# Patient Record
Sex: FEMALE | Race: WHITE | Employment: OTHER | URBAN - METROPOLITAN AREA
[De-identification: names, ages, dates, MRNs, and addresses within clinical notes are randomized per-mention and may not be internally consistent; named-entity substitution may affect disease eponyms.]

---

## 2017-01-16 RX ORDER — LISINOPRIL 5 MG/1
TABLET ORAL
Qty: 60 TABLET | Refills: 6 | Status: SHIPPED | OUTPATIENT
Start: 2017-01-16 | End: 2017-03-14 | Stop reason: SDUPTHER

## 2017-01-30 ENCOUNTER — OFFICE VISIT (OUTPATIENT)
Dept: INTERNAL MEDICINE CLINIC | Age: 67
End: 2017-01-30

## 2017-01-30 VITALS
RESPIRATION RATE: 16 BRPM | SYSTOLIC BLOOD PRESSURE: 110 MMHG | WEIGHT: 146 LBS | HEART RATE: 82 BPM | OXYGEN SATURATION: 97 % | HEIGHT: 62 IN | BODY MASS INDEX: 26.87 KG/M2 | DIASTOLIC BLOOD PRESSURE: 64 MMHG

## 2017-01-30 DIAGNOSIS — I10 ESSENTIAL HYPERTENSION: Chronic | ICD-10-CM

## 2017-01-30 DIAGNOSIS — G89.29 CHRONIC RIGHT SHOULDER PAIN: ICD-10-CM

## 2017-01-30 DIAGNOSIS — G25.81 RESTLESS LEGS SYNDROME (RLS): Chronic | ICD-10-CM

## 2017-01-30 DIAGNOSIS — M25.511 CHRONIC RIGHT SHOULDER PAIN: ICD-10-CM

## 2017-01-30 DIAGNOSIS — E78.2 MIXED HYPERLIPIDEMIA: Chronic | ICD-10-CM

## 2017-01-30 DIAGNOSIS — G89.4 CHRONIC PAIN SYNDROME: Primary | Chronic | ICD-10-CM

## 2017-01-30 DIAGNOSIS — M15.9 PRIMARY OSTEOARTHRITIS INVOLVING MULTIPLE JOINTS: Chronic | ICD-10-CM

## 2017-01-30 DIAGNOSIS — F41.9 ANXIETY: ICD-10-CM

## 2017-01-30 DIAGNOSIS — N39.46 MIXED INCONTINENCE: Chronic | ICD-10-CM

## 2017-01-30 PROCEDURE — 99214 OFFICE O/P EST MOD 30 MIN: CPT | Performed by: INTERNAL MEDICINE

## 2017-01-30 RX ORDER — HYDROCODONE BITARTRATE AND ACETAMINOPHEN 7.5; 325 MG/1; MG/1
TABLET ORAL
Qty: 90 TABLET | Refills: 0 | Status: SHIPPED | OUTPATIENT
Start: 2017-01-30 | End: 2017-03-30 | Stop reason: SDUPTHER

## 2017-02-13 RX ORDER — ATORVASTATIN CALCIUM 20 MG/1
TABLET, FILM COATED ORAL
Qty: 30 TABLET | Refills: 4 | Status: SHIPPED | OUTPATIENT
Start: 2017-02-13 | End: 2017-03-14 | Stop reason: SDUPTHER

## 2017-03-01 RX ORDER — HYDROCODONE BITARTRATE AND ACETAMINOPHEN 7.5; 325 MG/1; MG/1
TABLET ORAL
Qty: 90 TABLET | Refills: 0 | Status: SHIPPED | OUTPATIENT
Start: 2017-03-01 | End: 2017-03-08 | Stop reason: SDUPTHER

## 2017-03-08 ENCOUNTER — OFFICE VISIT (OUTPATIENT)
Dept: CARDIOLOGY CLINIC | Age: 67
End: 2017-03-08

## 2017-03-08 VITALS
DIASTOLIC BLOOD PRESSURE: 64 MMHG | SYSTOLIC BLOOD PRESSURE: 122 MMHG | HEIGHT: 62 IN | WEIGHT: 151 LBS | OXYGEN SATURATION: 98 % | BODY MASS INDEX: 27.79 KG/M2 | HEART RATE: 76 BPM

## 2017-03-08 DIAGNOSIS — I10 ESSENTIAL HYPERTENSION: Chronic | ICD-10-CM

## 2017-03-08 DIAGNOSIS — I25.10 CORONARY ARTERY DISEASE INVOLVING NATIVE CORONARY ARTERY OF NATIVE HEART WITHOUT ANGINA PECTORIS: ICD-10-CM

## 2017-03-08 DIAGNOSIS — E78.2 MIXED HYPERLIPIDEMIA: Chronic | ICD-10-CM

## 2017-03-08 DIAGNOSIS — I42.8 NONISCHEMIC CARDIOMYOPATHY (HCC): ICD-10-CM

## 2017-03-08 DIAGNOSIS — R06.02 SHORTNESS OF BREATH: Primary | ICD-10-CM

## 2017-03-08 PROCEDURE — 99214 OFFICE O/P EST MOD 30 MIN: CPT | Performed by: INTERNAL MEDICINE

## 2017-03-13 ENCOUNTER — OFFICE VISIT (OUTPATIENT)
Dept: ORTHOPEDIC SURGERY | Age: 67
End: 2017-03-13

## 2017-03-13 VITALS
BODY MASS INDEX: 27.79 KG/M2 | DIASTOLIC BLOOD PRESSURE: 66 MMHG | HEIGHT: 62 IN | RESPIRATION RATE: 16 BRPM | WEIGHT: 151 LBS | TEMPERATURE: 98.6 F | HEART RATE: 70 BPM | SYSTOLIC BLOOD PRESSURE: 127 MMHG

## 2017-03-13 DIAGNOSIS — G89.29 CHRONIC RIGHT SHOULDER PAIN: Primary | ICD-10-CM

## 2017-03-13 DIAGNOSIS — M77.8 TENDONITIS OF SHOULDER, LEFT: ICD-10-CM

## 2017-03-13 DIAGNOSIS — M25.511 CHRONIC RIGHT SHOULDER PAIN: Primary | ICD-10-CM

## 2017-03-13 DIAGNOSIS — M77.8 TENDONITIS OF SHOULDER, RIGHT: ICD-10-CM

## 2017-03-13 PROCEDURE — 99213 OFFICE O/P EST LOW 20 MIN: CPT | Performed by: PHYSICIAN ASSISTANT

## 2017-03-13 PROCEDURE — 20610 DRAIN/INJ JOINT/BURSA W/O US: CPT | Performed by: PHYSICIAN ASSISTANT

## 2017-03-13 PROCEDURE — 73030 X-RAY EXAM OF SHOULDER: CPT | Performed by: PHYSICIAN ASSISTANT

## 2017-03-13 RX ORDER — CARVEDILOL 6.25 MG/1
TABLET ORAL
Qty: 90 TABLET | Refills: 2 | Status: SHIPPED | OUTPATIENT
Start: 2017-03-13 | End: 2017-03-14 | Stop reason: SDUPTHER

## 2017-03-15 RX ORDER — CARVEDILOL 6.25 MG/1
TABLET ORAL
Qty: 270 TABLET | Refills: 3 | Status: SHIPPED | OUTPATIENT
Start: 2017-03-15 | End: 2018-03-08 | Stop reason: SDUPTHER

## 2017-03-15 RX ORDER — ATORVASTATIN CALCIUM 20 MG/1
TABLET, FILM COATED ORAL
Qty: 90 TABLET | Refills: 3 | Status: SHIPPED | OUTPATIENT
Start: 2017-03-15 | End: 2018-03-04 | Stop reason: SDUPTHER

## 2017-03-15 RX ORDER — LISINOPRIL 5 MG/1
TABLET ORAL
Qty: 180 TABLET | Refills: 3 | Status: SHIPPED | OUTPATIENT
Start: 2017-03-15 | End: 2018-03-04 | Stop reason: SDUPTHER

## 2017-03-15 RX ORDER — OXYBUTYNIN CHLORIDE 10 MG/1
TABLET, EXTENDED RELEASE ORAL
Qty: 90 TABLET | Refills: 3 | Status: SHIPPED | OUTPATIENT
Start: 2017-03-15 | End: 2018-03-04 | Stop reason: SDUPTHER

## 2017-03-31 RX ORDER — HYDROCODONE BITARTRATE AND ACETAMINOPHEN 7.5; 325 MG/1; MG/1
TABLET ORAL
Qty: 90 TABLET | Refills: 0 | Status: SHIPPED | OUTPATIENT
Start: 2017-03-31 | End: 2017-05-01 | Stop reason: SDUPTHER

## 2017-04-20 ENCOUNTER — TELEPHONE (OUTPATIENT)
Dept: ORTHOPEDIC SURGERY | Age: 67
End: 2017-04-20

## 2017-05-01 ENCOUNTER — OFFICE VISIT (OUTPATIENT)
Dept: INTERNAL MEDICINE CLINIC | Age: 67
End: 2017-05-01

## 2017-05-01 VITALS
DIASTOLIC BLOOD PRESSURE: 66 MMHG | HEIGHT: 62 IN | SYSTOLIC BLOOD PRESSURE: 110 MMHG | WEIGHT: 153 LBS | BODY MASS INDEX: 28.16 KG/M2 | RESPIRATION RATE: 16 BRPM

## 2017-05-01 DIAGNOSIS — N39.46 MIXED INCONTINENCE: Chronic | ICD-10-CM

## 2017-05-01 DIAGNOSIS — I10 ESSENTIAL HYPERTENSION: Chronic | ICD-10-CM

## 2017-05-01 DIAGNOSIS — I25.10 CORONARY ARTERY DISEASE INVOLVING NATIVE CORONARY ARTERY OF NATIVE HEART WITHOUT ANGINA PECTORIS: ICD-10-CM

## 2017-05-01 DIAGNOSIS — E78.2 MIXED HYPERLIPIDEMIA: Chronic | ICD-10-CM

## 2017-05-01 DIAGNOSIS — M15.9 PRIMARY OSTEOARTHRITIS INVOLVING MULTIPLE JOINTS: Chronic | ICD-10-CM

## 2017-05-01 DIAGNOSIS — G89.4 CHRONIC PAIN SYNDROME: Chronic | ICD-10-CM

## 2017-05-01 PROCEDURE — 99214 OFFICE O/P EST MOD 30 MIN: CPT | Performed by: INTERNAL MEDICINE

## 2017-05-01 RX ORDER — HYDROCODONE BITARTRATE AND ACETAMINOPHEN 7.5; 325 MG/1; MG/1
TABLET ORAL
Qty: 90 TABLET | Refills: 0 | Status: SHIPPED | OUTPATIENT
Start: 2017-05-01 | End: 2017-05-31 | Stop reason: SDUPTHER

## 2017-05-01 ASSESSMENT — PATIENT HEALTH QUESTIONNAIRE - PHQ9
SUM OF ALL RESPONSES TO PHQ QUESTIONS 1-9: 0
2. FEELING DOWN, DEPRESSED OR HOPELESS: 0
1. LITTLE INTEREST OR PLEASURE IN DOING THINGS: 0
SUM OF ALL RESPONSES TO PHQ9 QUESTIONS 1 & 2: 0

## 2017-05-31 RX ORDER — HYDROCODONE BITARTRATE AND ACETAMINOPHEN 7.5; 325 MG/1; MG/1
TABLET ORAL
Qty: 90 TABLET | Status: CANCELLED | OUTPATIENT
Start: 2017-05-31

## 2017-05-31 RX ORDER — HYDROCODONE BITARTRATE AND ACETAMINOPHEN 7.5; 325 MG/1; MG/1
TABLET ORAL
Qty: 90 TABLET | Refills: 0 | Status: SHIPPED | OUTPATIENT
Start: 2017-05-31 | End: 2017-06-29 | Stop reason: SDUPTHER

## 2017-06-09 ENCOUNTER — OFFICE VISIT (OUTPATIENT)
Dept: INTERNAL MEDICINE CLINIC | Age: 67
End: 2017-06-09

## 2017-06-09 ENCOUNTER — HOSPITAL ENCOUNTER (OUTPATIENT)
Dept: OTHER | Age: 67
Discharge: HOME | End: 2017-06-09
Attending: INTERNAL MEDICINE | Admitting: INTERNAL MEDICINE

## 2017-06-09 VITALS
HEIGHT: 62 IN | RESPIRATION RATE: 16 BRPM | BODY MASS INDEX: 28.16 KG/M2 | SYSTOLIC BLOOD PRESSURE: 116 MMHG | WEIGHT: 153 LBS | DIASTOLIC BLOOD PRESSURE: 72 MMHG

## 2017-06-09 DIAGNOSIS — R22.1 LUMP IN NECK: ICD-10-CM

## 2017-06-09 DIAGNOSIS — R22.1 LUMP IN NECK: Primary | ICD-10-CM

## 2017-06-09 DIAGNOSIS — R22.2 LUMP IN CHEST: ICD-10-CM

## 2017-06-09 PROCEDURE — 99212 OFFICE O/P EST SF 10 MIN: CPT | Performed by: INTERNAL MEDICINE

## 2017-06-29 ENCOUNTER — TELEPHONE (OUTPATIENT)
Dept: INTERNAL MEDICINE CLINIC | Age: 67
End: 2017-06-29

## 2017-06-30 RX ORDER — HYDROCODONE BITARTRATE AND ACETAMINOPHEN 7.5; 325 MG/1; MG/1
TABLET ORAL
Qty: 90 TABLET | Refills: 0 | Status: SHIPPED | OUTPATIENT
Start: 2017-06-30 | End: 2017-07-28 | Stop reason: SDUPTHER

## 2017-07-28 ENCOUNTER — OFFICE VISIT (OUTPATIENT)
Dept: INTERNAL MEDICINE CLINIC | Age: 67
End: 2017-07-28

## 2017-07-28 VITALS
BODY MASS INDEX: 29.08 KG/M2 | RESPIRATION RATE: 16 BRPM | DIASTOLIC BLOOD PRESSURE: 74 MMHG | HEIGHT: 62 IN | SYSTOLIC BLOOD PRESSURE: 110 MMHG | WEIGHT: 158 LBS

## 2017-07-28 DIAGNOSIS — G89.4 CHRONIC PAIN SYNDROME: Chronic | ICD-10-CM

## 2017-07-28 DIAGNOSIS — E78.2 MIXED HYPERLIPIDEMIA: Chronic | ICD-10-CM

## 2017-07-28 DIAGNOSIS — I10 ESSENTIAL HYPERTENSION: Chronic | ICD-10-CM

## 2017-07-28 DIAGNOSIS — I25.10 CORONARY ARTERY DISEASE INVOLVING NATIVE CORONARY ARTERY OF NATIVE HEART WITHOUT ANGINA PECTORIS: ICD-10-CM

## 2017-07-28 DIAGNOSIS — F41.9 ANXIETY: ICD-10-CM

## 2017-07-28 PROCEDURE — 99214 OFFICE O/P EST MOD 30 MIN: CPT | Performed by: INTERNAL MEDICINE

## 2017-07-28 RX ORDER — HYDROCODONE BITARTRATE AND ACETAMINOPHEN 7.5; 325 MG/1; MG/1
TABLET ORAL
Qty: 90 TABLET | Refills: 0 | Status: SHIPPED | OUTPATIENT
Start: 2017-07-28 | End: 2017-08-28 | Stop reason: SDUPTHER

## 2017-08-28 ENCOUNTER — TELEPHONE (OUTPATIENT)
Dept: INTERNAL MEDICINE CLINIC | Age: 67
End: 2017-08-28

## 2017-08-28 RX ORDER — HYDROCODONE BITARTRATE AND ACETAMINOPHEN 7.5; 325 MG/1; MG/1
TABLET ORAL
Qty: 90 TABLET | Refills: 0 | Status: SHIPPED | OUTPATIENT
Start: 2017-08-28 | End: 2017-09-27 | Stop reason: SDUPTHER

## 2017-09-11 ENCOUNTER — TELEPHONE (OUTPATIENT)
Dept: INTERNAL MEDICINE CLINIC | Age: 67
End: 2017-09-11

## 2017-09-11 DIAGNOSIS — I25.10 CORONARY ARTERY DISEASE INVOLVING NATIVE CORONARY ARTERY OF NATIVE HEART WITHOUT ANGINA PECTORIS: ICD-10-CM

## 2017-09-11 DIAGNOSIS — E78.2 MIXED HYPERLIPIDEMIA: Primary | Chronic | ICD-10-CM

## 2017-09-11 DIAGNOSIS — I10 ESSENTIAL HYPERTENSION: Chronic | ICD-10-CM

## 2017-09-21 DIAGNOSIS — R06.02 SHORTNESS OF BREATH: ICD-10-CM

## 2017-09-21 DIAGNOSIS — I25.10 CORONARY ARTERY DISEASE INVOLVING NATIVE CORONARY ARTERY OF NATIVE HEART WITHOUT ANGINA PECTORIS: ICD-10-CM

## 2017-09-21 DIAGNOSIS — I10 ESSENTIAL HYPERTENSION: Chronic | ICD-10-CM

## 2017-09-21 DIAGNOSIS — E78.2 MIXED HYPERLIPIDEMIA: Chronic | ICD-10-CM

## 2017-09-21 LAB
BASOPHILS ABSOLUTE: 0.1 K/UL (ref 0–0.2)
BASOPHILS RELATIVE PERCENT: 0.8 %
EOSINOPHILS ABSOLUTE: 0.2 K/UL (ref 0–0.6)
EOSINOPHILS RELATIVE PERCENT: 2.1 %
HCT VFR BLD CALC: 42.4 % (ref 36–48)
HEMOGLOBIN: 14.6 G/DL (ref 12–16)
LYMPHOCYTES ABSOLUTE: 2.8 K/UL (ref 1–5.1)
LYMPHOCYTES RELATIVE PERCENT: 35 %
MCH RBC QN AUTO: 30.9 PG (ref 26–34)
MCHC RBC AUTO-ENTMCNC: 34.4 G/DL (ref 31–36)
MCV RBC AUTO: 89.9 FL (ref 80–100)
MONOCYTES ABSOLUTE: 0.5 K/UL (ref 0–1.3)
MONOCYTES RELATIVE PERCENT: 6.2 %
NEUTROPHILS ABSOLUTE: 4.5 K/UL (ref 1.7–7.7)
NEUTROPHILS RELATIVE PERCENT: 55.9 %
PDW BLD-RTO: 12.6 % (ref 12.4–15.4)
PLATELET # BLD: 262 K/UL (ref 135–450)
PMV BLD AUTO: 7.4 FL (ref 5–10.5)
RBC # BLD: 4.72 M/UL (ref 4–5.2)
WBC # BLD: 8 K/UL (ref 4–11)

## 2017-09-22 ENCOUNTER — OFFICE VISIT (OUTPATIENT)
Dept: CARDIOLOGY CLINIC | Age: 67
End: 2017-09-22

## 2017-09-22 VITALS
OXYGEN SATURATION: 97 % | DIASTOLIC BLOOD PRESSURE: 60 MMHG | HEART RATE: 74 BPM | WEIGHT: 151 LBS | SYSTOLIC BLOOD PRESSURE: 110 MMHG | HEIGHT: 62 IN | BODY MASS INDEX: 27.79 KG/M2

## 2017-09-22 DIAGNOSIS — I42.8 NICM (NONISCHEMIC CARDIOMYOPATHY) (HCC): Primary | ICD-10-CM

## 2017-09-22 DIAGNOSIS — E78.2 MIXED HYPERLIPIDEMIA: Chronic | ICD-10-CM

## 2017-09-22 DIAGNOSIS — I10 ESSENTIAL HYPERTENSION: Chronic | ICD-10-CM

## 2017-09-22 DIAGNOSIS — I25.10 CORONARY ARTERY DISEASE INVOLVING NATIVE CORONARY ARTERY OF NATIVE HEART WITHOUT ANGINA PECTORIS: ICD-10-CM

## 2017-09-22 DIAGNOSIS — R06.02 SOB (SHORTNESS OF BREATH): ICD-10-CM

## 2017-09-22 LAB
A/G RATIO: 1.6 (ref 1.1–2.2)
ALBUMIN SERPL-MCNC: 4.2 G/DL (ref 3.4–5)
ALP BLD-CCNC: 126 U/L (ref 40–129)
ALT SERPL-CCNC: 12 U/L (ref 10–40)
ANION GAP SERPL CALCULATED.3IONS-SCNC: 12 MMOL/L (ref 3–16)
ANION GAP SERPL CALCULATED.3IONS-SCNC: 13 MMOL/L (ref 3–16)
AST SERPL-CCNC: 18 U/L (ref 15–37)
BILIRUB SERPL-MCNC: 0.5 MG/DL (ref 0–1)
BUN BLDV-MCNC: 11 MG/DL (ref 7–20)
BUN BLDV-MCNC: 12 MG/DL (ref 7–20)
CALCIUM SERPL-MCNC: 9.3 MG/DL (ref 8.3–10.6)
CALCIUM SERPL-MCNC: 9.4 MG/DL (ref 8.3–10.6)
CHLORIDE BLD-SCNC: 100 MMOL/L (ref 99–110)
CHLORIDE BLD-SCNC: 99 MMOL/L (ref 99–110)
CHOLESTEROL, TOTAL: 178 MG/DL (ref 0–199)
CO2: 28 MMOL/L (ref 21–32)
CO2: 28 MMOL/L (ref 21–32)
CREAT SERPL-MCNC: 0.7 MG/DL (ref 0.6–1.2)
CREAT SERPL-MCNC: 0.7 MG/DL (ref 0.6–1.2)
GFR AFRICAN AMERICAN: >60
GFR AFRICAN AMERICAN: >60
GFR NON-AFRICAN AMERICAN: >60
GFR NON-AFRICAN AMERICAN: >60
GLOBULIN: 2.7 G/DL
GLUCOSE BLD-MCNC: 105 MG/DL (ref 70–99)
GLUCOSE BLD-MCNC: 106 MG/DL (ref 70–99)
HDLC SERPL-MCNC: 58 MG/DL (ref 40–60)
LDL CHOLESTEROL CALCULATED: 97 MG/DL
POTASSIUM SERPL-SCNC: 4 MMOL/L (ref 3.5–5.1)
POTASSIUM SERPL-SCNC: 4.1 MMOL/L (ref 3.5–5.1)
PRO-BNP: 105 PG/ML (ref 0–124)
SODIUM BLD-SCNC: 140 MMOL/L (ref 136–145)
SODIUM BLD-SCNC: 140 MMOL/L (ref 136–145)
TOTAL PROTEIN: 6.9 G/DL (ref 6.4–8.2)
TRIGL SERPL-MCNC: 115 MG/DL (ref 0–150)
TSH SERPL DL<=0.05 MIU/L-ACNC: 1.52 UIU/ML (ref 0.27–4.2)
VLDLC SERPL CALC-MCNC: 23 MG/DL

## 2017-09-22 PROCEDURE — 99215 OFFICE O/P EST HI 40 MIN: CPT | Performed by: INTERNAL MEDICINE

## 2017-09-27 ENCOUNTER — TELEPHONE (OUTPATIENT)
Dept: INTERNAL MEDICINE CLINIC | Age: 67
End: 2017-09-27

## 2017-09-28 RX ORDER — HYDROCODONE BITARTRATE AND ACETAMINOPHEN 7.5; 325 MG/1; MG/1
TABLET ORAL
Qty: 90 TABLET | Refills: 0 | Status: SHIPPED | OUTPATIENT
Start: 2017-09-28 | End: 2017-11-01 | Stop reason: SDUPTHER

## 2017-10-30 ENCOUNTER — TELEPHONE (OUTPATIENT)
Dept: INTERNAL MEDICINE CLINIC | Age: 67
End: 2017-10-30

## 2017-10-30 NOTE — TELEPHONE ENCOUNTER
Patient was called regarding missing her appointments/cancelling after time of appointments. Patient states she wants a recommendation for a PCP closer to where she lives in [place].  Please reach patient on number listed

## 2017-11-01 ENCOUNTER — OFFICE VISIT (OUTPATIENT)
Dept: INTERNAL MEDICINE CLINIC | Age: 67
End: 2017-11-01

## 2017-11-01 VITALS
BODY MASS INDEX: 27.6 KG/M2 | RESPIRATION RATE: 16 BRPM | WEIGHT: 150 LBS | DIASTOLIC BLOOD PRESSURE: 74 MMHG | SYSTOLIC BLOOD PRESSURE: 108 MMHG | HEIGHT: 62 IN

## 2017-11-01 DIAGNOSIS — G25.81 RESTLESS LEGS SYNDROME (RLS): Chronic | ICD-10-CM

## 2017-11-01 DIAGNOSIS — G89.4 CHRONIC PAIN SYNDROME: Chronic | ICD-10-CM

## 2017-11-01 DIAGNOSIS — I10 ESSENTIAL HYPERTENSION: Chronic | ICD-10-CM

## 2017-11-01 DIAGNOSIS — E78.2 MIXED HYPERLIPIDEMIA: Chronic | ICD-10-CM

## 2017-11-01 PROCEDURE — 90670 PCV13 VACCINE IM: CPT | Performed by: INTERNAL MEDICINE

## 2017-11-01 PROCEDURE — G0009 ADMIN PNEUMOCOCCAL VACCINE: HCPCS | Performed by: INTERNAL MEDICINE

## 2017-11-01 PROCEDURE — 99214 OFFICE O/P EST MOD 30 MIN: CPT | Performed by: INTERNAL MEDICINE

## 2017-11-01 RX ORDER — AZITHROMYCIN 250 MG/1
TABLET, FILM COATED ORAL
Qty: 1 PACKET | Refills: 0 | Status: SHIPPED | OUTPATIENT
Start: 2017-11-01 | End: 2017-11-11

## 2017-11-01 RX ORDER — HYDROCODONE BITARTRATE AND ACETAMINOPHEN 7.5; 325 MG/1; MG/1
TABLET ORAL
Qty: 90 TABLET | Refills: 0 | Status: SHIPPED | OUTPATIENT
Start: 2017-11-01 | End: 2017-11-30 | Stop reason: SDUPTHER

## 2017-11-01 ASSESSMENT — PATIENT HEALTH QUESTIONNAIRE - PHQ9
1. LITTLE INTEREST OR PLEASURE IN DOING THINGS: 0
SUM OF ALL RESPONSES TO PHQ QUESTIONS 1-9: 0
SUM OF ALL RESPONSES TO PHQ9 QUESTIONS 1 & 2: 0
2. FEELING DOWN, DEPRESSED OR HOPELESS: 0

## 2017-11-01 NOTE — ASSESSMENT & PLAN NOTE
This problem is stable, reviewed in detail, and advised patient to continue the current instructions or medications. Will continue to closely monitor the situation.

## 2017-11-01 NOTE — PROGRESS NOTES
Subjective:      Patient ID: Macy Bull is a 74 y.o. female. HPI  Established patient here for a visit to manage chronic medical conditions as detailed below. Chronic pain syndrome  I counseled the patient for 10 minutes, on the importance of avoiding and trying to wean slowly off the pain medications. I explained to the patient the downside of continuing on the pain medications/benzodiazepines and their addictive and physical dependance properties. Patient expresses understanding. Controlled Substances Monitoring:     Attestation: The Prescription Monitoring Report for this patient was reviewed today. Randall Marion MD)  Documentation: Possible medication side effects, risk of tolerance and/or dependence, and alternative treatments discussed., No signs of potential drug abuse or diversion identified., Existing medication contract. Randall Marion MD)  Chronic Pain: Treatment objectives documented - patient is progressing appropriately. , Dose reduction has been attempted., Reviewed the patient's functional status and documentation, including the 4A's of chronic pain treatment. Randall Marion MD)     Essential hypertension  This is a chronic problem. The problem is well controlled. Patient monitors readings regularly. Pertinent negatives include no chest pain, focal sensory loss, focal weakness, leg pain, myalgias or shortness of breath. No headaches or chest pain. Takes medications regularly. Blood pressure has been stable, blood work was reviewed, and advised patient to continue the current instructions or medications. Mixed hyperlipidemia  This has been a chronic problem, takes meds regularly. Monitors diet and tries to follow a low fat diet. Not been very compliant w exercise. Lipids have been stable, The problem is controlled.  Recent lipid tests were reviewed and are normal. Pertinent negatives include no chest pain, focal sensory loss, focal weakness, leg pain, myalgias or Extremities are normal. Peripheral pulses are normal. Screening neurological exam is normal without focal findings. Cranial nerves are intact, reflexes are symmetrical and muscle strength eaqual. Skin is normal without suspicious lesions noted. Assessment:      Chronic pain syndrome  I counseled the patient for 10 minutes, on the importance of avoiding and trying to wean slowly off the pain medications. I explained to the patient the downside of continuing on the pain medications/benzodiazepines and their addictive and physical dependance properties. Patient expresses understanding. Controlled Substances Monitoring:     Attestation: The Prescription Monitoring Report for this patient was reviewed today. Nadeen Zhao MD)  Documentation: Possible medication side effects, risk of tolerance and/or dependence, and alternative treatments discussed., No signs of potential drug abuse or diversion identified., Existing medication contract. Nadeen Zhao MD)  Chronic Pain: Treatment objectives documented - patient is progressing appropriately. , Dose reduction has been attempted., Reviewed the patient's functional status and documentation, including the 4A's of chronic pain treatment. Nadeen Zhao MD)     Essential hypertension  This is a chronic problem. The problem is well controlled. Patient monitors readings regularly. Pertinent negatives include no chest pain, focal sensory loss, focal weakness, leg pain, myalgias or shortness of breath. No headaches or chest pain. Takes medications regularly. Blood pressure has been stable, blood work was reviewed, and advised patient to continue the current instructions or medications. Mixed hyperlipidemia  This has been a chronic problem, takes meds regularly. Monitors diet and tries to follow a low fat diet. Not been very compliant w exercise. Lipids have been stable, The problem is controlled.  Recent lipid tests were reviewed and are normal. Pertinent

## 2017-11-21 ENCOUNTER — TELEPHONE (OUTPATIENT)
Dept: INTERNAL MEDICINE CLINIC | Age: 67
End: 2017-11-21

## 2017-11-21 NOTE — TELEPHONE ENCOUNTER
Pt wants to know if Dr. Isaiah Durán would give her a script for Diclofenac Sodium EC 50 mg tabs for arthritis pain. She took it a couple of yrs ago but Dr. Simeon Rey took her off of it because of heart failure. Pt said Dr. Simeon Rey said she can take it every once  In a while. Call pt and let her know.      [facility]

## 2017-11-22 RX ORDER — DICLOFENAC SODIUM 75 MG/1
75 TABLET, DELAYED RELEASE ORAL 2 TIMES DAILY
Qty: 30 TABLET | Refills: 0 | Status: SHIPPED | OUTPATIENT
Start: 2017-11-22 | End: 2018-03-04 | Stop reason: SDUPTHER

## 2017-11-30 ENCOUNTER — TELEPHONE (OUTPATIENT)
Dept: INTERNAL MEDICINE CLINIC | Age: 67
End: 2017-11-30

## 2017-11-30 RX ORDER — HYDROCODONE BITARTRATE AND ACETAMINOPHEN 7.5; 325 MG/1; MG/1
TABLET ORAL
Qty: 90 TABLET | Refills: 0 | Status: SHIPPED | OUTPATIENT
Start: 2017-11-30 | End: 2017-12-29 | Stop reason: SDUPTHER

## 2017-12-28 ENCOUNTER — TELEPHONE (OUTPATIENT)
Dept: INTERNAL MEDICINE CLINIC | Age: 67
End: 2017-12-28

## 2017-12-28 RX ORDER — HYDROCODONE BITARTRATE AND ACETAMINOPHEN 7.5; 325 MG/1; MG/1
TABLET ORAL
Qty: 90 TABLET | Status: CANCELLED | OUTPATIENT
Start: 2017-12-28

## 2017-12-29 DIAGNOSIS — M15.9 PRIMARY OSTEOARTHRITIS INVOLVING MULTIPLE JOINTS: Primary | ICD-10-CM

## 2017-12-29 RX ORDER — HYDROCODONE BITARTRATE AND ACETAMINOPHEN 7.5; 325 MG/1; MG/1
TABLET ORAL
Qty: 90 TABLET | Refills: 0 | Status: SHIPPED | OUTPATIENT
Start: 2017-12-29 | End: 2018-01-30 | Stop reason: SDUPTHER

## 2018-01-26 ENCOUNTER — TELEPHONE (OUTPATIENT)
Dept: INTERNAL MEDICINE CLINIC | Age: 68
End: 2018-01-26

## 2018-01-26 DIAGNOSIS — M15.9 PRIMARY OSTEOARTHRITIS INVOLVING MULTIPLE JOINTS: ICD-10-CM

## 2018-01-29 RX ORDER — HYDROCODONE BITARTRATE AND ACETAMINOPHEN 7.5; 325 MG/1; MG/1
TABLET ORAL
Qty: 90 TABLET | OUTPATIENT
Start: 2018-01-29 | End: 2018-02-26

## 2018-01-30 ENCOUNTER — OFFICE VISIT (OUTPATIENT)
Dept: INTERNAL MEDICINE CLINIC | Age: 68
End: 2018-01-30

## 2018-01-30 VITALS
HEIGHT: 62 IN | RESPIRATION RATE: 16 BRPM | BODY MASS INDEX: 27.6 KG/M2 | DIASTOLIC BLOOD PRESSURE: 76 MMHG | SYSTOLIC BLOOD PRESSURE: 120 MMHG | WEIGHT: 150 LBS

## 2018-01-30 DIAGNOSIS — I25.10 CORONARY ARTERY DISEASE INVOLVING NATIVE CORONARY ARTERY OF NATIVE HEART WITHOUT ANGINA PECTORIS: ICD-10-CM

## 2018-01-30 DIAGNOSIS — E78.2 MIXED HYPERLIPIDEMIA: Chronic | ICD-10-CM

## 2018-01-30 DIAGNOSIS — Z12.39 SCREENING FOR BREAST CANCER: ICD-10-CM

## 2018-01-30 DIAGNOSIS — G89.4 CHRONIC PAIN SYNDROME: Primary | Chronic | ICD-10-CM

## 2018-01-30 DIAGNOSIS — M15.9 PRIMARY OSTEOARTHRITIS INVOLVING MULTIPLE JOINTS: Chronic | ICD-10-CM

## 2018-01-30 DIAGNOSIS — I10 ESSENTIAL HYPERTENSION: Chronic | ICD-10-CM

## 2018-01-30 PROCEDURE — 99214 OFFICE O/P EST MOD 30 MIN: CPT | Performed by: INTERNAL MEDICINE

## 2018-01-30 RX ORDER — HYDROCODONE BITARTRATE AND ACETAMINOPHEN 7.5; 325 MG/1; MG/1
TABLET ORAL
Qty: 90 TABLET | Refills: 0 | Status: SHIPPED | OUTPATIENT
Start: 2018-01-30 | End: 2018-02-28 | Stop reason: SDUPTHER

## 2018-01-30 NOTE — PROGRESS NOTES
Subjective:      Patient ID: Jen Harris is a 74 y.o. female. HPI  Established patient here for a visit to manage chronic medical conditions as detailed below. Chronic pain syndrome  I counseled the patient for 10 minutes, on the importance of avoiding and trying to wean slowly off the pain medications. I explained to the patient the downside of continuing on the pain medications/benzodiazepines and their addictive and physical dependance properties. Patient expresses understanding. Controlled Substances Monitoring:     Attestation: The Prescription Monitoring Report for this patient was reviewed today. Inge Chauhan MD)  Documentation: Possible medication side effects, risk of tolerance and/or dependence, and alternative treatments discussed., No signs of potential drug abuse or diversion identified. Inge Chauhan MD)  Acute Pain Prescriptions: Severe pain not adequately treated with lower dose. Inge Chauhan MD)  Chronic Pain: Dose reduction has been attempted., Reviewed the patient's functional status and documentation, including the 4A's of chronic pain treatment. , Reestablished informed consent, including providing the patient with written information on the potential adverse effects of long-term opioid therapy. , Functional status reviewed - continues with improved or maintaining ADL's., Treatment objectives documented - patient is progressing appropriately. Inge Chauhan MD)  Medication Contracts: Existing medication contract. Inge Chauhan MD)     Essential hypertension  This is a chronic problem. The problem is well controlled. Patient monitors readings regularly. Pertinent negatives include no chest pain, focal sensory loss, focal weakness, leg pain, myalgias or shortness of breath. No headaches or chest pain. Takes medications regularly. Blood pressure has been stable, blood work was reviewed, and advised patient to continue the current instructions or medications. Mixed hyperlipidemia  This has been a chronic problem, takes meds regularly. Monitors diet and tries to follow a low fat diet. Not been very compliant w exercise. Lipids have been stable, The problem is controlled. Recent lipid tests were reviewed and are normal. Pertinent negatives include no chest pain, focal sensory loss, focal weakness, leg pain, myalgias or shortness of breath. Advised patient to continue the current instructions or medications. Coronary artery disease involving native coronary artery of native heart without angina pectoris  Has been stable, taking medications regularly, no new side effects. Under good control. Primary osteoarthritis involving multiple joints  This problem is stable, reviewed in detail, and advised patient to continue the current instructions or medications. Will continue to closely monitor the situation. Review of Systems  ROS: No unusual headaches or allergy symptoms or blurred vision. No prolonged cough. No flushing or facial pain or chest pain,dizziness, dyspnea, palpitations, or chest pain on exertion. No syncope. No nausea or vommitting or diarrhea. No jaundice or abdominal pain, change in bowel habits, black or bloody stools. No dysuria or hematuria or frequency of urination. No myalgias or muscle pain. No numbness, weakness, or tingling. No falls, or loss of consciousness. No weight loss or back pain. No falls. No paresthesias. No joint swelling or redness. No joint pain. No recent weight loss. No focal weakness or sensory deficits or paresthesias, No confusion or altered sensorium. No hematemesis. No hearing loss. No siezures. All other systems were reviewed, and review was negative.    Objective:   Physical Exam  /76 (Site: Left Arm, Position: Sitting, Cuff Size: Medium Adult)   Resp 16   Ht 5' 2\" (1.575 m)   Wt 150 lb (68 kg)   BMI 27.44 kg/m²    The physical exam reveals a patient who appears well, alert and oriented x 3, pleasant, cooperative. Vitals are as noted. Head is atraumatic and normocephalic. Eyes reveal normal conjunctiva, cornea normal, pupils are equal and rective to light. Nasal mucosa is normal. Throat is normal without exudates. Ears reveal normal tympanic membranes. Neck is supple and free of adenopathy, or masses. No thyromegaly. No jugular venous distension. Lungs are clear to auscultation, no rales or rhonchi noted. Heart sounds are regular , no murmurs, clicks, gallops or rubs. Abdomen is soft, no tenderness, masses or organomegaly. Bowel sounds are normally heard. Pelvis: normal. Extremities are normal. Peripheral pulses are normal. Screening neurological exam is normal without focal findings. Cranial nerves are intact, reflexes are symmetrical and muscle strength eaqual. Skin is normal without suspicious lesions noted. Assessment:      Chronic pain syndrome  I counseled the patient for 10 minutes, on the importance of avoiding and trying to wean slowly off the pain medications. I explained to the patient the downside of continuing on the pain medications/benzodiazepines and their addictive and physical dependance properties. Patient expresses understanding. Controlled Substances Monitoring:     Attestation: The Prescription Monitoring Report for this patient was reviewed today. Gideon Crowell MD)  Documentation: Possible medication side effects, risk of tolerance and/or dependence, and alternative treatments discussed., No signs of potential drug abuse or diversion identified. Gideon Crowell MD)  Acute Pain Prescriptions: Severe pain not adequately treated with lower dose. Gideon Crowell MD)  Chronic Pain: Dose reduction has been attempted., Reviewed the patient's functional status and documentation, including the 4A's of chronic pain treatment. , Reestablished informed consent, including providing the patient with written information on the potential adverse effects of long-term opioid

## 2018-02-28 ENCOUNTER — TELEPHONE (OUTPATIENT)
Dept: INTERNAL MEDICINE CLINIC | Age: 68
End: 2018-02-28

## 2018-02-28 DIAGNOSIS — G89.4 CHRONIC PAIN SYNDROME: Primary | Chronic | ICD-10-CM

## 2018-02-28 DIAGNOSIS — M15.9 PRIMARY OSTEOARTHRITIS INVOLVING MULTIPLE JOINTS: Chronic | ICD-10-CM

## 2018-03-01 RX ORDER — HYDROCODONE BITARTRATE AND ACETAMINOPHEN 7.5; 325 MG/1; MG/1
TABLET ORAL
Qty: 90 TABLET | Refills: 0 | Status: SHIPPED | OUTPATIENT
Start: 2018-03-01 | End: 2018-04-03 | Stop reason: SDUPTHER

## 2018-03-05 RX ORDER — DICLOFENAC SODIUM 75 MG/1
TABLET, DELAYED RELEASE ORAL
Qty: 30 TABLET | Refills: 3 | Status: SHIPPED | OUTPATIENT
Start: 2018-03-05 | End: 2018-10-10 | Stop reason: SDUPTHER

## 2018-03-05 RX ORDER — ATORVASTATIN CALCIUM 20 MG/1
TABLET, FILM COATED ORAL
Qty: 90 TABLET | Refills: 3 | Status: SHIPPED | OUTPATIENT
Start: 2018-03-05 | End: 2019-03-03 | Stop reason: SDUPTHER

## 2018-03-05 RX ORDER — OXYBUTYNIN CHLORIDE 10 MG/1
TABLET, EXTENDED RELEASE ORAL
Qty: 90 TABLET | Refills: 3 | Status: SHIPPED | OUTPATIENT
Start: 2018-03-05 | End: 2019-03-03 | Stop reason: SDUPTHER

## 2018-03-06 RX ORDER — LISINOPRIL 5 MG/1
TABLET ORAL
Qty: 180 TABLET | Refills: 0 | Status: SHIPPED | OUTPATIENT
Start: 2018-03-06 | End: 2019-03-04 | Stop reason: SDUPTHER

## 2018-03-09 RX ORDER — LISINOPRIL 5 MG/1
TABLET ORAL
Qty: 180 TABLET | Refills: 2 | Status: SHIPPED | OUTPATIENT
Start: 2018-03-09 | End: 2018-09-04 | Stop reason: SDUPTHER

## 2018-03-09 RX ORDER — CARVEDILOL 6.25 MG/1
TABLET ORAL
Qty: 270 TABLET | Refills: 2 | Status: SHIPPED | OUTPATIENT
Start: 2018-03-09 | End: 2018-12-03 | Stop reason: SDUPTHER

## 2018-04-03 ENCOUNTER — TELEPHONE (OUTPATIENT)
Dept: INTERNAL MEDICINE CLINIC | Age: 68
End: 2018-04-03

## 2018-04-03 DIAGNOSIS — G89.4 CHRONIC PAIN SYNDROME: Chronic | ICD-10-CM

## 2018-04-03 DIAGNOSIS — M15.9 PRIMARY OSTEOARTHRITIS INVOLVING MULTIPLE JOINTS: Chronic | ICD-10-CM

## 2018-04-03 RX ORDER — HYDROCODONE BITARTRATE AND ACETAMINOPHEN 7.5; 325 MG/1; MG/1
TABLET ORAL
Qty: 90 TABLET | Refills: 0 | Status: SHIPPED | OUTPATIENT
Start: 2018-04-03 | End: 2018-05-01 | Stop reason: SDUPTHER

## 2018-04-03 RX ORDER — HYDROCODONE BITARTRATE AND ACETAMINOPHEN 5; 325 MG/1; MG/1
1 TABLET ORAL EVERY 6 HOURS PRN
Status: CANCELLED | OUTPATIENT
Start: 2018-04-03 | End: 2018-04-10

## 2018-05-01 ENCOUNTER — OFFICE VISIT (OUTPATIENT)
Dept: INTERNAL MEDICINE CLINIC | Age: 68
End: 2018-05-01

## 2018-05-01 VITALS
WEIGHT: 146 LBS | BODY MASS INDEX: 26.7 KG/M2 | OXYGEN SATURATION: 97 % | DIASTOLIC BLOOD PRESSURE: 66 MMHG | SYSTOLIC BLOOD PRESSURE: 108 MMHG | HEART RATE: 88 BPM

## 2018-05-01 DIAGNOSIS — I42.9 CARDIOMYOPATHY, UNSPECIFIED TYPE (HCC): ICD-10-CM

## 2018-05-01 DIAGNOSIS — I25.10 CORONARY ARTERY DISEASE INVOLVING NATIVE CORONARY ARTERY OF NATIVE HEART WITHOUT ANGINA PECTORIS: ICD-10-CM

## 2018-05-01 DIAGNOSIS — I10 ESSENTIAL HYPERTENSION: Chronic | ICD-10-CM

## 2018-05-01 DIAGNOSIS — E78.2 MIXED HYPERLIPIDEMIA: Chronic | ICD-10-CM

## 2018-05-01 DIAGNOSIS — M15.9 PRIMARY OSTEOARTHRITIS INVOLVING MULTIPLE JOINTS: Chronic | ICD-10-CM

## 2018-05-01 DIAGNOSIS — G89.4 CHRONIC PAIN SYNDROME: Chronic | ICD-10-CM

## 2018-05-01 PROCEDURE — 99214 OFFICE O/P EST MOD 30 MIN: CPT | Performed by: INTERNAL MEDICINE

## 2018-05-01 RX ORDER — HYDROCODONE BITARTRATE AND ACETAMINOPHEN 7.5; 325 MG/1; MG/1
TABLET ORAL
Qty: 90 TABLET | Refills: 0 | Status: SHIPPED | OUTPATIENT
Start: 2018-05-01 | End: 2018-06-01 | Stop reason: SDUPTHER

## 2018-05-31 ENCOUNTER — TELEPHONE (OUTPATIENT)
Dept: INTERNAL MEDICINE CLINIC | Age: 68
End: 2018-05-31

## 2018-05-31 DIAGNOSIS — G89.4 CHRONIC PAIN SYNDROME: Chronic | ICD-10-CM

## 2018-05-31 DIAGNOSIS — M15.9 PRIMARY OSTEOARTHRITIS INVOLVING MULTIPLE JOINTS: Chronic | ICD-10-CM

## 2018-06-01 ENCOUNTER — HOSPITAL ENCOUNTER (OUTPATIENT)
Dept: NON INVASIVE DIAGNOSTICS | Age: 68
Discharge: HOME | End: 2018-06-01
Attending: INTERNAL MEDICINE | Admitting: INTERNAL MEDICINE

## 2018-06-01 DIAGNOSIS — I42.8 OTHER CARDIOMYOPATHIES (CODE): ICD-10-CM

## 2018-06-01 DIAGNOSIS — I42.8 NICM (NONISCHEMIC CARDIOMYOPATHY) (HCC): ICD-10-CM

## 2018-06-01 LAB
LV EF: 53 %
LVEF MODALITY: NORMAL

## 2018-06-01 RX ORDER — HYDROCODONE BITARTRATE AND ACETAMINOPHEN 7.5; 325 MG/1; MG/1
TABLET ORAL
Qty: 90 TABLET | Refills: 0 | Status: SHIPPED | OUTPATIENT
Start: 2018-06-01 | End: 2018-07-02 | Stop reason: SDUPTHER

## 2018-06-08 ENCOUNTER — TELEPHONE (OUTPATIENT)
Dept: INTERNAL MEDICINE CLINIC | Age: 68
End: 2018-06-08

## 2018-06-08 ENCOUNTER — TELEPHONE (OUTPATIENT)
Dept: CARDIOLOGY CLINIC | Age: 68
End: 2018-06-08

## 2018-06-30 DIAGNOSIS — M15.9 PRIMARY OSTEOARTHRITIS INVOLVING MULTIPLE JOINTS: Chronic | ICD-10-CM

## 2018-06-30 DIAGNOSIS — G89.4 CHRONIC PAIN SYNDROME: Chronic | ICD-10-CM

## 2018-06-30 RX ORDER — HYDROCODONE BITARTRATE AND ACETAMINOPHEN 7.5; 325 MG/1; MG/1
TABLET ORAL
Qty: 90 TABLET | Refills: 0 | Status: CANCELLED | OUTPATIENT
Start: 2018-06-30 | End: 2018-07-30

## 2018-07-02 ENCOUNTER — TELEPHONE (OUTPATIENT)
Dept: INTERNAL MEDICINE CLINIC | Age: 68
End: 2018-07-02

## 2018-07-02 DIAGNOSIS — G89.4 CHRONIC PAIN SYNDROME: Chronic | ICD-10-CM

## 2018-07-02 DIAGNOSIS — M15.9 PRIMARY OSTEOARTHRITIS INVOLVING MULTIPLE JOINTS: Chronic | ICD-10-CM

## 2018-07-03 RX ORDER — HYDROCODONE BITARTRATE AND ACETAMINOPHEN 7.5; 325 MG/1; MG/1
TABLET ORAL
Qty: 90 TABLET | Refills: 0 | Status: SHIPPED | OUTPATIENT
Start: 2018-07-03 | End: 2018-08-02 | Stop reason: SDUPTHER

## 2018-08-02 ENCOUNTER — TELEPHONE (OUTPATIENT)
Dept: INTERNAL MEDICINE CLINIC | Age: 68
End: 2018-08-02

## 2018-08-02 DIAGNOSIS — M15.9 PRIMARY OSTEOARTHRITIS INVOLVING MULTIPLE JOINTS: Chronic | ICD-10-CM

## 2018-08-02 DIAGNOSIS — G89.4 CHRONIC PAIN SYNDROME: Chronic | ICD-10-CM

## 2018-08-02 RX ORDER — HYDROCODONE BITARTRATE AND ACETAMINOPHEN 7.5; 325 MG/1; MG/1
TABLET ORAL
Qty: 30 TABLET | Refills: 0 | Status: SHIPPED | OUTPATIENT
Start: 2018-08-02 | End: 2018-08-31

## 2018-08-02 NOTE — TELEPHONE ENCOUNTER
Pt called for an Rx for Hydrocodone-Apap 7.5-325 mg, #90, take 1 tab po q 8 hrs prn pain. [facility]       Call pt when this is done. /

## 2018-08-09 ENCOUNTER — OFFICE VISIT (OUTPATIENT)
Dept: INTERNAL MEDICINE CLINIC | Age: 68
End: 2018-08-09

## 2018-08-09 VITALS
DIASTOLIC BLOOD PRESSURE: 60 MMHG | HEIGHT: 62 IN | WEIGHT: 150 LBS | SYSTOLIC BLOOD PRESSURE: 110 MMHG | RESPIRATION RATE: 16 BRPM | OXYGEN SATURATION: 97 % | BODY MASS INDEX: 27.6 KG/M2 | HEART RATE: 103 BPM

## 2018-08-09 DIAGNOSIS — G25.81 RESTLESS LEGS SYNDROME (RLS): Chronic | ICD-10-CM

## 2018-08-09 DIAGNOSIS — Z12.11 SCREENING FOR COLON CANCER: ICD-10-CM

## 2018-08-09 DIAGNOSIS — F41.9 ANXIETY: ICD-10-CM

## 2018-08-09 DIAGNOSIS — Z12.39 SCREENING FOR BREAST CANCER: ICD-10-CM

## 2018-08-09 DIAGNOSIS — G89.4 CHRONIC PAIN SYNDROME: Primary | Chronic | ICD-10-CM

## 2018-08-09 DIAGNOSIS — I25.10 CORONARY ARTERY DISEASE INVOLVING NATIVE CORONARY ARTERY OF NATIVE HEART WITHOUT ANGINA PECTORIS: ICD-10-CM

## 2018-08-09 DIAGNOSIS — M15.9 PRIMARY OSTEOARTHRITIS INVOLVING MULTIPLE JOINTS: Chronic | ICD-10-CM

## 2018-08-09 DIAGNOSIS — E78.2 MIXED HYPERLIPIDEMIA: Chronic | ICD-10-CM

## 2018-08-09 DIAGNOSIS — I10 ESSENTIAL HYPERTENSION: Chronic | ICD-10-CM

## 2018-08-09 PROCEDURE — 99214 OFFICE O/P EST MOD 30 MIN: CPT | Performed by: INTERNAL MEDICINE

## 2018-08-09 RX ORDER — HYDROCODONE BITARTRATE AND ACETAMINOPHEN 7.5; 325 MG/1; MG/1
TABLET ORAL
Qty: 90 TABLET | Refills: 0 | Status: SHIPPED | OUTPATIENT
Start: 2018-08-09 | End: 2018-09-07

## 2018-08-09 NOTE — LETTER
reduced coughing, which are especially dangerous for patients with lung disease. Overdose or dangerous interactions with alcohol and other medications may occur, leading to death. Hyperalgesia may develop, in which patients receiving opioids for the treatment of pain may actually become more sensitive to certain painful stimuli, and in some cases, experience pain from ordinarily non-painful stimuli. Women between the ages of 14-53 who could become pregnant should carefully weigh the risks and benefits of opioids with their physicians, as these medications increase the risk of pregnancy complications, including miscarriage,  delivery and stillbirth. It is also possible for babies to be born addicted to opioids. Opioid dependence withdrawal symptoms may include; feelings of uneasiness, increased pain, irritability, belly pain, diarrhea, sweats and goose-flesh. Benzodiazepines and non-benzodiazepine sleep medications: These medications can lead to problems such as addiction/dependence, sedation, fatigue, lightheadedness, dizziness, incoordination, falls, depression, hallucinations, and impaired judgment, memory and concentration. The ability to drive and operate machinery may also be affected. Abnormal sleep-related behaviors have been reported, including sleep walking, driving, making telephone calls, eating, or having sex while not fully awake. These medications can suppress breathing and worsen sleep apnea, particularly when combined with alcohol or other sedating medications, potentially leading to death. Dependence withdrawal symptoms may include tremors, anxiety, hallucinations and seizures. Stimulants:  Common adverse effects include addiction/dependence, increased blood pressure and heart rate, decreased appetite, nausea, involuntary weight loss, insomnia, irritability, and headaches.   These risks may increase when these medications are combined with other stimulants, such as caffeine pills or energy drinks, certain weight loss supplements and oral decongestants. Dependence withdrawal symptoms may include depressed mood, loss of interest, suicidal thoughts, anxiety, fatigue, appetite changes and agitation. Testosterone replacement therapy:  Potential side effects include increased risk of stroke and heart attack, blood clots, increased blood pressure, increased cholesterol, enlarged prostate, sleep apnea, irritability/aggression and other mood disorders, and decreased fertility. Other:     1. I understand that I have the following responsibilities:  · I will take medications at the dose and frequency prescribed. · I will not increase or change how I take my medications without the approval of the health care provider who signs this Medication Agreement. · I will arrange for refills at the prescribed interval ONLY during regular office hours. I will not ask for refills earlier than agreed, after-hours, on holidays or on weekends. · I will obtain all refills for these medications at  ·  ____________________________________  pharmacy (phone number  ·  ________________________), with full consent for my provider and pharmacist to exchange information in writing or verbally. · I will not request any pain medications or controlled substances from other providers and will inform this provider of all other medications I am taking. · I will inform my other health care providers that I am taking these medications and of the existence of this [facility]. In the event of an emergency, I will provide the same information to the emergency department providers. · I will protect my prescriptions and medications. I understand that lost or misplaced prescriptions will not be replaced. · I will keep medications only for my own use and will not share them with others. I will keep all medications away from children. · My behavior is inconsistent with the responsibilities outlined above, which may also result in my being prevented from receiving further care from this office. · Other:____________________________________________________________________    AGREEMENT:    I have read the above and have had all of my questions answered. For chronic disease management, I know that my symptoms can be managed with many types of treatments. A chronic medication trial may be part of my treatment, but I must be an active participant in my care. Medication therapy is only one part of my symptom management plan. In some cases, there may be limited scientific evidence to support the chronic use of certain medications to improve symptoms and daily function. Furthermore, in certain circumstances, there may be scientific information that suggests that use of chronic controlled substances may actually worsen my symptoms and increase my risk of unintentional death directly related to this medication therapy. I know that if my provider feels my risk from controlled medications is greater than my benefit, I will have my controlled substance medication(s) compassionately lowered or removed altogether. I agree to a controlled substance medication trial.      I further agree to allow this office to contact family or friends if there are concerns about my safety and use of the controlled medications. I have agreed to use the following medications above as instructed by my physician and as stated in this [facility].      Patient Signature:  ______________________  Date:8/9/2018 or _____________    Provider Signature:______________________  Date:8/9/2018 or _____________

## 2018-09-04 ENCOUNTER — OFFICE VISIT (OUTPATIENT)
Dept: CARDIOLOGY CLINIC | Age: 68
End: 2018-09-04

## 2018-09-04 VITALS
WEIGHT: 148 LBS | HEART RATE: 70 BPM | DIASTOLIC BLOOD PRESSURE: 64 MMHG | SYSTOLIC BLOOD PRESSURE: 114 MMHG | BODY MASS INDEX: 27.23 KG/M2 | HEIGHT: 62 IN | OXYGEN SATURATION: 98 %

## 2018-09-04 DIAGNOSIS — I10 ESSENTIAL HYPERTENSION: ICD-10-CM

## 2018-09-04 DIAGNOSIS — E78.2 MIXED HYPERLIPIDEMIA: ICD-10-CM

## 2018-09-04 DIAGNOSIS — I25.10 CORONARY ARTERY DISEASE INVOLVING NATIVE CORONARY ARTERY OF NATIVE HEART WITHOUT ANGINA PECTORIS: Primary | ICD-10-CM

## 2018-09-04 DIAGNOSIS — I42.8 NICM (NONISCHEMIC CARDIOMYOPATHY) (HCC): ICD-10-CM

## 2018-09-04 PROCEDURE — 99214 OFFICE O/P EST MOD 30 MIN: CPT | Performed by: INTERNAL MEDICINE

## 2018-09-04 PROCEDURE — 93000 ELECTROCARDIOGRAM COMPLETE: CPT | Performed by: INTERNAL MEDICINE

## 2018-09-04 NOTE — PROGRESS NOTES
Pulse 70   Ht 5' 2\" (1.575 m)   Wt 148 lb (67.1 kg) Comment: did not wish to remove shoes  SpO2 98%   BMI 27.07 kg/m²     General:  Awake, alert, oriented in NAD  Skin:  Warm and dry, no unusual bruising or rash  Neck:  Supple. No JVD or carotid bruit appreciated  Chest:  Normal effort. Clear to auscultation, no wheezes/rhonchi/rales. Cardiovascular:  RRR, normal S1/S2, 1-2/6 murmur at the base of the heart  Abdomen:  Soft, nontender, +bowel sounds  Extremities:  No edema. Neurological: Grossly intact  Psychological: Normal mood and affect      Current Outpatient Prescriptions   Medication Sig Dispense Refill    HYDROcodone-acetaminophen (NORCO) 7.5-325 MG per tablet TAKE ONE TABLET BY MOUTH EVERY 8 HOURS AS NEEDED FOR PAIN. 90 tablet 0    carvedilol (COREG) 6.25 MG tablet TAKE ONE AND ONE-HALF TABLET BY MOUTH TWICE A  tablet 2    lisinopril (PRINIVIL;ZESTRIL) 5 MG tablet TAKE ONE TABLET BY MOUTH TWICE A  tablet 0    oxybutynin (DITROPAN-XL) 10 MG extended release tablet TAKE ONE TABLET BY MOUTH DAILY 90 tablet 3    atorvastatin (LIPITOR) 20 MG tablet TAKE ONE TABLET BY MOUTH DAILY 90 tablet 3    diclofenac (VOLTAREN) 75 MG EC tablet TAKE ONE TABLET BY MOUTH TWICE A DAY (Patient taking differently: TAKE ONE TABLET BY MOUTH TWICE A DAY PRN) 30 tablet 3    aspirin 81 MG tablet Take 81 mg by mouth daily      Loratadine 10 MG CAPS Take by mouth daily      ferrous sulfate 325 (65 FE) MG tablet Take 325 mg by mouth daily (with breakfast)      PROVENTIL  (90 BASE) MCG/ACT inhaler INHALE TWO PUFFS BY MOUTH EVERY 6 HOURS AS NEEDED FOR WHEEZING 6.7 g 2     No current facility-administered medications for this visit.       Labs:   Lab Results   Component Value Date    WBC 8.0 09/21/2017    HGB 14.6 09/21/2017    HCT 42.4 09/21/2017    MCV 89.9 09/21/2017     09/21/2017     Lab Results   Component Value Date     09/21/2017     09/21/2017    K 4.1 09/21/2017    K 4.0 fasting lipid panel before the next visit. Follow up in 6 months and I have advised her to have a flu vaccine     Thank you for allowing me to participate in the care of your patient. This note was scribed in the presence of Dr Mari Whitman, by Noemi Hurley RN    Physician Attestation:  The scribes documentation has been prepared under my direction and personally reviewed by me in its entirety. I confirm that the note above accurately reflects all work, treatment, procedures, and medical decision making performed by me.     Ravindra Kasper MD

## 2018-09-11 ENCOUNTER — TELEPHONE (OUTPATIENT)
Dept: INTERNAL MEDICINE CLINIC | Age: 68
End: 2018-09-11

## 2018-09-11 DIAGNOSIS — M15.9 PRIMARY OSTEOARTHRITIS INVOLVING MULTIPLE JOINTS: Chronic | ICD-10-CM

## 2018-09-11 DIAGNOSIS — G89.4 CHRONIC PAIN SYNDROME: Chronic | ICD-10-CM

## 2018-09-11 RX ORDER — HYDROCODONE BITARTRATE AND ACETAMINOPHEN 7.5; 325 MG/1; MG/1
1 TABLET ORAL EVERY 8 HOURS PRN
Qty: 90 TABLET | Refills: 0 | Status: SHIPPED | OUTPATIENT
Start: 2018-09-11 | End: 2018-10-10 | Stop reason: SDUPTHER

## 2018-10-05 ENCOUNTER — TELEPHONE (OUTPATIENT)
Dept: CARDIOLOGY CLINIC | Age: 68
End: 2018-10-05

## 2018-10-05 DIAGNOSIS — I42.9 CARDIOMYOPATHY, UNSPECIFIED TYPE (HCC): Primary | ICD-10-CM

## 2018-10-08 ENCOUNTER — TELEPHONE (OUTPATIENT)
Dept: INTERNAL MEDICINE CLINIC | Age: 68
End: 2018-10-08

## 2018-10-08 DIAGNOSIS — I10 ESSENTIAL HYPERTENSION: Primary | Chronic | ICD-10-CM

## 2018-10-08 NOTE — TELEPHONE ENCOUNTER
Pt is having bloodwork tomorrow @ [facility] outpatient lab. Labs were ordered by Dr. Alisa Jacques at  She said it is time for fasting labs and would like to get it at the same time as other labs. Please put in order today. Please call patient and let her know.

## 2018-10-09 DIAGNOSIS — E78.2 MIXED HYPERLIPIDEMIA: ICD-10-CM

## 2018-10-09 DIAGNOSIS — I10 ESSENTIAL HYPERTENSION: Chronic | ICD-10-CM

## 2018-10-09 DIAGNOSIS — I42.9 CARDIOMYOPATHY, UNSPECIFIED TYPE (HCC): ICD-10-CM

## 2018-10-09 LAB
BASOPHILS ABSOLUTE: 0.1 K/UL (ref 0–0.2)
BASOPHILS RELATIVE PERCENT: 0.9 %
EOSINOPHILS ABSOLUTE: 0.2 K/UL (ref 0–0.6)
EOSINOPHILS RELATIVE PERCENT: 2.6 %
HCT VFR BLD CALC: 38.3 % (ref 36–48)
HEMOGLOBIN: 13.3 G/DL (ref 12–16)
LYMPHOCYTES ABSOLUTE: 2.7 K/UL (ref 1–5.1)
LYMPHOCYTES RELATIVE PERCENT: 30.2 %
MCH RBC QN AUTO: 31.6 PG (ref 26–34)
MCHC RBC AUTO-ENTMCNC: 34.6 G/DL (ref 31–36)
MCV RBC AUTO: 91.5 FL (ref 80–100)
MONOCYTES ABSOLUTE: 0.6 K/UL (ref 0–1.3)
MONOCYTES RELATIVE PERCENT: 6.3 %
NEUTROPHILS ABSOLUTE: 5.3 K/UL (ref 1.7–7.7)
NEUTROPHILS RELATIVE PERCENT: 60 %
PDW BLD-RTO: 13.6 % (ref 12.4–15.4)
PLATELET # BLD: 303 K/UL (ref 135–450)
PMV BLD AUTO: 7.2 FL (ref 5–10.5)
RBC # BLD: 4.19 M/UL (ref 4–5.2)
WBC # BLD: 8.9 K/UL (ref 4–11)

## 2018-10-10 ENCOUNTER — TELEPHONE (OUTPATIENT)
Dept: INTERNAL MEDICINE CLINIC | Age: 68
End: 2018-10-10

## 2018-10-10 ENCOUNTER — TELEPHONE (OUTPATIENT)
Dept: CARDIOLOGY CLINIC | Age: 68
End: 2018-10-10

## 2018-10-10 DIAGNOSIS — M15.9 PRIMARY OSTEOARTHRITIS INVOLVING MULTIPLE JOINTS: Chronic | ICD-10-CM

## 2018-10-10 DIAGNOSIS — G89.4 CHRONIC PAIN SYNDROME: Chronic | ICD-10-CM

## 2018-10-10 LAB
A/G RATIO: 2.3 (ref 1.1–2.2)
ALBUMIN SERPL-MCNC: 4.4 G/DL (ref 3.4–5)
ALP BLD-CCNC: 132 U/L (ref 40–129)
ALT SERPL-CCNC: 17 U/L (ref 10–40)
ANION GAP SERPL CALCULATED.3IONS-SCNC: 13 MMOL/L (ref 3–16)
AST SERPL-CCNC: 21 U/L (ref 15–37)
BILIRUB SERPL-MCNC: 0.4 MG/DL (ref 0–1)
BUN BLDV-MCNC: 11 MG/DL (ref 7–20)
CALCIUM SERPL-MCNC: 9.3 MG/DL (ref 8.3–10.6)
CHLORIDE BLD-SCNC: 104 MMOL/L (ref 99–110)
CHOLESTEROL, FASTING: 173 MG/DL (ref 0–199)
CO2: 24 MMOL/L (ref 21–32)
CREAT SERPL-MCNC: 0.7 MG/DL (ref 0.6–1.2)
GFR AFRICAN AMERICAN: >60
GFR NON-AFRICAN AMERICAN: >60
GLOBULIN: 1.9 G/DL
GLUCOSE BLD-MCNC: 94 MG/DL (ref 70–99)
HDLC SERPL-MCNC: 45 MG/DL (ref 40–60)
LDL CHOLESTEROL CALCULATED: 103 MG/DL
POTASSIUM SERPL-SCNC: 4.4 MMOL/L (ref 3.5–5.1)
PRO-BNP: 39 PG/ML (ref 0–124)
SODIUM BLD-SCNC: 141 MMOL/L (ref 136–145)
TOTAL PROTEIN: 6.3 G/DL (ref 6.4–8.2)
TRIGLYCERIDE, FASTING: 125 MG/DL (ref 0–150)
TSH SERPL DL<=0.05 MIU/L-ACNC: 1.62 UIU/ML (ref 0.27–4.2)
VLDLC SERPL CALC-MCNC: 25 MG/DL

## 2018-10-10 RX ORDER — HYDROCODONE BITARTRATE AND ACETAMINOPHEN 7.5; 325 MG/1; MG/1
1 TABLET ORAL EVERY 8 HOURS PRN
Qty: 90 TABLET | Refills: 0 | Status: SHIPPED | OUTPATIENT
Start: 2018-10-10 | End: 2018-11-13 | Stop reason: SDUPTHER

## 2018-10-11 RX ORDER — DICLOFENAC SODIUM 75 MG/1
TABLET, DELAYED RELEASE ORAL
Qty: 30 TABLET | Refills: 3 | Status: SHIPPED | OUTPATIENT
Start: 2018-10-11 | End: 2019-01-24

## 2018-11-10 DIAGNOSIS — G89.4 CHRONIC PAIN SYNDROME: Chronic | ICD-10-CM

## 2018-11-10 DIAGNOSIS — M15.9 PRIMARY OSTEOARTHRITIS INVOLVING MULTIPLE JOINTS: Chronic | ICD-10-CM

## 2018-11-12 ENCOUNTER — TELEPHONE (OUTPATIENT)
Dept: INTERNAL MEDICINE CLINIC | Age: 68
End: 2018-11-12

## 2018-11-12 RX ORDER — HYDROCODONE BITARTRATE AND ACETAMINOPHEN 7.5; 325 MG/1; MG/1
1 TABLET ORAL EVERY 8 HOURS PRN
Qty: 90 TABLET | OUTPATIENT
Start: 2018-11-12 | End: 2018-12-12

## 2018-11-13 ENCOUNTER — OFFICE VISIT (OUTPATIENT)
Dept: INTERNAL MEDICINE CLINIC | Age: 68
End: 2018-11-13
Payer: MEDICARE

## 2018-11-13 VITALS
HEART RATE: 71 BPM | OXYGEN SATURATION: 98 % | DIASTOLIC BLOOD PRESSURE: 68 MMHG | SYSTOLIC BLOOD PRESSURE: 112 MMHG | WEIGHT: 150 LBS | BODY MASS INDEX: 27.44 KG/M2 | TEMPERATURE: 97.6 F

## 2018-11-13 DIAGNOSIS — E78.2 MIXED HYPERLIPIDEMIA: Chronic | ICD-10-CM

## 2018-11-13 DIAGNOSIS — I10 ESSENTIAL HYPERTENSION: Primary | Chronic | ICD-10-CM

## 2018-11-13 DIAGNOSIS — G89.4 CHRONIC PAIN SYNDROME: Chronic | ICD-10-CM

## 2018-11-13 DIAGNOSIS — M15.9 PRIMARY OSTEOARTHRITIS INVOLVING MULTIPLE JOINTS: Chronic | ICD-10-CM

## 2018-11-13 DIAGNOSIS — G25.81 RESTLESS LEGS SYNDROME (RLS): Chronic | ICD-10-CM

## 2018-11-13 DIAGNOSIS — Z12.11 SCREENING FOR COLON CANCER: ICD-10-CM

## 2018-11-13 DIAGNOSIS — I25.10 CORONARY ARTERY DISEASE INVOLVING NATIVE CORONARY ARTERY OF NATIVE HEART WITHOUT ANGINA PECTORIS: ICD-10-CM

## 2018-11-13 PROCEDURE — 99214 OFFICE O/P EST MOD 30 MIN: CPT | Performed by: INTERNAL MEDICINE

## 2018-11-13 PROCEDURE — 90732 PPSV23 VACC 2 YRS+ SUBQ/IM: CPT | Performed by: INTERNAL MEDICINE

## 2018-11-13 PROCEDURE — G0009 ADMIN PNEUMOCOCCAL VACCINE: HCPCS | Performed by: INTERNAL MEDICINE

## 2018-11-13 RX ORDER — HYDROCODONE BITARTRATE AND ACETAMINOPHEN 7.5; 325 MG/1; MG/1
1 TABLET ORAL EVERY 8 HOURS PRN
Qty: 90 TABLET | Refills: 0 | Status: SHIPPED | OUTPATIENT
Start: 2018-11-13 | End: 2018-12-13 | Stop reason: SDUPTHER

## 2018-11-13 ASSESSMENT — PATIENT HEALTH QUESTIONNAIRE - PHQ9
SUM OF ALL RESPONSES TO PHQ QUESTIONS 1-9: 0
SUM OF ALL RESPONSES TO PHQ9 QUESTIONS 1 & 2: 0
2. FEELING DOWN, DEPRESSED OR HOPELESS: 0
1. LITTLE INTEREST OR PLEASURE IN DOING THINGS: 0
SUM OF ALL RESPONSES TO PHQ QUESTIONS 1-9: 0

## 2018-11-13 NOTE — ASSESSMENT & PLAN NOTE
I counseled the patient for 10 minutes, on the importance of avoiding and trying to wean slowly off the pain medications. I explained to the patient the downside of continuing on the pain medications/benzodiazepines and their addictive and physical dependance properties. Patient expresses understanding. Controlled Substances Monitoring:     RX Monitoring 11/13/2018   Attestation The Prescription Monitoring Report for this patient was reviewed today. Documentation Possible medication side effects, risk of tolerance/dependence & alternative treatments discussed. ;No signs of potential drug abuse or diversion identified. Acute Pain Prescriptions Prescription exceeds daily limit for a specific reason. See comments or note. ;Severe pain not adequately treated with lower dose. Chronic Pain Treatment objectives documented - patient is progressing appropriately. ;Dose reduction has been attempted. ;Reviewed the patient's functional status and documentation. ;Reestablished informed consent. ;Functional status reviewed - continues with improved or maintaining ADL's. Medication Contracts Existing medication contract.

## 2018-11-13 NOTE — PROGRESS NOTES
Subjective:      Patient ID: Hailey Stiles is a 79 y.o. female. HPI  Established patient here for a visit to manage chronic medical conditions as detailed below. Chronic pain syndrome  I counseled the patient for 10 minutes, on the importance of avoiding and trying to wean slowly off the pain medications. I explained to the patient the downside of continuing on the pain medications/benzodiazepines and their addictive and physical dependance properties. Patient expresses understanding. Controlled Substances Monitoring:     RX Monitoring 11/13/2018   Attestation The Prescription Monitoring Report for this patient was reviewed today. Documentation Possible medication side effects, risk of tolerance/dependence & alternative treatments discussed. ;No signs of potential drug abuse or diversion identified. Acute Pain Prescriptions Prescription exceeds daily limit for a specific reason. See comments or note. ;Severe pain not adequately treated with lower dose. Chronic Pain Treatment objectives documented - patient is progressing appropriately. ;Dose reduction has been attempted. ;Reviewed the patient's functional status and documentation. ;Reestablished informed consent. ;Functional status reviewed - continues with improved or maintaining ADL's. Medication Contracts Existing medication contract. Essential hypertension  This is a chronic problem. The problem is well controlled. Patient monitors readings regularly. Pertinent negatives include no chest pain, focal sensory loss, focal weakness, leg pain, myalgias or shortness of breath. No headaches or chest pain. Takes medications regularly. Blood pressure has been stable, blood work was reviewed, and advised patient to continue the current instructions or medications. Mixed hyperlipidemia  This has been a chronic problem, takes meds regularly. Monitors diet and tries to follow a low fat diet. Not been very compliant w exercise.  Lipids have been stable, The problem is controlled. Recent lipid tests were reviewed and are normal. Pertinent negatives include no chest pain, focal sensory loss, focal weakness, leg pain, myalgias or shortness of breath. Advised patient to continue the current instructions or medications. Restless legs syndrome (RLS)  This problem is stable, reviewed in detail, and advised patient to continue the current instructions or medications. Will continue to closely monitor the situation. Coronary artery disease involving native coronary artery of native heart without angina pectoris  Following cardiology. Review of Systems  ROS: No unusual headaches or allergy symptoms or blurred vision. No prolonged cough. No flushing or facial pain or chest pain,dizziness, dyspnea, palpitations, or chest pain on exertion. No syncope. No nausea or vommitting or diarrhea. No jaundice or abdominal pain, change in bowel habits, black or bloody stools. No dysuria or hematuria or frequency of urination. No myalgias or muscle pain. No numbness, weakness, or tingling. No falls, or loss of consciousness. No weight loss or back pain. No falls. No paresthesias. No joint swelling or redness. No joint pain. No recent weight loss. No focal weakness or sensory deficits or paresthesias, No confusion or altered sensorium. No hematemesis. No hearing loss. No siezures. All other systems were reviewed, and review was negative. Objective:   Physical Exam  /68 (Site: Right Upper Arm, Position: Sitting, Cuff Size: Medium Adult)   Pulse 71   Temp 97.6 °F (36.4 °C) (Oral)   Wt 150 lb (68 kg)   SpO2 98%   BMI 27.44 kg/m²    The physical exam reveals a patient who appears well, alert and oriented x 3, pleasant, cooperative. Vitals are as noted. Head is atraumatic and normocephalic. Eyes reveal normal conjunctiva, cornea normal, pupils are equal and rective to light. Nasal mucosa is normal. Throat is normal without exudates.  Ears reveal normal loss, focal weakness, leg pain, myalgias or shortness of breath. No headaches or chest pain. Takes medications regularly. Blood pressure has been stable, blood work was reviewed, and advised patient to continue the current instructions or medications. Mixed hyperlipidemia  This has been a chronic problem, takes meds regularly. Monitors diet and tries to follow a low fat diet. Not been very compliant w exercise. Lipids have been stable, The problem is controlled. Recent lipid tests were reviewed and are normal. Pertinent negatives include no chest pain, focal sensory loss, focal weakness, leg pain, myalgias or shortness of breath. Advised patient to continue the current instructions or medications. Restless legs syndrome (RLS)  This problem is stable, reviewed in detail, and advised patient to continue the current instructions or medications. Will continue to closely monitor the situation. Coronary artery disease involving native coronary artery of native heart without angina pectoris  Following cardiology. Plan: This problem is stable, reviewed in detail, and advised patient to continue the current instructions or medications. Will continue to closely monitor the situation.          Da Dennison MD

## 2018-12-04 RX ORDER — CARVEDILOL 6.25 MG/1
TABLET ORAL
Qty: 270 TABLET | Refills: 1 | Status: SHIPPED | OUTPATIENT
Start: 2018-12-04 | End: 2019-05-31 | Stop reason: SDUPTHER

## 2018-12-13 ENCOUNTER — TELEPHONE (OUTPATIENT)
Dept: INTERNAL MEDICINE CLINIC | Age: 68
End: 2018-12-13

## 2018-12-13 DIAGNOSIS — G89.4 CHRONIC PAIN SYNDROME: Chronic | ICD-10-CM

## 2018-12-13 DIAGNOSIS — M15.9 PRIMARY OSTEOARTHRITIS INVOLVING MULTIPLE JOINTS: Chronic | ICD-10-CM

## 2018-12-13 RX ORDER — HYDROCODONE BITARTRATE AND ACETAMINOPHEN 7.5; 325 MG/1; MG/1
1 TABLET ORAL EVERY 8 HOURS PRN
Qty: 90 TABLET | Refills: 0 | Status: SHIPPED | OUTPATIENT
Start: 2018-12-13 | End: 2019-01-11 | Stop reason: SDUPTHER

## 2019-01-11 ENCOUNTER — TELEPHONE (OUTPATIENT)
Dept: INTERNAL MEDICINE CLINIC | Age: 69
End: 2019-01-11

## 2019-01-11 DIAGNOSIS — G89.4 CHRONIC PAIN SYNDROME: Chronic | ICD-10-CM

## 2019-01-11 DIAGNOSIS — M15.9 PRIMARY OSTEOARTHRITIS INVOLVING MULTIPLE JOINTS: Chronic | ICD-10-CM

## 2019-01-11 RX ORDER — HYDROCODONE BITARTRATE AND ACETAMINOPHEN 7.5; 325 MG/1; MG/1
1 TABLET ORAL EVERY 8 HOURS PRN
Qty: 90 TABLET | Refills: 0 | Status: SHIPPED | OUTPATIENT
Start: 2019-01-11 | End: 2019-02-10

## 2019-01-24 ENCOUNTER — OFFICE VISIT (OUTPATIENT)
Dept: FAMILY MEDICINE CLINIC | Age: 69
End: 2019-01-24
Payer: MEDICARE

## 2019-01-24 VITALS
RESPIRATION RATE: 15 BRPM | HEIGHT: 62 IN | SYSTOLIC BLOOD PRESSURE: 112 MMHG | TEMPERATURE: 98.5 F | WEIGHT: 152.6 LBS | HEART RATE: 62 BPM | BODY MASS INDEX: 28.08 KG/M2 | OXYGEN SATURATION: 99 % | DIASTOLIC BLOOD PRESSURE: 72 MMHG

## 2019-01-24 DIAGNOSIS — I25.10 CORONARY ARTERY DISEASE INVOLVING NATIVE CORONARY ARTERY OF NATIVE HEART WITHOUT ANGINA PECTORIS: ICD-10-CM

## 2019-01-24 DIAGNOSIS — G89.4 CHRONIC PAIN SYNDROME: Chronic | ICD-10-CM

## 2019-01-24 DIAGNOSIS — I10 ESSENTIAL HYPERTENSION: Primary | Chronic | ICD-10-CM

## 2019-01-24 DIAGNOSIS — Z78.0 POST-MENOPAUSAL: ICD-10-CM

## 2019-01-24 PROCEDURE — 99204 OFFICE O/P NEW MOD 45 MIN: CPT | Performed by: FAMILY MEDICINE

## 2019-01-24 ASSESSMENT — ENCOUNTER SYMPTOMS
BACK PAIN: 1
SORE THROAT: 0
COUGH: 0
EYE REDNESS: 0
SHORTNESS OF BREATH: 0
NAUSEA: 0
DIARRHEA: 0
SINUS PRESSURE: 0
COLOR CHANGE: 0
VOMITING: 0

## 2019-01-24 ASSESSMENT — PATIENT HEALTH QUESTIONNAIRE - PHQ9
2. FEELING DOWN, DEPRESSED OR HOPELESS: 0
SUM OF ALL RESPONSES TO PHQ QUESTIONS 1-9: 0
1. LITTLE INTEREST OR PLEASURE IN DOING THINGS: 0
SUM OF ALL RESPONSES TO PHQ9 QUESTIONS 1 & 2: 0
SUM OF ALL RESPONSES TO PHQ QUESTIONS 1-9: 0

## 2019-02-18 DIAGNOSIS — G89.4 CHRONIC PAIN SYNDROME: Chronic | ICD-10-CM

## 2019-02-18 DIAGNOSIS — M15.9 PRIMARY OSTEOARTHRITIS INVOLVING MULTIPLE JOINTS: Chronic | ICD-10-CM

## 2019-02-18 RX ORDER — HYDROCODONE BITARTRATE AND ACETAMINOPHEN 7.5; 325 MG/1; MG/1
1 TABLET ORAL EVERY 8 HOURS PRN
Qty: 90 TABLET | Refills: 0 | Status: SHIPPED | OUTPATIENT
Start: 2019-02-18 | End: 2019-03-22 | Stop reason: SDUPTHER

## 2019-03-04 RX ORDER — LISINOPRIL 5 MG/1
TABLET ORAL
Qty: 180 TABLET | Refills: 1 | Status: SHIPPED | OUTPATIENT
Start: 2019-03-04 | End: 2019-08-29 | Stop reason: SDUPTHER

## 2019-03-19 ENCOUNTER — OFFICE VISIT (OUTPATIENT)
Dept: CARDIOLOGY CLINIC | Age: 69
End: 2019-03-19
Payer: MEDICARE

## 2019-03-19 VITALS
DIASTOLIC BLOOD PRESSURE: 66 MMHG | OXYGEN SATURATION: 98 % | SYSTOLIC BLOOD PRESSURE: 114 MMHG | BODY MASS INDEX: 27.23 KG/M2 | WEIGHT: 148 LBS | HEIGHT: 62 IN | HEART RATE: 80 BPM

## 2019-03-19 DIAGNOSIS — I25.10 CORONARY ARTERY DISEASE INVOLVING NATIVE CORONARY ARTERY OF NATIVE HEART WITHOUT ANGINA PECTORIS: Primary | ICD-10-CM

## 2019-03-19 DIAGNOSIS — E78.2 MIXED HYPERLIPIDEMIA: ICD-10-CM

## 2019-03-19 DIAGNOSIS — R55 SYNCOPE, UNSPECIFIED SYNCOPE TYPE: ICD-10-CM

## 2019-03-19 DIAGNOSIS — R53.83 FATIGUE, UNSPECIFIED TYPE: ICD-10-CM

## 2019-03-19 DIAGNOSIS — I42.8 NICM (NONISCHEMIC CARDIOMYOPATHY) (HCC): ICD-10-CM

## 2019-03-19 DIAGNOSIS — R06.02 SOB (SHORTNESS OF BREATH): ICD-10-CM

## 2019-03-19 DIAGNOSIS — I10 ESSENTIAL HYPERTENSION: ICD-10-CM

## 2019-03-19 PROCEDURE — 93000 ELECTROCARDIOGRAM COMPLETE: CPT | Performed by: INTERNAL MEDICINE

## 2019-03-19 PROCEDURE — 99214 OFFICE O/P EST MOD 30 MIN: CPT | Performed by: INTERNAL MEDICINE

## 2019-03-20 ENCOUNTER — TELEPHONE (OUTPATIENT)
Dept: CARDIOLOGY CLINIC | Age: 69
End: 2019-03-20

## 2019-03-21 DIAGNOSIS — M15.9 PRIMARY OSTEOARTHRITIS INVOLVING MULTIPLE JOINTS: Chronic | ICD-10-CM

## 2019-03-21 DIAGNOSIS — G89.4 CHRONIC PAIN SYNDROME: Chronic | ICD-10-CM

## 2019-03-22 RX ORDER — HYDROCODONE BITARTRATE AND ACETAMINOPHEN 7.5; 325 MG/1; MG/1
1 TABLET ORAL EVERY 8 HOURS PRN
Qty: 90 TABLET | Refills: 0 | Status: SHIPPED | OUTPATIENT
Start: 2019-03-22 | End: 2019-04-21

## 2019-04-04 ENCOUNTER — HOSPITAL ENCOUNTER (OUTPATIENT)
Dept: NON INVASIVE DIAGNOSTICS | Age: 69
Discharge: HOME | End: 2019-04-04
Payer: MEDICARE

## 2019-04-04 DIAGNOSIS — R55 SYNCOPE, UNSPECIFIED SYNCOPE TYPE: ICD-10-CM

## 2019-04-04 DIAGNOSIS — R06.02 SOB (SHORTNESS OF BREATH): ICD-10-CM

## 2019-04-04 LAB
LV EF: 48 %
LVEF MODALITY: NORMAL

## 2019-04-04 PROCEDURE — 93306 TTE W/DOPPLER COMPLETE: CPT

## 2019-04-23 DIAGNOSIS — G89.4 CHRONIC PAIN SYNDROME: Chronic | ICD-10-CM

## 2019-04-23 DIAGNOSIS — M15.9 PRIMARY OSTEOARTHRITIS INVOLVING MULTIPLE JOINTS: Chronic | ICD-10-CM

## 2019-04-23 RX ORDER — HYDROCODONE BITARTRATE AND ACETAMINOPHEN 7.5; 325 MG/1; MG/1
1 TABLET ORAL EVERY 8 HOURS PRN
Qty: 90 TABLET | Refills: 0 | Status: SHIPPED | OUTPATIENT
Start: 2019-04-23 | End: 2019-05-22 | Stop reason: SDUPTHER

## 2019-04-24 ENCOUNTER — OFFICE VISIT (OUTPATIENT)
Dept: FAMILY MEDICINE CLINIC | Age: 69
End: 2019-04-24
Payer: MEDICARE

## 2019-04-24 VITALS
WEIGHT: 145.6 LBS | RESPIRATION RATE: 14 BRPM | TEMPERATURE: 98.1 F | OXYGEN SATURATION: 97 % | HEART RATE: 60 BPM | BODY MASS INDEX: 26.79 KG/M2 | SYSTOLIC BLOOD PRESSURE: 104 MMHG | DIASTOLIC BLOOD PRESSURE: 58 MMHG | HEIGHT: 62 IN

## 2019-04-24 DIAGNOSIS — I42.9 CARDIOMYOPATHY, UNSPECIFIED TYPE (HCC): ICD-10-CM

## 2019-04-24 DIAGNOSIS — I10 ESSENTIAL HYPERTENSION: ICD-10-CM

## 2019-04-24 DIAGNOSIS — M15.9 PRIMARY OSTEOARTHRITIS INVOLVING MULTIPLE JOINTS: Primary | Chronic | ICD-10-CM

## 2019-04-24 DIAGNOSIS — R53.83 FATIGUE, UNSPECIFIED TYPE: ICD-10-CM

## 2019-04-24 DIAGNOSIS — Z12.11 SCREEN FOR COLON CANCER: ICD-10-CM

## 2019-04-24 DIAGNOSIS — G89.4 CHRONIC PAIN SYNDROME: Chronic | ICD-10-CM

## 2019-04-24 LAB
ALBUMIN SERPL-MCNC: 4.4 G/DL (ref 3.4–5)
ANION GAP SERPL CALCULATED.3IONS-SCNC: 12 MMOL/L (ref 3–16)
BUN BLDV-MCNC: 12 MG/DL (ref 7–20)
CALCIUM SERPL-MCNC: 9.2 MG/DL (ref 8.3–10.6)
CHLORIDE BLD-SCNC: 103 MMOL/L (ref 99–110)
CO2: 26 MMOL/L (ref 21–32)
CREAT SERPL-MCNC: 0.9 MG/DL (ref 0.6–1.2)
GFR AFRICAN AMERICAN: >60
GFR NON-AFRICAN AMERICAN: >60
GLUCOSE BLD-MCNC: 115 MG/DL (ref 70–99)
HCT VFR BLD CALC: 39.3 % (ref 36–48)
HEMOGLOBIN: 13.4 G/DL (ref 12–16)
MCH RBC QN AUTO: 31.4 PG (ref 26–34)
MCHC RBC AUTO-ENTMCNC: 34.2 G/DL (ref 31–36)
MCV RBC AUTO: 91.8 FL (ref 80–100)
PDW BLD-RTO: 13.3 % (ref 12.4–15.4)
PHOSPHORUS: 3.4 MG/DL (ref 2.5–4.9)
PLATELET # BLD: 284 K/UL (ref 135–450)
PMV BLD AUTO: 7.7 FL (ref 5–10.5)
POTASSIUM SERPL-SCNC: 4 MMOL/L (ref 3.5–5.1)
RBC # BLD: 4.28 M/UL (ref 4–5.2)
SODIUM BLD-SCNC: 141 MMOL/L (ref 136–145)
TSH REFLEX: 2.44 UIU/ML (ref 0.27–4.2)
WBC # BLD: 8 K/UL (ref 4–11)

## 2019-04-24 PROCEDURE — 99214 OFFICE O/P EST MOD 30 MIN: CPT | Performed by: FAMILY MEDICINE

## 2019-04-24 RX ORDER — NALOXONE HYDROCHLORIDE 4 MG/.1ML
1 SPRAY NASAL PRN
Qty: 1 EACH | Refills: 2 | Status: SHIPPED | OUTPATIENT
Start: 2019-04-24 | End: 2020-04-27

## 2019-04-24 ASSESSMENT — ENCOUNTER SYMPTOMS
SHORTNESS OF BREATH: 0
COUGH: 0

## 2019-04-24 NOTE — PROGRESS NOTES
4/24/2019    Ag Cassidy is a 79 y.o. female here for follow up    HPI   Here for follow up chronic pain  - she is prescribed 7.5mg TID PRN Norco  - this is for osteoarthritis in multiple joints, including back pain and chronic pain syndrome  - trying to get out and walk more  - looking into [employer]    Feels tired, and has noticed a low BP / passing out episode  - saw Dr. Luis Mendes and has a 30 day monitor in    Weight  - she is trying to lose weight  - sees clinic in [address] [place] [address] [address] and rx'd Adipex  - trying to get out and walk more    Cardiomyopathy / CAD - follows with Dr. Luis Mendes  - 30 day event monitor in place    Review of Systems   Constitutional: Negative for chills and fever. Respiratory: Negative for cough and shortness of breath. Cardiovascular: Negative for chest pain. Neurological: Positive for light-headedness. Patient Active Problem List   Diagnosis    Mixed hyperlipidemia    Anxiety    Primary osteoarthritis involving multiple joints    Essential hypertension    Restless legs syndrome (RLS)    Cardiomyopathy ([facility] [address]    Chronic pain syndrome    Coronary artery disease involving native coronary artery of native heart without angina pectoris    Mixed incontinence     Prior to Visit Medications    Medication Sig Taking? Authorizing Provider   naloxone 4 MG/0.1ML LIQD nasal spray 1 spray by Nasal route as needed for Opioid Reversal Yes Dawna Sandifer, MD   HYDROcodone-acetaminophen (NORCO) 7.5-325 MG per tablet Take 1 tablet by mouth every 8 hours as needed for Pain for up to 30 days.  Yes Carlee Gurrola MD   atorvastatin (LIPITOR) 20 MG tablet TAKE ONE TABLET BY MOUTH DAILY Yes Carlee Gurrola MD   oxybutynin (DITROPAN-XL) 10 MG extended release tablet TAKE ONE TABLET BY MOUTH DAILY Yes Carlee Gurrola MD   lisinopril (PRINIVIL;ZESTRIL) 5 MG tablet TAKE ONE TABLET BY MOUTH TWICE A DAY Yes Bruno Valerio MD   carvedilol (COREG) 6.25 MG tablet TAKE ONE AND ONE-HALF TABLET BY MOUTH TWICE A DAY Yes Judah ECHEVERRIA Days per week: None     Minutes per session: None    Stress: None   Relationships    Social connections:     Talks on phone: None     Gets together: None     Attends Jew service: None     Active member of club or organization: None     Attends meetings of clubs or organizations: None     Relationship status: None    Intimate partner violence:     Fear of current or ex partner: None     Emotionally abused: None     Physically abused: None     Forced sexual activity: None   Other Topics Concern    None   Social History Narrative    None     No Known Allergies    LABS:  Lab Results   Component Value Date    GLUCOSE 115 (H) 04/24/2019     Lab Results   Component Value Date     04/24/2019    K 4.0 04/24/2019    CREATININE 0.9 04/24/2019     Cholesterol, Total   Date Value Ref Range Status   09/21/2017 178 0 - 199 mg/dL Final     LDL Calculated   Date Value Ref Range Status   10/09/2018 103 (H) <100 mg/dL Final     HDL   Date Value Ref Range Status   10/09/2018 45 40 - 60 mg/dL Final   10/05/2011 66 (H) 40 - 60 mg/dl Final     Triglycerides   Date Value Ref Range Status   09/21/2017 115 0 - 150 mg/dL Final     Lab Results   Component Value Date    ALT 17 10/09/2018    AST 21 10/09/2018    ALKPHOS 132 (H) 10/09/2018    BILITOT 0.4 10/09/2018     Lab Results   Component Value Date    WBC 8.0 04/24/2019    HGB 13.4 04/24/2019    HCT 39.3 04/24/2019    MCV 91.8 04/24/2019     04/24/2019     TSH (uIU/mL)   Date Value   10/09/2018 1.62        PHYSICAL EXAM  BP (!) 104/58 (Site: Right Upper Arm, Position: Sitting, Cuff Size: Small Adult)   Pulse 60   Temp 98.1 °F (36.7 °C) (Oral)   Resp 14   Ht 5' 2\" (1.575 m)   Wt 145 lb 9.6 oz (66 kg)   SpO2 97%   BMI 26.63 kg/m²     BP Readings from Last 5 Encounters:   04/24/19 (!) 104/58   03/19/19 114/66   01/24/19 112/72   11/13/18 112/68   09/04/18 114/64       Wt Readings from Last 5 Encounters:   04/24/19 145 lb 9.6 oz (66 kg)   03/19/19 148 lb (67.1 kg) 01/24/19 152 lb 9.6 oz (69.2 kg)   11/13/18 150 lb (68 kg)   09/04/18 148 lb (67.1 kg)         Physical Exam   Constitutional: She is oriented to person, place, and time. She appears well-developed and well-nourished. HENT:   Head: Normocephalic and atraumatic. Pulmonary/Chest: Effort normal.   Neurological: She is alert and oriented to person, place, and time. Skin: No pallor. Psychiatric: She has a normal mood and affect. ASSESSMENT/PLAN:  1. Primary osteoarthritis involving multiple joints  Reviewed controlled substance agreement with pt. Naloxone rx'd  - she is showing progression, walking outside, discussed continued physical activity  - no concerning findings on OARRS  - reviewed potential side effects (including but not limited to sedation, addiction) of medication  - advised to NOT take phentermine since on this medication and also with cardiomyopathy  - naloxone 4 MG/0.1ML LIQD nasal spray; 1 spray by Nasal route as needed for Opioid Reversal  Dispense: 1 each; Refill: 2    2. Chronic pain syndrome  - naloxone 4 MG/0.1ML LIQD nasal spray; 1 spray by Nasal route as needed for Opioid Reversal  Dispense: 1 each; Refill: 2    3. Cardiomyopathy, unspecified type ([address]  Event monitor in place due to syncopal episode    4. Screen for colon cancer  - AFL - Germán Cohen MD, Gastroenterology, [facility]      Return in about 3 months (around 7/24/2019) for pain f/u.

## 2019-04-25 ENCOUNTER — TELEPHONE (OUTPATIENT)
Dept: CARDIOLOGY CLINIC | Age: 69
End: 2019-04-25

## 2019-04-25 NOTE — TELEPHONE ENCOUNTER
Patient states when she saw her PCP yesterday they had discussed how fatigued she had been lately and he PCP recommended she call Dr. Yonas Stephen to discuss changing some cardiac medications. You can reach the pt at [phone].

## 2019-04-26 NOTE — TELEPHONE ENCOUNTER
I would not decrease any medications at this time until the monitor is complete since she is having tachycardia at this time.

## 2019-04-26 NOTE — TELEPHONE ENCOUNTER
Attempted to call patient. Left message on voicemail to return call. Patient wearing MCOT monitor since 4/15/19. Printed out all available reports. Last OV with Dr. Emily Liriano on 3/19/19.      Fatigue  She is complaining of increased fatigue. Will order a CBC and TSH.     Nonischemic cardiomyopathy. She appears euvolemic on exam. She seems to be tolerating her current dose of Coreg and Lisinopril. LVEF is 50-55% on most recent echo from 6/2018. Will repeat echocardiogram due to her syncopal episode she recently had.  Will repeat renal panel as well.    NYHA Class II

## 2019-05-21 PROCEDURE — 93268 ECG RECORD/REVIEW: CPT | Performed by: INTERNAL MEDICINE

## 2019-05-22 ENCOUNTER — TELEPHONE (OUTPATIENT)
Dept: CARDIOLOGY CLINIC | Age: 69
End: 2019-05-22

## 2019-05-22 DIAGNOSIS — M15.9 PRIMARY OSTEOARTHRITIS INVOLVING MULTIPLE JOINTS: Chronic | ICD-10-CM

## 2019-05-22 DIAGNOSIS — G89.4 CHRONIC PAIN SYNDROME: Chronic | ICD-10-CM

## 2019-05-22 RX ORDER — HYDROCODONE BITARTRATE AND ACETAMINOPHEN 7.5; 325 MG/1; MG/1
1 TABLET ORAL EVERY 8 HOURS PRN
Qty: 90 TABLET | Refills: 0 | Status: SHIPPED | OUTPATIENT
Start: 2019-05-22 | End: 2019-06-21 | Stop reason: SDUPTHER

## 2019-05-22 NOTE — TELEPHONE ENCOUNTER
Pt called stating that she would like to speak with 's nurse abut her event monitor results so that she has all her notes together for her next appt.        Pt can be reached at [phone]

## 2019-05-22 NOTE — TELEPHONE ENCOUNTER
Event monitor results  NSR   Few episodes of PAF and PSVT   Needs to see EP for further evaluation    Called and left message for her to return call.

## 2019-05-22 NOTE — TELEPHONE ENCOUNTER
Called patient and she wants a copy of her monitor prior to appointment. She would like to know it we can print it out for her so she can  at our office.

## 2019-05-23 NOTE — TELEPHONE ENCOUNTER
Spoke to Patient. Informed her that once her monitor is scanned into [facility] that I can give her a copy of the report. Patient verbalized and confirmed understanding.

## 2019-05-24 ENCOUNTER — TELEPHONE (OUTPATIENT)
Dept: CARDIOLOGY CLINIC | Age: 69
End: 2019-05-24

## 2019-05-24 NOTE — TELEPHONE ENCOUNTER
Please contact patient to reschedule her 7/3/19 appointment with Dr Gera Clinton  to one of his opening on 7/10/19.

## 2019-05-29 NOTE — TELEPHONE ENCOUNTER
This is being rescheduled because it looks like I made a mistake a double booked him. I can call her to reschedule this if you would like?

## 2019-05-31 RX ORDER — CARVEDILOL 6.25 MG/1
TABLET ORAL
Qty: 270 TABLET | Refills: 0 | Status: SHIPPED | OUTPATIENT
Start: 2019-05-31 | End: 2019-09-10 | Stop reason: SDUPTHER

## 2019-06-03 DIAGNOSIS — R55 SYNCOPE, UNSPECIFIED SYNCOPE TYPE: ICD-10-CM

## 2019-06-21 DIAGNOSIS — M15.9 PRIMARY OSTEOARTHRITIS INVOLVING MULTIPLE JOINTS: Chronic | ICD-10-CM

## 2019-06-21 DIAGNOSIS — G89.4 CHRONIC PAIN SYNDROME: Chronic | ICD-10-CM

## 2019-06-21 RX ORDER — HYDROCODONE BITARTRATE AND ACETAMINOPHEN 7.5; 325 MG/1; MG/1
1 TABLET ORAL EVERY 8 HOURS PRN
Qty: 90 TABLET | Refills: 0 | Status: SHIPPED | OUTPATIENT
Start: 2019-06-21 | End: 2019-07-19 | Stop reason: SDUPTHER

## 2019-07-02 NOTE — PROGRESS NOTES
were addressed to the patient/family. Alternatives to my treatment were discussed. This note was scribed in the presence of Catracho Hawkins MD by Dario Vegas RN. The scribe's documentation has been prepared under my direction and personally reviewed by me in its entirety. I confirm that the note above accurately reflects all work, physical examination, the discussion of treatments and procedures, and medical decision making performed by me.       Catracho Hawkins MD, MS, [facility], [facility]  Cardiac Electrophysiology  [address]  [address] [facility] [place], [address] [place] [address]  Neptali Melvin [facility] [address]  [phone]

## 2019-07-03 ENCOUNTER — OFFICE VISIT (OUTPATIENT)
Dept: CARDIOLOGY CLINIC | Age: 69
End: 2019-07-03
Payer: MEDICARE

## 2019-07-03 VITALS
WEIGHT: 146 LBS | HEART RATE: 74 BPM | SYSTOLIC BLOOD PRESSURE: 106 MMHG | OXYGEN SATURATION: 98 % | HEIGHT: 62 IN | BODY MASS INDEX: 26.87 KG/M2 | DIASTOLIC BLOOD PRESSURE: 64 MMHG

## 2019-07-03 DIAGNOSIS — I48.92 ATRIAL FLUTTER, UNSPECIFIED TYPE (HCC): ICD-10-CM

## 2019-07-03 DIAGNOSIS — R55 VASOVAGAL SYNCOPE: ICD-10-CM

## 2019-07-03 DIAGNOSIS — R00.2 PALPITATIONS: Primary | ICD-10-CM

## 2019-07-03 DIAGNOSIS — I48.91 ATRIAL FIBRILLATION, UNSPECIFIED TYPE (HCC): ICD-10-CM

## 2019-07-03 PROCEDURE — 93000 ELECTROCARDIOGRAM COMPLETE: CPT | Performed by: INTERNAL MEDICINE

## 2019-07-03 PROCEDURE — 93228 REMOTE 30 DAY ECG REV/REPORT: CPT | Performed by: INTERNAL MEDICINE

## 2019-07-03 PROCEDURE — 99215 OFFICE O/P EST HI 40 MIN: CPT | Performed by: INTERNAL MEDICINE

## 2019-07-03 NOTE — PATIENT INSTRUCTIONS
affect apixaban? Sometimes it is not safe to use certain medications at the same time. Some drugs can affect your blood levels of other drugs you take, which may increase side effects or make the medications less effective. Many other drugs (including some over-the-counter medicines) can increase your risk of bleeding or blood clots, or your risk of developing blood clots around the brain or spinal cord during a spinal tap or epidural. It is very important to tell your doctor about all medicines you have recently used, especially:  · any other medicines to treat or prevent blood clots;  · a blood thinner such as heparin or warfarin (Coumadin, Jantoven);  · an antidepressant; or  · an NSAID (nonsteroidal anti-inflammatory drug) used long term. This list is not complete and many other drugs may affect apixaban. This includes prescription and over-the-counter medicines, vitamins, and herbal products. Not all possible drug interactions are listed here. Where can I get more information? Your pharmacist can provide more information about apixaban. Remember, keep this and all other medicines out of the reach of children, never share your medicines with others, and use this medication only for the indication prescribed. Every effort has been made to ensure that the information provided by [address] is accurate, up-to-date, and complete, but no guarantee is made to that effect. Drug information contained herein may be time sensitive. [facility] information has been compiled for use by healthcare practitioners and consumers in the [facility] and therefore [facility] does not warrant that uses outside of the [facility] are appropriate, unless specifically indicated otherwise. [facility]'s drug information does not endorse drugs, diagnose patients or recommend therapy.  [facility]'s drug information is an informational resource designed to assist licensed healthcare practitioners in caring for their patients

## 2019-07-03 NOTE — LETTER
mouth every 8 hours as needed for Pain for up to 30 days. 6/21/19 7/21/19 Yes Sloane Gurrola MD   carvedilol (COREG) 6.25 MG tablet TAKE ONE AND ONE-HALF TABLET BY MOUTH TWICE A DAY 5/31/19  Yes Gita Landa MD   naloxone 4 MG/0.1ML LIQD nasal spray 1 spray by Nasal route as needed for Opioid Reversal 4/24/19  Yes Sloane Gurrola MD   atorvastatin (LIPITOR) 20 MG tablet TAKE ONE TABLET BY MOUTH DAILY 3/4/19  Yes Sloane Gurrola MD   oxybutynin (DITROPAN-XL) 10 MG extended release tablet TAKE ONE TABLET BY MOUTH DAILY 3/4/19  Yes Sloane Gurrola MD   lisinopril (PRINIVIL;ZESTRIL) 5 MG tablet TAKE ONE TABLET BY MOUTH TWICE A DAY 3/4/19  Yes Bette Dooley MD   aspirin 81 MG tablet Take 81 mg by mouth daily   Yes Historical Provider, MD   Loratadine 10 MG CAPS Take by mouth daily   Yes Historical Provider, MD   ferrous sulfate 325 (65 FE) MG tablet Take 325 mg by mouth daily (with breakfast)   Yes Historical Provider, MD   PROVENTIL  (90 BASE) MCG/ACT inhaler INHALE TWO PUFFS BY MOUTH EVERY 6 HOURS AS NEEDED FOR WHEEZING 1/5/13  Yes Raegan Lugo MD       Social History:   reports that she quit smoking about 14 years ago. She has never used smokeless tobacco. She reports that she does not drink alcohol or use drugs. Family History:  family history includes Alcohol Abuse in her brother and son; Arthritis in an other family member; Bleeding Prob in an other family member; Cancer in an other family member; Diabetes in her brother and another family member; Heart Disease in her mother and sister; Hypertension in an other family member; Kidney Disease in an other family member; Prostate Cancer in her father; Seizures in an other family member; Stroke in an other family member; Substance Abuse in her brother and son. Reviewed.  Denies family history of sudden cardiac death, arrhythmia, premature CAD    Review of System:    · General ROS: negative for - chills, fever individuals would then develop persistent atrial fibrillation/atrial flutter. Once persistence is reached, permanent atrial dysrhythmia is inevitable. Treatment options including cardioversion, anti-arrhythmic medication therapy, rate control strategy, oral anticoagulation, and atrial fibrillation ablation were discussed with patient. Risks, benefits and alternative of each treatment options were explained. We discussed different management options for both atrial tachydysrhythmia including their risks and benefits. These options include use of cardioversion (mainly for persisting atrial fibrillation or atrial flutter) which provides an effective immediate therapy with success rates of 75% or higher, but it provides no short nor long term efficacy. Anti-arrhythmic medications provide a very effective short term therapy, but even with our most potent anti-arrhythmic medication there is limited long term efficacy (clinical studies have shown that 40% of patients remain atrial fibrillation-free after 4 years of follow-up after starting one of the more powerful anti-arrhythmic medication (amiodarone), and, if extrapolated, may have further diminishing success as time goes on). Against atrial flutter, any anti-arrhythmic medication would be nearly ineffective. Atrial fibrillation and atrial flutter ablation is a potentially curative therapy with very reasonable success rate after a first time procedure and with improving success rates with subsequent procedures. The patient wishes to speak with her family and come back at a later time to re-discuss the ablation procedure with her son and  present. Vasovagal Syncope  No further episodes reported. She report the episode occurred after straining on the toilet (defecation syncope). She reports she got up felt light headed went to couch and then passed out 5 minutes later.     Follow up in next two months to further discuss atrial fibrillation and

## 2019-07-10 ENCOUNTER — TELEPHONE (OUTPATIENT)
Dept: CARDIOLOGY CLINIC | Age: 69
End: 2019-07-10

## 2019-07-19 DIAGNOSIS — M15.9 PRIMARY OSTEOARTHRITIS INVOLVING MULTIPLE JOINTS: Chronic | ICD-10-CM

## 2019-07-19 DIAGNOSIS — G89.4 CHRONIC PAIN SYNDROME: Chronic | ICD-10-CM

## 2019-07-19 RX ORDER — DICLOFENAC POTASSIUM 50 MG/1
1 TABLET, FILM COATED ORAL
COMMUNITY
End: 2019-12-27 | Stop reason: SDUPTHER

## 2019-07-19 RX ORDER — HYDROCODONE BITARTRATE AND ACETAMINOPHEN 7.5; 325 MG/1; MG/1
1 TABLET ORAL EVERY 8 HOURS PRN
Qty: 90 TABLET | Refills: 0 | Status: SHIPPED | OUTPATIENT
Start: 2019-07-19 | End: 2019-08-19 | Stop reason: SDUPTHER

## 2019-08-18 ENCOUNTER — PATIENT MESSAGE (OUTPATIENT)
Dept: FAMILY MEDICINE CLINIC | Age: 69
End: 2019-08-18

## 2019-08-18 DIAGNOSIS — M15.9 PRIMARY OSTEOARTHRITIS INVOLVING MULTIPLE JOINTS: Chronic | ICD-10-CM

## 2019-08-18 DIAGNOSIS — G89.4 CHRONIC PAIN SYNDROME: Chronic | ICD-10-CM

## 2019-08-19 RX ORDER — HYDROCODONE BITARTRATE AND ACETAMINOPHEN 7.5; 325 MG/1; MG/1
1 TABLET ORAL EVERY 8 HOURS PRN
Qty: 90 TABLET | Refills: 0 | Status: SHIPPED | OUTPATIENT
Start: 2019-08-19 | End: 2019-09-19 | Stop reason: SDUPTHER

## 2019-08-20 NOTE — PROGRESS NOTES
3 mmHg.     3. Stress Test:  9/24/15  IMPRESSION:       Decreased activity within the anteroseptal wall and apex on stress   images suggesting stress-induced ischemia     4. Cath: 9/28/15  SUMMARY:  1. Patent right coronary artery and its branches. 2.  Mild disease of the left main trunk. 3.  Patent left anterior descending artery and its branches. 4.  Patent circumflex artery and its branches. 5.  Left ventricular systolic dysfunction with estimated ejection fraction of 30% to 35%. The MCOT, echocardiogram, stress test, and coronary angiography/PCI were reviewed by myself and used for my plan of care. Procedures:  1. none    Assessment/Plan:     Atrial fibrillation and Atrial Flutter  -symptomatic with fatigue  -LVEF 45 to 50% per ECHO 19, improved from 35-40% from ECHO 2016  -[id number] Score 4 (HTN, AGE, SEX, CHF) and meets a Class I indication for [facility] for thromboembolic prophylaxis. Currently on Eliquis 5 mg BID for stroke risk reduction (Crea 0.9 19)  -first seen during 30 day event monitoring showed NSR with few episodes of atrial fibrillation and atrial aflutter.  -Events seen during monitorin19:  5:00 am afib 149 bpm patient reports she was awake had been up that night playing cards. 19: 8:50 pm atrial flutter 142 bpm Reports she took diet pills on 19 in attempt to loss weight. -EKG today confirms SR    -Again we educated the patient that atrial fibrillation and atrial flutter are both progressive diseases, with more frequent episodes that will ensue. Subsequent episodes usually become more sustained to the extent that many individuals would then develop persistent atrial fibrillation/atrial flutter. Once persistence is reached, permanent atrial dysrhythmia is inevitable. Treatment options including cardioversion, anti-arrhythmic medication therapy, rate control strategy, oral anticoagulation, and atrial fibrillation ablation were discussed with patient.  Risks,

## 2019-08-21 ENCOUNTER — OFFICE VISIT (OUTPATIENT)
Dept: CARDIOLOGY CLINIC | Age: 69
End: 2019-08-21
Payer: MEDICARE

## 2019-08-21 VITALS
WEIGHT: 146 LBS | DIASTOLIC BLOOD PRESSURE: 64 MMHG | BODY MASS INDEX: 26.87 KG/M2 | HEART RATE: 63 BPM | SYSTOLIC BLOOD PRESSURE: 116 MMHG | OXYGEN SATURATION: 98 % | HEIGHT: 62 IN

## 2019-08-21 DIAGNOSIS — I25.10 CORONARY ARTERY DISEASE INVOLVING NATIVE CORONARY ARTERY OF NATIVE HEART WITHOUT ANGINA PECTORIS: Primary | ICD-10-CM

## 2019-08-21 PROCEDURE — 93000 ELECTROCARDIOGRAM COMPLETE: CPT | Performed by: INTERNAL MEDICINE

## 2019-08-21 PROCEDURE — 99215 OFFICE O/P EST HI 40 MIN: CPT | Performed by: INTERNAL MEDICINE

## 2019-08-21 NOTE — PATIENT INSTRUCTIONS
your anesthesia provider. The anesthesia may make you sleep. Or it may just numb the area being worked on.     · EPS by itself may take 1 to 3 hours. But if you also have ablation, the whole procedure may take 2 to 6 hours.     · After the procedure, pressure will be applied to the area where the catheter was put in your blood vessel. Then the area may be covered with a bandage or a compression device. This will prevent bleeding.     · Nurses will check your heart rate and blood pressure. The nurse will also check the catheter site for bleeding.     · If the catheter was put in your groin, you will need to lie still and keep your leg straight for several hours. The nurse may put a weighted bag on your leg to keep it still.     · If the catheter was put in your arm, you may be able to sit up and get out of bed right away. But you will need to keep your arm still for at least 1 hour.     · You may have a bruise or a small lump where the catheter was put in your blood vessel. This is normal and will go away. Going home   · Be sure you have someone to drive you home. Anesthesia and pain medicine make it unsafe for you to drive.     · You will be given more specific instructions about recovering from your procedure. They will cover things like diet, wound care, follow-up care, driving, and getting back to your normal routine. When should you call your doctor? · You have questions or concerns.     · You don't understand how to prepare for your procedure.     · You become ill before the procedure (such as fever, flu, or a cold).     · You need to reschedule or have changed your mind about having the procedure. Where can you learn more? Go to https://[email or web address]. org and sign in to your [employer] account. Enter D501 in the e-channel box to learn more about \"Electrophysiology Study and Catheter Ablation: Before Your Procedure. \"     If you do not have an account, please click on the \"Sign or liability for your use of this information.

## 2019-08-26 ENCOUNTER — TELEPHONE (OUTPATIENT)
Dept: CARDIOLOGY CLINIC | Age: 69
End: 2019-08-26

## 2019-08-26 DIAGNOSIS — I48.92 ATRIAL FLUTTER, UNSPECIFIED TYPE (HCC): Primary | ICD-10-CM

## 2019-08-26 NOTE — TELEPHONE ENCOUNTER
Left message for patient to call to schedule her Right Atrial Flutter Ablation. Right Atrial Flutter Ablation   Procedure Date : 10/15/19  Arrival Time: 11:00 am  Procedure Time: 12:30 pm    The morning of your procedure you will park in the hospital parking lot and report directly to the cath lab to check in.      Pre-Procedure Instructions   1. You will need to fast for at least 8 hours prior to procedure. 2. Do NOT hold your Eliquis prior to the procedure  3. Do not use any lotions, creams or perfume the morning of procedure. 4. Pre-procedure lab work will need to be completed 5-7 days prior to procedure. 5. Please have a responsible adult to drive you home after procedure, you may stay overnight.    6. Cath lab will provide you with all post procedure instructions     1 month follow up will be scheduled with Dr. Maria L Tate post procedure

## 2019-08-29 RX ORDER — LISINOPRIL 5 MG/1
TABLET ORAL
Qty: 180 TABLET | Refills: 3 | Status: SHIPPED | OUTPATIENT
Start: 2019-08-29 | End: 2020-03-03 | Stop reason: SDUPTHER

## 2019-09-10 RX ORDER — CARVEDILOL 6.25 MG/1
6.25 TABLET ORAL 2 TIMES DAILY WITH MEALS
Qty: 270 TABLET | Refills: 0 | Status: SHIPPED | OUTPATIENT
Start: 2019-09-10 | End: 2019-12-04 | Stop reason: SDUPTHER

## 2019-09-19 ENCOUNTER — PATIENT MESSAGE (OUTPATIENT)
Dept: FAMILY MEDICINE CLINIC | Age: 69
End: 2019-09-19

## 2019-09-19 DIAGNOSIS — M15.9 PRIMARY OSTEOARTHRITIS INVOLVING MULTIPLE JOINTS: Chronic | ICD-10-CM

## 2019-09-19 DIAGNOSIS — G89.4 CHRONIC PAIN SYNDROME: Chronic | ICD-10-CM

## 2019-09-19 RX ORDER — HYDROCODONE BITARTRATE AND ACETAMINOPHEN 7.5; 325 MG/1; MG/1
1 TABLET ORAL EVERY 8 HOURS PRN
Qty: 90 TABLET | Refills: 0 | Status: SHIPPED | OUTPATIENT
Start: 2019-09-19 | End: 2019-10-22 | Stop reason: SDUPTHER

## 2019-09-19 NOTE — TELEPHONE ENCOUNTER
From: Samantha Jackson  To: Winter Gurrola MD  Sent: 9/19/2019 12:32 AM EDT  Subject: Prescription Question    Please call my [address] in [place] [phone] to refill my prescription for Hydrocodone-Acetaminophen 7.5-325, qty. 90. Take one tablet by mouth every 8 hours as needed for pain.    Thank you

## 2019-09-20 ENCOUNTER — OFFICE VISIT (OUTPATIENT)
Dept: FAMILY MEDICINE CLINIC | Age: 69
End: 2019-09-20
Payer: MEDICARE

## 2019-09-20 VITALS
HEART RATE: 86 BPM | HEIGHT: 62 IN | BODY MASS INDEX: 24.8 KG/M2 | OXYGEN SATURATION: 98 % | DIASTOLIC BLOOD PRESSURE: 60 MMHG | TEMPERATURE: 99.1 F | WEIGHT: 134.8 LBS | SYSTOLIC BLOOD PRESSURE: 94 MMHG | RESPIRATION RATE: 15 BRPM

## 2019-09-20 DIAGNOSIS — I48.0 PAROXYSMAL ATRIAL FIBRILLATION (HCC): ICD-10-CM

## 2019-09-20 DIAGNOSIS — Z23 NEEDS FLU SHOT: ICD-10-CM

## 2019-09-20 DIAGNOSIS — G89.4 CHRONIC PAIN SYNDROME: Chronic | ICD-10-CM

## 2019-09-20 DIAGNOSIS — G89.29 CHRONIC PAIN OF BOTH KNEES: ICD-10-CM

## 2019-09-20 DIAGNOSIS — M15.9 PRIMARY OSTEOARTHRITIS INVOLVING MULTIPLE JOINTS: Primary | Chronic | ICD-10-CM

## 2019-09-20 DIAGNOSIS — M25.562 CHRONIC PAIN OF BOTH KNEES: ICD-10-CM

## 2019-09-20 DIAGNOSIS — M25.561 CHRONIC PAIN OF BOTH KNEES: ICD-10-CM

## 2019-09-20 PROCEDURE — G0008 ADMIN INFLUENZA VIRUS VAC: HCPCS | Performed by: FAMILY MEDICINE

## 2019-09-20 PROCEDURE — 90653 IIV ADJUVANT VACCINE IM: CPT | Performed by: FAMILY MEDICINE

## 2019-09-20 PROCEDURE — 80305 DRUG TEST PRSMV DIR OPT OBS: CPT | Performed by: FAMILY MEDICINE

## 2019-09-20 PROCEDURE — 99214 OFFICE O/P EST MOD 30 MIN: CPT | Performed by: FAMILY MEDICINE

## 2019-09-20 RX ORDER — GABAPENTIN 100 MG/1
100 CAPSULE ORAL 2 TIMES DAILY PRN
Qty: 60 CAPSULE | Refills: 0 | Status: SHIPPED | OUTPATIENT
Start: 2019-09-20 | End: 2019-10-19 | Stop reason: SDUPTHER

## 2019-09-20 ASSESSMENT — ENCOUNTER SYMPTOMS
COUGH: 0
BACK PAIN: 1
SHORTNESS OF BREATH: 0

## 2019-09-20 NOTE — PROGRESS NOTES
(DITROPAN-XL) 10 MG extended release tablet Take 1 tablet by mouth daily Yes Radha Elizondo MD   lisinopril (PRINIVIL;ZESTRIL) 5 MG tablet TAKE ONE TABLET BY MOUTH TWICE A DAY Yes Laine Stevens MD   diclofenac (CATAFLAM) 50 MG tablet Take 1 tablet by mouth Yes Historical Provider, MD   apixaban (ELIQUIS) 5 MG TABS tablet Take 1 tablet by mouth 2 times daily Yes Kory Sandra MD   naloxone 4 MG/0.1ML LIQD nasal spray 1 spray by Nasal route as needed for Opioid Reversal Yes Radha Elizondo MD   aspirin 81 MG tablet Take 81 mg by mouth daily Yes Historical Provider, MD   Loratadine 10 MG CAPS Take by mouth daily Yes Historical Provider, MD   ferrous sulfate 325 (65 FE) MG tablet Take 325 mg by mouth daily (with breakfast) Yes Historical Provider, MD   PROVENTIL  (80 BASE) MCG/ACT inhaler INHALE TWO PUFFS BY MOUTH EVERY 6 HOURS AS NEEDED FOR WHEEZING Yes Sudhir Angela MD     Health Maintenance   Topic Date Due    Hepatitis C screen  1950    Shingles Vaccine (1 of 2) 10/15/2002    DEXA (modify frequency per FRAX score)  10/15/2017    Annual Wellness Visit (AWV)  05/29/2019    Potassium monitoring  04/24/2020    Creatinine monitoring  04/24/2020    Breast cancer screen  08/09/2020    DTaP/Tdap/Td vaccine (2 - Td) 09/14/2022    Lipid screen  10/09/2023    Colon cancer screen colonoscopy  11/24/2024    Flu vaccine  Completed    Pneumococcal 65+ years Vaccine  Completed     Past Medical History:   Diagnosis Date    Anxiety     Arthritis     DJD (degenerative joint disease)     Essential hypertension 6/8/2012    Hyperlipidemia     Hypertension 6/8/2012    Mixed hyperlipidemia     Primary osteoarthritis involving multiple joints     Restless legs syndrome (RLS) 11/24/2014     Social History     Socioeconomic History    Marital status:      Spouse name: Not on file    Number of children: 2    Years of education: Not on file    Highest education level: Not on file   Occupational History    Not on file   Social Needs    Financial resource strain: Not on file    Food insecurity:     Worry: Not on file     Inability: Not on file    Transportation needs:     Medical: Not on file     Non-medical: Not on file   Tobacco Use    Smoking status: Former Smoker     Last attempt to quit: 10/27/2004     Years since quittin.9    Smokeless tobacco: Never Used   Substance and Sexual Activity    Alcohol use: No    Drug use: No    Sexual activity: Not Currently     Comment:    Lifestyle    Physical activity:     Days per week: Not on file     Minutes per session: Not on file    Stress: Not on file   Relationships    Social connections:     Talks on phone: Not on file     Gets together: Not on file     Attends Christianity service: Not on file     Active member of club or organization: Not on file     Attends meetings of clubs or organizations: Not on file     Relationship status: Not on file    Intimate partner violence:     Fear of current or ex partner: Not on file     Emotionally abused: Not on file     Physically abused: Not on file     Forced sexual activity: Not on file   Other Topics Concern    Not on file   Social History Narrative    Not on file     No Known Allergies    LABS:  Lab Results   Component Value Date    GLUCOSE 115 (H) 2019     Lab Results   Component Value Date     2019    K 4.0 2019    CREATININE 0.9 2019     Cholesterol, Total   Date Value Ref Range Status   2017 178 0 - 199 mg/dL Final     LDL Calculated   Date Value Ref Range Status   10/09/2018 103 (H) <100 mg/dL Final     HDL   Date Value Ref Range Status   10/09/2018 45 40 - 60 mg/dL Final   10/05/2011 66 (H) 40 - 60 mg/dl Final     Triglycerides   Date Value Ref Range Status   2017 115 0 - 150 mg/dL Final     Lab Results   Component Value Date    ALT 17 10/09/2018    AST 21 10/09/2018    ALKPHOS 132 (H) 10/09/2018    BILITOT 0.4 10/09/2018     Lab Results

## 2019-12-04 RX ORDER — CARVEDILOL 6.25 MG/1
6.25 TABLET ORAL 2 TIMES DAILY WITH MEALS
Qty: 270 TABLET | Refills: 0 | Status: SHIPPED | OUTPATIENT
Start: 2019-12-04 | End: 2020-04-15

## 2019-12-09 RX ORDER — CARVEDILOL 6.25 MG/1
TABLET ORAL
Qty: 180 TABLET | Refills: 0 | OUTPATIENT
Start: 2019-12-09

## 2019-12-09 RX ORDER — DICLOFENAC POTASSIUM 50 MG/1
50 TABLET, FILM COATED ORAL DAILY
Qty: 90 TABLET | Refills: 1 | OUTPATIENT
Start: 2019-12-09

## 2019-12-19 DIAGNOSIS — G89.4 CHRONIC PAIN SYNDROME: Chronic | ICD-10-CM

## 2019-12-19 DIAGNOSIS — M15.9 PRIMARY OSTEOARTHRITIS INVOLVING MULTIPLE JOINTS: Chronic | ICD-10-CM

## 2019-12-19 RX ORDER — HYDROCODONE BITARTRATE AND ACETAMINOPHEN 7.5; 325 MG/1; MG/1
1 TABLET ORAL EVERY 8 HOURS PRN
Qty: 90 TABLET | Refills: 0 | Status: SHIPPED | OUTPATIENT
Start: 2019-12-19 | End: 2020-01-18

## 2019-12-26 ENCOUNTER — PATIENT MESSAGE (OUTPATIENT)
Dept: FAMILY MEDICINE CLINIC | Age: 69
End: 2019-12-26

## 2019-12-26 RX ORDER — DICLOFENAC POTASSIUM 50 MG/1
50 TABLET, FILM COATED ORAL 2 TIMES DAILY PRN
Qty: 60 TABLET | Refills: 1 | OUTPATIENT
Start: 2019-12-26

## 2019-12-27 RX ORDER — DICLOFENAC POTASSIUM 50 MG/1
50 TABLET, FILM COATED ORAL 2 TIMES DAILY PRN
Qty: 60 TABLET | Refills: 2 | Status: SHIPPED | OUTPATIENT
Start: 2019-12-27 | End: 2021-06-09

## 2019-12-27 RX ORDER — DICLOFENAC POTASSIUM 50 MG/1
50 TABLET, FILM COATED ORAL DAILY
Qty: 90 TABLET | Refills: 0 | Status: CANCELLED | OUTPATIENT
Start: 2019-12-27

## 2020-01-21 ENCOUNTER — OFFICE VISIT (OUTPATIENT)
Dept: CARDIOLOGY CLINIC | Age: 70
End: 2020-01-21
Payer: MEDICARE

## 2020-01-21 VITALS
HEART RATE: 72 BPM | BODY MASS INDEX: 27.42 KG/M2 | OXYGEN SATURATION: 97 % | SYSTOLIC BLOOD PRESSURE: 114 MMHG | DIASTOLIC BLOOD PRESSURE: 60 MMHG | WEIGHT: 149 LBS | HEIGHT: 62 IN

## 2020-01-21 PROCEDURE — 99214 OFFICE O/P EST MOD 30 MIN: CPT | Performed by: INTERNAL MEDICINE

## 2020-01-21 NOTE — PATIENT INSTRUCTIONS
a pneumococcal vaccine shot. If you have had one before, ask your doctor whether you need another dose. Get a flu shot every year. If you must be around people with colds or flu, wash your hands often. Activity    · If your doctor recommends it, get more exercise. Walking is a good choice. Bit by bit, increase the amount you walk every day. Try for at least 30 minutes on most days of the week. You also may want to swim, bike, or do other activities. Your doctor may suggest that you join a cardiac rehabilitation program so that you can have help increasing your physical activity safely.     · Start light exercise if your doctor says it is okay. Even a small amount will help you get stronger, have more energy, and manage stress. Walking is an easy way to get exercise. Start out by walking a little more than you did in the hospital. Gradually increase the amount you walk.     · When you exercise, watch for signs that your heart is working too hard. You are pushing too hard if you cannot talk while you are exercising. If you become short of breath or dizzy or have chest pain, sit down and rest immediately.     · Check your pulse regularly. Place two fingers on the artery at the palm side of your wrist, in line with your thumb. If your heartbeat seems uneven or fast, talk to your doctor. When should you call for help? Call 911 anytime you think you may need emergency care. For example, call if:    · You have symptoms of a heart attack. These may include:  ? Chest pain or pressure, or a strange feeling in the chest.  ? Sweating. ? Shortness of breath. ? Nausea or vomiting. ? Pain, pressure, or a strange feeling in the back, neck, jaw, or upper belly or in one or both shoulders or arms. ? Lightheadedness or sudden weakness. ? A fast or irregular heartbeat. After you call  911, the  may tell you to chew 1 adult-strength or 2 to 4 low-dose aspirin. Wait for an ambulance.  Do not try to drive yourself.     · You have symptoms of a stroke. These may include:  ? Sudden numbness, tingling, weakness, or loss of movement in your face, arm, or leg, especially on only one side of your body. ? Sudden vision changes. ? Sudden trouble speaking. ? Sudden confusion or trouble understanding simple statements. ? Sudden problems with walking or balance. ? A sudden, severe headache that is different from past headaches.     · You passed out (lost consciousness).    Call your doctor now or seek immediate medical care if:    · You have new or increased shortness of breath.     · You feel dizzy or lightheaded, or you feel like you may faint.     · Your heart rate becomes irregular.     · You can feel your heart flutter in your chest or skip heartbeats. Tell your doctor if these symptoms are new or worse.    Watch closely for changes in your health, and be sure to contact your doctor if you have any problems. Where can you learn more? Go to [email or web address] org and sign in to your [employer] account. Enter U020 in the [employer] box to learn more about \"Atrial Fibrillation: Care Instructions. \"     If you do not have an account, please click on the \"Sign Up Now\" link. Current as of: April 9, 2019  Content Version: 12.3  © 2819-0038 Healthwise, Incorporated. Care instructions adapted under license by [facility] ([facility]). If you have questions about a medical condition or this instruction, always ask your healthcare professional. [place] [address] any warranty or liability for your use of this information.

## 2020-01-21 NOTE — PROGRESS NOTES
[address] - Cardiology    CC: \"I have a sore throat. \"    History of present illness: Con Toscano is a 81 y.o. female with past medical history significant for hypertension, hyperlipidemia and was recently diagnosed with non ischemic cardiomyopathy (9/2015). She is here today for a routine follow up. She says that she has had a sore throat and cough for over a month. She has been dealing with losing her mom. She reports that her legs go numb at night. She does not notice her heart racing since she is not active. She has stopped the Eliquis at this time. Past Medical History:    Past Medical History:   Diagnosis Date    Anxiety     Arthritis     DJD (degenerative joint disease)     Essential hypertension 6/8/2012    Hyperlipidemia     Hypertension 6/8/2012    Mixed hyperlipidemia     Primary osteoarthritis involving multiple joints     Restless legs syndrome (RLS) 11/24/2014     Past Surgical History:     Past Surgical History:   Procedure Laterality Date    BREAST ENHANCEMENT SURGERY      CATARACT REMOVAL WITH IMPLANT Bilateral 2015    WRIST FRACTURE SURGERY  4/10/12    Right     Social History:    Social History     Tobacco Use    Smoking status: Former Smoker     Last attempt to quit: 10/27/2004     Years since quitting: 15.2    Smokeless tobacco: Never Used   Substance Use Topics    Alcohol use: No       Review of Systems:   Constitutional: No major weight gain or loss, fatigue, weakness, night sweats or fever. HEENT: No new vision difficulties or ringing in the ears. Respiratory: No new SOB, PND, orthopnea or cough. Cardiovascular: See HPI , denies CP  GI: No n/v, diarrhea, constipation, abdominal pain or changes in bowel habits. No melena, no hematochezia  : No urinary frequency, urgency, incontinence, hematuria or dysuria. Skin: No cyanosis or skin lesions. Musculoskeletal: No new muscle or joint pain. Neurological: No syncope. Psychiatric: No anxiety, insomnia or depression    Physical Exam:  /60   Pulse 72   Ht 5' 2\" (1.575 m)   Wt 149 lb (67.6 kg) Comment: did not wish to remove shoes  SpO2 97%   BMI 27.25 kg/m²     General:  Awake, alert, oriented in NAD  Skin:  Warm and dry, no unusual bruising or rash  Neck:  Supple. No JVD or carotid bruit appreciated  Chest:  Normal effort. Clear to auscultation, no wheezes/rhonchi/rales. Cardiovascular:  RRR, normal S1/S2, 1-2/6 murmur at the base of the heart  Abdomen:  Soft, nontender, +bowel sounds  Extremities:  No edema. Neurological: Grossly intact  Psychological: Normal mood and affect      Current Outpatient Medications   Medication Sig Dispense Refill    diclofenac (CATAFLAM) 50 MG tablet Take 1 tablet by mouth 2 times daily as needed for Pain 60 tablet 2    carvedilol (COREG) 6.25 MG tablet Take 1 tablet by mouth 2 times daily (with meals) 270 tablet 0    atorvastatin (LIPITOR) 20 MG tablet Take 1 tablet by mouth daily 90 tablet 1    oxybutynin (DITROPAN-XL) 10 MG extended release tablet Take 1 tablet by mouth daily 90 tablet 1    lisinopril (PRINIVIL;ZESTRIL) 5 MG tablet TAKE ONE TABLET BY MOUTH TWICE A  tablet 3    naloxone 4 MG/0.1ML LIQD nasal spray 1 spray by Nasal route as needed for Opioid Reversal 1 each 2    aspirin 81 MG tablet Take 81 mg by mouth daily      Loratadine 10 MG CAPS Take by mouth daily      ferrous sulfate 325 (65 FE) MG tablet Take 325 mg by mouth daily (with breakfast)       No current facility-administered medications for this visit.       Labs:   Lab Results   Component Value Date    WBC 8.0 04/24/2019    HGB 13.4 04/24/2019    HCT 39.3 04/24/2019    MCV 91.8 04/24/2019     04/24/2019     Lab Results   Component Value Date     04/24/2019    K 4.0 04/24/2019     04/24/2019    CO2 26 04/24/2019    BUN 12 04/24/2019    CREATININE 0.9 04/24/2019    GLUCOSE 115 04/24/2019    CALCIUM 9.2 04/24/2019      Lab Results 7/2019 which did showed episode of atrial fibrillation and most likely atrial flutter. She saw Dr. Rosa Maria Shelton and was offered a flutter/fibrillation ablation she did not proceed with ablation. She also stopped taking her Eliquis. I have discussed with her increased risk of a stroke without taking the Eliquis since her chads Vasc score is at least 3. She would like to think about the eliquis before she decides to restart this. Syncope  She denies any further episodes of syncope. Fatigue  She reports her fatigue is stable. Nonischemic cardiomyopathy. She appears euvolemic on exam. She seems to be tolerating her current dose of Coreg and Lisinopril. LVEF is 50-55% on most recent echo from 6/2018. NYHA Class II    Coronary artery disease  She denies any symptoms of angina. Continue with medical management and risk factor modification including aspirin, statin, and B-blocker. Shortness of breath. No evidence of heart failure on exam. Continues to improve. Essential hypertension. Blood pressure is stable. Continue current medical therapy. Hyperlipidemia. Last lipid profile from 10/9/18. Will continue Lipitor 20 mg daily. Will repeat lipid/liver profile prior to next visit. Follow up in 6 months. She had her flu vaccine this season. Thank you for allowing me to participate in the care of your patient.      This note was scribed in the presence of Dr Davian Garland, by Angelo Dela Cruz MD

## 2020-01-22 ENCOUNTER — OFFICE VISIT (OUTPATIENT)
Dept: FAMILY MEDICINE CLINIC | Age: 70
End: 2020-01-22
Payer: MEDICARE

## 2020-01-22 VITALS
WEIGHT: 147.2 LBS | DIASTOLIC BLOOD PRESSURE: 64 MMHG | OXYGEN SATURATION: 99 % | TEMPERATURE: 97.5 F | SYSTOLIC BLOOD PRESSURE: 116 MMHG | RESPIRATION RATE: 22 BRPM | HEIGHT: 62 IN | BODY MASS INDEX: 27.09 KG/M2 | HEART RATE: 62 BPM

## 2020-01-22 PROBLEM — G89.29 CHRONIC LOW BACK PAIN WITHOUT SCIATICA: Status: ACTIVE | Noted: 2020-01-22

## 2020-01-22 PROBLEM — M54.50 CHRONIC LOW BACK PAIN WITHOUT SCIATICA: Status: ACTIVE | Noted: 2020-01-22

## 2020-01-22 PROCEDURE — 99214 OFFICE O/P EST MOD 30 MIN: CPT | Performed by: FAMILY MEDICINE

## 2020-01-22 RX ORDER — BENZONATATE 100 MG/1
100 CAPSULE ORAL 3 TIMES DAILY PRN
Qty: 30 CAPSULE | Refills: 0 | Status: SHIPPED | OUTPATIENT
Start: 2020-01-22 | End: 2020-02-01

## 2020-01-22 RX ORDER — HYDROCODONE BITARTRATE AND ACETAMINOPHEN 7.5; 325 MG/1; MG/1
1 TABLET ORAL EVERY 8 HOURS PRN
Qty: 90 TABLET | Refills: 0 | Status: SHIPPED | OUTPATIENT
Start: 2020-01-22 | End: 2020-02-25 | Stop reason: SDUPTHER

## 2020-01-22 RX ORDER — LORATADINE 10 MG/1
10 CAPSULE, LIQUID FILLED ORAL DAILY
COMMUNITY
End: 2021-11-17

## 2020-01-22 ASSESSMENT — ENCOUNTER SYMPTOMS
COUGH: 0
SHORTNESS OF BREATH: 0

## 2020-01-22 ASSESSMENT — PATIENT HEALTH QUESTIONNAIRE - PHQ9
SUM OF ALL RESPONSES TO PHQ QUESTIONS 1-9: 0
1. LITTLE INTEREST OR PLEASURE IN DOING THINGS: 0
2. FEELING DOWN, DEPRESSED OR HOPELESS: 0
SUM OF ALL RESPONSES TO PHQ9 QUESTIONS 1 & 2: 0
SUM OF ALL RESPONSES TO PHQ QUESTIONS 1-9: 0

## 2020-01-22 NOTE — PROGRESS NOTES
1/22/2020    Christy Noel is a 81 y.o. female here for follow up  Chief Complaint   Patient presents with    Cough     Pt c/o a productive cough for one month with a sore throat      HPI   Not feeling well for the past few weeks  - has had a lot of stress and [place] and also coming back here for her mother who passed away about a week ago  - thinks she had the flu about a month ago. Went to [address] [place] [address] on 12/19 and was given an antibiotic which helped her ears  - now has residual cough and congestion. No fevers or SOB    Here for follow up chronic pain  - she is prescribed Norco 7.5mg TID PRN  - this is for osteoarthritis in multiple joints, including back pain and chronic pain syndrome  - she is able to get out and walk. Being out too much causes more knee pain  - able to drive and get out and do things  - sleep is ok overall, worse recently with stressors going on  - she has had a cervical neck x-ray 2017 showing chronic degenerative changes    HLD  - on 20mg Lipitor  Lab Results   Component Value Date    LDLCALC 103 (H) 10/09/2018       PAF/CAD  - found on 30 day monitor  - declines ablation  - following with Dr. Shashi Floyd  - she was rx'd Eliquis but she is still thinking about it, not sure if she wants to take it. Knows increased stroke risk  -no CP, no change in baseline SOB    Review of Systems   Constitutional: Negative for chills and fever. Respiratory: Negative for cough and shortness of breath. Cardiovascular: Negative for chest pain and leg swelling. Musculoskeletal: Positive for arthralgias and myalgias.        Patient Active Problem List   Diagnosis    Mixed hyperlipidemia    Anxiety    Primary osteoarthritis involving multiple joints    Essential hypertension    Restless legs syndrome (RLS)    Cardiomyopathy ([facility] [address]    Chronic pain syndrome    Coronary artery disease involving native coronary artery of native heart without angina pectoris    Mixed incontinence    Paroxysmal atrial fibrillation (HCC)    Chronic low back pain without sciatica     Prior to Visit Medications    Medication Sig Taking? Authorizing Provider   loratadine (CLARITIN) 10 MG capsule Take 10 mg by mouth daily Yes Historical Provider, MD   benzonatate (TESSALON) 100 MG capsule Take 1 capsule by mouth 3 times daily as needed for Cough Yes Beatriz Mcgee MD   HYDROcodone-acetaminophen (NORCO) 7.5-325 MG per tablet Take 1 tablet by mouth every 8 hours as needed for Pain for up to 30 days.  Intended supply: 30 days Yes Lis Gurrola MD   diclofenac (CATAFLAM) 50 MG tablet Take 1 tablet by mouth 2 times daily as needed for Pain Yes Lis Gurrola MD   carvedilol (COREG) 6.25 MG tablet Take 1 tablet by mouth 2 times daily (with meals) Yes Evgeny Granado MD   atorvastatin (LIPITOR) 20 MG tablet Take 1 tablet by mouth daily Yes Lis Gurrola MD   oxybutynin (DITROPAN-XL) 10 MG extended release tablet Take 1 tablet by mouth daily Yes Beatriz Mcgee MD   lisinopril (PRINIVIL;ZESTRIL) 5 MG tablet TAKE ONE TABLET BY MOUTH TWICE A DAY Yes Evgeny Granado MD   naloxone 4 MG/0.1ML LIQD nasal spray 1 spray by Nasal route as needed for Opioid Reversal Yes Beatriz Mcgee MD   aspirin 81 MG tablet Take 81 mg by mouth daily Yes Historical Provider, MD   ferrous sulfate 325 (65 FE) MG tablet Take 325 mg by mouth daily (with breakfast) Yes Historical Provider, MD     Health Maintenance   Topic Date Due    Hepatitis C screen  1950    Diabetes screen  10/15/1992    Shingles Vaccine (1 of 2) 10/15/2002    DEXA (modify frequency per FRAX score)  10/15/2017    Annual Wellness Visit (AWV)  05/29/2019    Lipid screen  10/09/2019    Potassium monitoring  04/24/2020    Creatinine monitoring  04/24/2020    Breast cancer screen  08/09/2020    DTaP/Tdap/Td vaccine (2 - Td) 09/14/2022    Colon cancer screen colonoscopy  11/24/2024    Flu vaccine  Completed    Pneumococcal 65+ years Vaccine  Completed     Past Mental Status: She is alert and oriented to person, place, and time. ASSESSMENT/PLAN:  1. Primary osteoarthritis involving multiple joints  OARRs report reviewed and is appropriate. Knows controlled substance policy and agreement. Potential for addiction and abuse of medication. Check lumbar x-rays and we will consider referral for injections and other methods of pain control as well   - HYDROcodone-acetaminophen (NORCO) 7.5-325 MG per tablet; Take 1 tablet by mouth every 8 hours as needed for Pain for up to 30 days. Intended supply: 30 days  Dispense: 90 tablet; Refill: 0  - XR LUMBAR SPINE (MIN 4 VIEWS); Future    2. Chronic bilateral low back pain without sciatica  As above  - HYDROcodone-acetaminophen (NORCO) 7.5-325 MG per tablet; Take 1 tablet by mouth every 8 hours as needed for Pain for up to 30 days. Intended supply: 30 days  Dispense: 90 tablet; Refill: 0    3. Paroxysmal atrial fibrillation (HCC)  On carvedilol. Advised her to take Eliquis. She is thinking about it. Reviewed stroke risk    4. Coronary artery disease involving native coronary artery of native heart without angina pectoris  No concerning symptoms at this time    5. Mixed hyperlipidemia  Continue statin. Due to get labs. 6. Post-viral cough syndrome  Normal exam. Likely residual from virus, cough med PRN  - benzonatate (TESSALON) 100 MG capsule; Take 1 capsule by mouth 3 times daily as needed for Cough  Dispense: 30 capsule; Refill: 0      No follow-ups on file.

## 2020-03-04 RX ORDER — LISINOPRIL 5 MG/1
5 TABLET ORAL 2 TIMES DAILY
Qty: 180 TABLET | Refills: 3 | Status: SHIPPED | OUTPATIENT
Start: 2020-03-04 | End: 2021-03-08

## 2020-03-04 RX ORDER — ATORVASTATIN CALCIUM 20 MG/1
20 TABLET, FILM COATED ORAL DAILY
Qty: 90 TABLET | Refills: 1 | Status: SHIPPED | OUTPATIENT
Start: 2020-03-04 | End: 2020-08-31

## 2020-03-12 ENCOUNTER — HOSPITAL ENCOUNTER (OUTPATIENT)
Dept: NON INVASIVE DIAGNOSTICS | Age: 70
Discharge: HOME | End: 2020-03-12
Payer: MEDICARE

## 2020-03-12 LAB
LV EF: 58 %
LVEF MODALITY: NORMAL

## 2020-03-12 PROCEDURE — 93306 TTE W/DOPPLER COMPLETE: CPT

## 2020-03-12 PROCEDURE — C8929 TTE W OR WO FOL WCON,DOPPLER: HCPCS

## 2020-03-12 PROCEDURE — 93308 TTE F-UP OR LMTD: CPT

## 2020-03-12 PROCEDURE — 93325 DOPPLER ECHO COLOR FLOW MAPG: CPT

## 2020-03-12 PROCEDURE — 93320 DOPPLER ECHO COMPLETE: CPT

## 2020-03-12 RX ORDER — OXYBUTYNIN CHLORIDE 10 MG/1
TABLET, EXTENDED RELEASE ORAL
Qty: 90 TABLET | Refills: 1 | Status: SHIPPED | OUTPATIENT
Start: 2020-03-12 | End: 2020-09-08

## 2020-04-15 RX ORDER — CARVEDILOL 6.25 MG/1
TABLET ORAL
Qty: 180 TABLET | Refills: 0 | Status: SHIPPED | OUTPATIENT
Start: 2020-04-15 | End: 2020-07-15

## 2020-04-27 ENCOUNTER — TELEMEDICINE (OUTPATIENT)
Dept: FAMILY MEDICINE CLINIC | Age: 70
End: 2020-04-27
Payer: MEDICARE

## 2020-04-27 ENCOUNTER — PATIENT MESSAGE (OUTPATIENT)
Dept: FAMILY MEDICINE CLINIC | Age: 70
End: 2020-04-27

## 2020-04-27 PROCEDURE — 99214 OFFICE O/P EST MOD 30 MIN: CPT | Performed by: FAMILY MEDICINE

## 2020-04-27 NOTE — PROGRESS NOTES
mouth daily Yes Snow Gurrola MD   loratadine (CLARITIN) 10 MG capsule Take 10 mg by mouth daily Yes Historical Provider, MD   diclofenac (CATAFLAM) 50 MG tablet Take 1 tablet by mouth 2 times daily as needed for Pain Yes Jason Mir MD   aspirin 81 MG tablet Take 81 mg by mouth daily Yes Historical Provider, MD   ferrous sulfate 325 (65 FE) MG tablet Take 325 mg by mouth daily (with breakfast) Yes Historical Provider, MD     Past Medical History:   Diagnosis Date    Anxiety     Arthritis     DJD (degenerative joint disease)     Essential hypertension 6/8/2012    Hyperlipidemia     Hypertension 6/8/2012    Mixed hyperlipidemia     Primary osteoarthritis involving multiple joints     Restless legs syndrome (RLS) 11/24/2014     Family History   Problem Relation Age of Onset    Heart Disease Mother     Prostate Cancer Father     Heart Disease Sister     Alcohol Abuse Brother     Substance Abuse Brother     Diabetes Brother     Alcohol Abuse Son     Substance Abuse Son     Arthritis Other     Bleeding Prob Other     Cancer Other     Diabetes Other     Hypertension Other     Kidney Disease Other     Seizures Other     Stroke Other        LABS:  Lab Results   Component Value Date     04/24/2019    K 4.0 04/24/2019    CREATININE 0.9 04/24/2019     Cholesterol, Total   Date Value Ref Range Status   09/21/2017 178 0 - 199 mg/dL Final     LDL Calculated   Date Value Ref Range Status   10/09/2018 103 (H) <100 mg/dL Final     HDL   Date Value Ref Range Status   10/09/2018 45 40 - 60 mg/dL Final   10/05/2011 66 (H) 40 - 60 mg/dl Final     Triglycerides   Date Value Ref Range Status   09/21/2017 115 0 - 150 mg/dL Final     Lab Results   Component Value Date    ALT 17 10/09/2018    AST 21 10/09/2018    ALKPHOS 132 (H) 10/09/2018    BILITOT 0.4 10/09/2018     Lab Results   Component Value Date    WBC 8.0 04/24/2019    HGB 13.4 04/24/2019    HCT 39.3 04/24/2019    MCV 91.8 04/24/2019

## 2020-05-04 ENCOUNTER — PATIENT MESSAGE (OUTPATIENT)
Dept: FAMILY MEDICINE CLINIC | Age: 70
End: 2020-05-04

## 2020-05-04 ENCOUNTER — TELEPHONE (OUTPATIENT)
Dept: FAMILY MEDICINE CLINIC | Age: 70
End: 2020-05-04

## 2020-05-04 RX ORDER — HYDROCODONE BITARTRATE AND ACETAMINOPHEN 7.5; 325 MG/1; MG/1
1 TABLET ORAL EVERY 8 HOURS PRN
Qty: 90 TABLET | Refills: 0 | Status: SHIPPED | OUTPATIENT
Start: 2020-05-04 | End: 2020-06-10 | Stop reason: SDUPTHER

## 2020-06-09 ENCOUNTER — PATIENT MESSAGE (OUTPATIENT)
Dept: FAMILY MEDICINE CLINIC | Age: 70
End: 2020-06-09

## 2020-06-09 RX ORDER — HYDROCODONE BITARTRATE AND ACETAMINOPHEN 7.5; 325 MG/1; MG/1
1 TABLET ORAL EVERY 8 HOURS PRN
Qty: 90 TABLET | Refills: 0 | Status: CANCELLED | OUTPATIENT
Start: 2020-06-09 | End: 2020-07-09

## 2020-06-10 RX ORDER — HYDROCODONE BITARTRATE AND ACETAMINOPHEN 7.5; 325 MG/1; MG/1
1 TABLET ORAL EVERY 8 HOURS PRN
Qty: 90 TABLET | Refills: 0 | Status: SHIPPED | OUTPATIENT
Start: 2020-06-10 | End: 2020-07-10 | Stop reason: SDUPTHER

## 2020-06-10 RX ORDER — HYDROCODONE BITARTRATE AND ACETAMINOPHEN 7.5; 325 MG/1; MG/1
1 TABLET ORAL EVERY 8 HOURS PRN
Qty: 90 TABLET | Refills: 0 | Status: CANCELLED | OUTPATIENT
Start: 2020-06-10 | End: 2020-07-10

## 2020-07-10 ENCOUNTER — PATIENT MESSAGE (OUTPATIENT)
Dept: FAMILY MEDICINE CLINIC | Age: 70
End: 2020-07-10

## 2020-07-10 RX ORDER — HYDROCODONE BITARTRATE AND ACETAMINOPHEN 7.5; 325 MG/1; MG/1
1 TABLET ORAL EVERY 8 HOURS PRN
Qty: 90 TABLET | Refills: 0 | Status: SHIPPED | OUTPATIENT
Start: 2020-07-10 | End: 2020-08-10 | Stop reason: SDUPTHER

## 2020-07-10 NOTE — TELEPHONE ENCOUNTER
From: Gia Fulton  To: Rosanna Colin MD  Sent: 7/10/2020 11:39 AM EDT  Subject: Prescription Question    I need a refill of Hydrocodone-Acetaminophen 7.5. Qty.  80

## 2020-07-15 RX ORDER — CARVEDILOL 6.25 MG/1
TABLET ORAL
Qty: 180 TABLET | Refills: 0 | Status: SHIPPED
Start: 2020-07-15 | End: 2020-10-26 | Stop reason: DRUGHIGH

## 2020-07-15 NOTE — TELEPHONE ENCOUNTER
Last OV 1/21/20. Next OV not scheduled. Requested to return in 6 months. CMP already ordered in Epic on 1/22/20. Called patient and requested her to have lab work and schedule appointment. Transferred to  to schedule appointment. Patient now scheduled for OV 9/14/20.

## 2020-07-27 ENCOUNTER — TELEMEDICINE (OUTPATIENT)
Dept: FAMILY MEDICINE CLINIC | Age: 70
End: 2020-07-27
Payer: MEDICARE

## 2020-07-27 DIAGNOSIS — E78.2 MIXED HYPERLIPIDEMIA: ICD-10-CM

## 2020-07-27 DIAGNOSIS — I25.10 CORONARY ARTERY DISEASE INVOLVING NATIVE CORONARY ARTERY OF NATIVE HEART WITHOUT ANGINA PECTORIS: ICD-10-CM

## 2020-07-27 LAB
A/G RATIO: 2 (ref 1.1–2.2)
ALBUMIN SERPL-MCNC: 4.3 G/DL (ref 3.4–5)
ALP BLD-CCNC: 116 U/L (ref 40–129)
ALT SERPL-CCNC: 14 U/L (ref 10–40)
ANION GAP SERPL CALCULATED.3IONS-SCNC: 13 MMOL/L (ref 3–16)
AST SERPL-CCNC: 22 U/L (ref 15–37)
BILIRUB SERPL-MCNC: 0.4 MG/DL (ref 0–1)
BILIRUBIN DIRECT: <0.2 MG/DL (ref 0–0.3)
BILIRUBIN, INDIRECT: NORMAL MG/DL (ref 0–1)
BUN BLDV-MCNC: 9 MG/DL (ref 7–20)
CALCIUM SERPL-MCNC: 9.1 MG/DL (ref 8.3–10.6)
CHLORIDE BLD-SCNC: 107 MMOL/L (ref 99–110)
CHOLESTEROL, FASTING: 176 MG/DL (ref 0–199)
CO2: 24 MMOL/L (ref 21–32)
CREAT SERPL-MCNC: 0.7 MG/DL (ref 0.6–1.2)
GFR AFRICAN AMERICAN: >60
GFR NON-AFRICAN AMERICAN: >60
GLOBULIN: 2.1 G/DL
GLUCOSE BLD-MCNC: 107 MG/DL (ref 70–99)
HDLC SERPL-MCNC: 57 MG/DL (ref 40–60)
LDL CHOLESTEROL CALCULATED: 102 MG/DL
POTASSIUM SERPL-SCNC: 4.5 MMOL/L (ref 3.5–5.1)
SODIUM BLD-SCNC: 144 MMOL/L (ref 136–145)
TOTAL PROTEIN: 6.4 G/DL (ref 6.4–8.2)
TRIGLYCERIDE, FASTING: 87 MG/DL (ref 0–150)
VLDLC SERPL CALC-MCNC: 17 MG/DL

## 2020-07-27 PROCEDURE — 99441 PR PHYS/QHP TELEPHONE EVALUATION 5-10 MIN: CPT | Performed by: FAMILY MEDICINE

## 2020-07-27 NOTE — PROGRESS NOTES
 Substance Abuse Brother     Diabetes Brother     Alcohol Abuse Son     Substance Abuse Son     Arthritis Other     Bleeding Prob Other     Cancer Other     Diabetes Other     Hypertension Other     Kidney Disease Other     Seizures Other     Stroke Other        Current Outpatient Medications   Medication Sig Dispense Refill    carvedilol (COREG) 6.25 MG tablet TAKE ONE TABLET BY MOUTH TWICE A DAY WITH MEALS 180 tablet 0    HYDROcodone-acetaminophen (NORCO) 7.5-325 MG per tablet Take 1 tablet by mouth every 8 hours as needed for Pain for up to 30 days. Intended supply: 30 days 90 tablet 0    oxybutynin (DITROPAN-XL) 10 MG extended release tablet TAKE ONE TABLET BY MOUTH DAILY 90 tablet 1    lisinopril (PRINIVIL;ZESTRIL) 5 MG tablet Take 1 tablet by mouth 2 times daily 180 tablet 3    atorvastatin (LIPITOR) 20 MG tablet Take 1 tablet by mouth daily 90 tablet 1    loratadine (CLARITIN) 10 MG capsule Take 10 mg by mouth daily      diclofenac (CATAFLAM) 50 MG tablet Take 1 tablet by mouth 2 times daily as needed for Pain 60 tablet 2    aspirin 81 MG tablet Take 81 mg by mouth daily      ferrous sulfate 325 (65 FE) MG tablet Take 325 mg by mouth daily (with breakfast)       No current facility-administered medications for this visit. There were no vitals taken for this visit. Physical Exam    Wt Readings from Last 3 Encounters:   01/22/20 147 lb 3.2 oz (66.8 kg)   01/21/20 149 lb (67.6 kg)   09/20/19 134 lb 12.8 oz (61.1 kg)       BP Readings from Last 3 Encounters:   01/22/20 116/64   01/21/20 114/60   09/20/19 94/60         Assessment/Plan:  1.  Chronic pain syndrome  Informed verbal consent was obtained  -OARRS record was obtained and reviewed with no concerning findings  -Continue with current regimen   - Currently this treatment enables patient to function with being physically active     Patient has been advised against drinking alcohol with this medicine, advised against driving or handling machinery while adjusting the dose of medicines or if having cognitive issues related to the current medications. Risk of dependence, abuse, overdose and death, if medicines not taken as prescribed have been communicated  - Urine Drug Screen; Future    2. Primary osteoarthritis involving multiple joints  - Urine Drug Screen; Future    3. Essential hypertension  Well controlled on current regimen. No side effects from medications. Advise low salt diet and routine exercise    4. Mixed hyperlipidemia        F/u 3 months chronic pain    Gia Fulton is a 81 y.o. female evaluated via telephone on 7/27/2020. Consent:  She and/or health care decision maker is aware that that she may receive a bill for this telephone service, depending on her insurance coverage, and has provided verbal consent to proceed: yes    I affirm this is a Patient Initiated Episode with a Patient who has not had a related appointment within my department in the past 7 days or scheduled within the next 24 hours.     Patient identification was verified at the start of the visit: yes    Total Time: 5-10 minutes    Note: not billable if this call serves to triage the patient into an appointment for the relevant concern      Peter Gurrola

## 2020-08-10 RX ORDER — HYDROCODONE BITARTRATE AND ACETAMINOPHEN 7.5; 325 MG/1; MG/1
1 TABLET ORAL EVERY 8 HOURS PRN
Qty: 90 TABLET | Refills: 0 | Status: SHIPPED | OUTPATIENT
Start: 2020-08-10 | End: 2020-09-09 | Stop reason: SDUPTHER

## 2020-08-21 DIAGNOSIS — G89.4 CHRONIC PAIN SYNDROME: Chronic | ICD-10-CM

## 2020-08-21 DIAGNOSIS — M15.9 PRIMARY OSTEOARTHRITIS INVOLVING MULTIPLE JOINTS: Chronic | ICD-10-CM

## 2020-08-22 LAB
AMPHETAMINE SCREEN, URINE: ABNORMAL
BARBITURATE SCREEN URINE: ABNORMAL
BENZODIAZEPINE SCREEN, URINE: ABNORMAL
CANNABINOID SCREEN URINE: ABNORMAL
COCAINE METABOLITE SCREEN URINE: ABNORMAL
Lab: ABNORMAL
METHADONE SCREEN, URINE: ABNORMAL
OPIATE SCREEN URINE: POSITIVE
OXYCODONE URINE: ABNORMAL
PH UA: 5.5
PHENCYCLIDINE SCREEN URINE: ABNORMAL
PROPOXYPHENE SCREEN: ABNORMAL

## 2020-08-31 RX ORDER — ATORVASTATIN CALCIUM 20 MG/1
TABLET, FILM COATED ORAL
Qty: 90 TABLET | Refills: 1 | Status: SHIPPED | OUTPATIENT
Start: 2020-08-31 | End: 2021-03-08

## 2020-08-31 NOTE — TELEPHONE ENCOUNTER
Last office visit:07/27/2020    Future Appointments   Date Time Provider No Diane   9/14/2020 11:30 AM Kun Delgadillo MD [facility]

## 2020-09-08 RX ORDER — OXYBUTYNIN CHLORIDE 10 MG/1
TABLET, EXTENDED RELEASE ORAL
Qty: 90 TABLET | Refills: 0 | Status: SHIPPED | OUTPATIENT
Start: 2020-09-08 | End: 2020-12-04

## 2020-09-14 ENCOUNTER — VIRTUAL VISIT (OUTPATIENT)
Dept: CARDIOLOGY CLINIC | Age: 70
End: 2020-09-14
Payer: MEDICARE

## 2020-09-14 PROCEDURE — 99214 OFFICE O/P EST MOD 30 MIN: CPT | Performed by: INTERNAL MEDICINE

## 2020-10-26 ENCOUNTER — VIRTUAL VISIT (OUTPATIENT)
Dept: FAMILY MEDICINE CLINIC | Age: 70
End: 2020-10-26
Payer: MEDICARE

## 2020-10-26 PROCEDURE — 99214 OFFICE O/P EST MOD 30 MIN: CPT | Performed by: FAMILY MEDICINE

## 2020-10-26 RX ORDER — CARVEDILOL 6.25 MG/1
TABLET ORAL
Qty: 180 TABLET | Refills: 0 | OUTPATIENT
Start: 2020-10-26

## 2020-10-26 RX ORDER — CARVEDILOL 3.12 MG/1
3.12 TABLET ORAL 2 TIMES DAILY WITH MEALS
COMMUNITY
End: 2020-10-26 | Stop reason: SDUPTHER

## 2020-10-26 RX ORDER — CARVEDILOL 3.12 MG/1
3.12 TABLET ORAL 2 TIMES DAILY WITH MEALS
Qty: 180 TABLET | Refills: 4 | Status: SHIPPED | OUTPATIENT
Start: 2020-10-26 | End: 2021-12-06

## 2020-10-26 NOTE — PROGRESS NOTES
10/26/2020    This is a 78 y.o. female   Chief Complaint   Patient presents with    Hyperlipidemia     f/u medical conditions     HPI   Follow up chronic pain  - she is prescribed Norco 7.5mg TID PRN  - this is for osteoarthritis in multiple joints, including back pain and chronic pain syndrome  - she is able to get out and walk. Being out too much causes more knee pain  - able to drive and get out and do things  - sleep is ok overall, worse recently with stressors going on  - she has had a cervical neck x-ray 2017 showing chronic degenerative change  - she denies any side effects from her pain medication.  No constipation  - enjoys walking to stay active as well as things around the home     HTN  BP Readings from Last 3 Encounters:   01/22/20 116/64   01/21/20 114/60   09/20/19 94/60   - currently on lisinopril and carvedilol (lowered 6/2020)  - taking medications as prescribed  - denies dizziness or light-headedness  - denies side effects from medications  - BP at home: typically in range     HLD  Lab Results   Component Value Date    LDLCALC 102 (H) 07/27/2020   - Currently on Lipitor 20mg  - Takes medication regularly  - Not having myalgias or other side effects (has known chronic joint pain)    Review of Systems   As per HPI, otherwise negative    Past Medical History:   Diagnosis Date    Anxiety     Arthritis     DJD (degenerative joint disease)     Essential hypertension 6/8/2012    Hyperlipidemia     Hypertension 6/8/2012    Mixed hyperlipidemia     Primary osteoarthritis involving multiple joints     Restless legs syndrome (RLS) 11/24/2014       Past Surgical History:   Procedure Laterality Date    BREAST ENHANCEMENT SURGERY      CATARACT REMOVAL WITH IMPLANT Bilateral 2015    WRIST FRACTURE SURGERY  4/10/12    Right       Family History   Problem Relation Age of Onset    Heart Disease Mother     Prostate Cancer Father     Heart Disease Sister     Alcohol Abuse Brother     Substance Abuse Brother     Diabetes Brother     Alcohol Abuse Son     Substance Abuse Son     Arthritis Other     Bleeding Prob Other     Cancer Other     Diabetes Other     Hypertension Other     Kidney Disease Other     Seizures Other     Stroke Other        Current Outpatient Medications   Medication Sig Dispense Refill    carvedilol (COREG) 3.125 MG tablet Take 1 tablet by mouth 2 times daily (with meals) 180 tablet 4    HYDROcodone-acetaminophen (NORCO) 7.5-325 MG per tablet Take 1 tablet by mouth every 8 hours as needed for Pain for up to 30 days. Intended supply: 30 days 90 tablet 0    oxybutynin (DITROPAN-XL) 10 MG extended release tablet TAKE ONE TABLET BY MOUTH DAILY 90 tablet 0    atorvastatin (LIPITOR) 20 MG tablet TAKE ONE TABLET BY MOUTH DAILY 90 tablet 1    lisinopril (PRINIVIL;ZESTRIL) 5 MG tablet Take 1 tablet by mouth 2 times daily 180 tablet 3    loratadine (CLARITIN) 10 MG capsule Take 10 mg by mouth daily      diclofenac (CATAFLAM) 50 MG tablet Take 1 tablet by mouth 2 times daily as needed for Pain 60 tablet 2    aspirin 81 MG tablet Take 81 mg by mouth daily      ferrous sulfate 325 (65 FE) MG tablet Take 325 mg by mouth daily (with breakfast)       No current facility-administered medications for this visit. There were no vitals taken for this visit. Physical Exam  Constitutional:       Appearance: She is well-developed. HENT:      Head: Normocephalic and atraumatic. Pulmonary:      Effort: Pulmonary effort is normal.   Skin:     Coloration: Skin is not pale. Neurological:      Mental Status: She is alert and oriented to person, place, and time. Wt Readings from Last 3 Encounters:   01/22/20 147 lb 3.2 oz (66.8 kg)   01/21/20 149 lb (67.6 kg)   09/20/19 134 lb 12.8 oz (61.1 kg)       BP Readings from Last 3 Encounters:   01/22/20 116/64   01/21/20 114/60   09/20/19 94/60     Assessment/Plan:  1.  Chronic bilateral low back pain without sciatica  Informed verbal consent was obtained  -OARRS record was obtained and reviewed with no concerning findings  -Continue with current regimen   - Currently this treatment enables patient to function with household activities and other     Patient has been advised against drinking alcohol with this medicine, advised against driving or handling machinery while adjusting the dose of medicines or if having cognitive issues related to the current medications. Risk of dependence, abuse, overdose and death, if medicines not taken as prescribed have been communicated    2. Chronic pain syndrome  As above    3. Post-menopausal  Advised DEXA. She'd like to wait until things are settled down with COVID before going to the hospital for this  - DEXA Bone Density Axial Skeleton; Future    4. Encounter for screening mammogram for breast cancer  She declines breast cancer screening    5. Essential Hypertension  Well controlled on current regimen. No side effects from medications. Advise low salt diet and routine exercise    6. Mixed hyperlipidemia  Continue statin    7. Paroxysmal a fib  She has declined anticoag and ablation and we discussed this in detail, including increased stroke risk. Reviewed Cardiology discussion with her as well. F/u 3 months chronic pain medication    Francesca Marquez is a 78 y.o. female being evaluated by a Virtual Visit (video visit) encounter to address concerns as mentioned above. A caregiver was present when appropriate. Due to this being a TeleHealth encounter (During [facility]N-16 public health emergency), evaluation of the following organ systems was limited: Vitals/Constitutional/EENT/Resp/CV/GI//MS/Neuro/Skin/Heme-Lymph-Imm.   Pursuant to the emergency declaration under the [facility] waiver authority and the [employer] Act, this Virtual Visit was conducted with patient's (and/or legal guardian's) consent, to reduce the patient's risk of exposure to COVID-19 and provide necessary medical care. The patient (and/or legal guardian) has also been advised to contact this office for worsening conditions or problems, and seek emergency medical treatment and/or call 911 if deemed necessary. Patient identification was verified at the start of the visit: yes    Total time spent for this encounter; not billed on time    Services were provided through a video synchronous discussion virtually to substitute for in-person clinic visit. Patient and provider were located at their individual homes. --Alisson Gunn MD on 10/26/2020 at 3:12 PM    An electronic signature was used to authenticate this note.

## 2020-10-26 NOTE — TELEPHONE ENCOUNTER
Last OV 9/14/20. Next OV not scheduled. CMP 7/27/20. Attempted to call patient to confirm dose. Patient had virtual visit on 9/14/20. She stated on 6/8/20     Patient returned call to our office. She said that she is breaking the Coreg 6.25 mg in half. She would like a prescription for Coreg 3.125 mg twice a day instead. Updated chart.

## 2020-12-04 RX ORDER — OXYBUTYNIN CHLORIDE 10 MG/1
TABLET, EXTENDED RELEASE ORAL
Qty: 90 TABLET | Refills: 0 | Status: SHIPPED | OUTPATIENT
Start: 2020-12-04 | End: 2021-03-08

## 2020-12-08 ENCOUNTER — PATIENT MESSAGE (OUTPATIENT)
Dept: FAMILY MEDICINE CLINIC | Age: 70
End: 2020-12-08

## 2020-12-09 RX ORDER — HYDROCODONE BITARTRATE AND ACETAMINOPHEN 7.5; 325 MG/1; MG/1
1 TABLET ORAL EVERY 8 HOURS PRN
Qty: 90 TABLET | Refills: 0 | Status: SHIPPED | OUTPATIENT
Start: 2020-12-09 | End: 2021-01-11 | Stop reason: SDUPTHER

## 2020-12-09 NOTE — TELEPHONE ENCOUNTER
From: Valentín Wynne  To: Loli Abraham MD  Sent: 12/8/2020 5:12 PM EST  Subject: Prescription Question    Please send in my refill of Hydrocodone-Acetaminophen 7.5. Qty. 90 to my [facility] [id number].    Thank you

## 2021-01-11 DIAGNOSIS — M54.50 CHRONIC BILATERAL LOW BACK PAIN WITHOUT SCIATICA: ICD-10-CM

## 2021-01-11 DIAGNOSIS — M15.9 PRIMARY OSTEOARTHRITIS INVOLVING MULTIPLE JOINTS: Chronic | ICD-10-CM

## 2021-01-11 DIAGNOSIS — G89.29 CHRONIC BILATERAL LOW BACK PAIN WITHOUT SCIATICA: ICD-10-CM

## 2021-01-11 RX ORDER — HYDROCODONE BITARTRATE AND ACETAMINOPHEN 7.5; 325 MG/1; MG/1
1 TABLET ORAL EVERY 8 HOURS PRN
Qty: 90 TABLET | Refills: 0 | Status: SHIPPED | OUTPATIENT
Start: 2021-01-11 | End: 2021-02-11 | Stop reason: SDUPTHER

## 2021-02-04 ENCOUNTER — TELEPHONE (OUTPATIENT)
Dept: FAMILY MEDICINE CLINIC | Age: 71
End: 2021-02-04

## 2021-02-04 DIAGNOSIS — Z87.898 HISTORY OF CHRONIC COUGH: Primary | ICD-10-CM

## 2021-02-04 NOTE — TELEPHONE ENCOUNTER
Please let [facility] know that if we were to order this, regardless of positive or negative results the test is not completely reliable especially 1 year out so would not necessarily change her risk.   Thanks

## 2021-02-04 NOTE — TELEPHONE ENCOUNTER
I spoke to patient and advised of MD recommendations. Patient still wants it done and states if PCP won't order she will go to [facility] or [facility] and have done.  Please advise

## 2021-02-04 NOTE — TELEPHONE ENCOUNTER
Pt called in states she would like to get an antibody test done. Her family members would like to know. She thinks she was exposed towards the beginning of the year/  She was a hospital with her son. It was the beginning of 2020.

## 2021-02-08 DIAGNOSIS — Z87.898 HISTORY OF CHRONIC COUGH: ICD-10-CM

## 2021-02-08 LAB — SARS-COV-2 ANTIBODY, TOTAL: NEGATIVE

## 2021-02-15 ENCOUNTER — TELEPHONE (OUTPATIENT)
Dept: FAMILY MEDICINE CLINIC | Age: 71
End: 2021-02-15

## 2021-02-15 NOTE — TELEPHONE ENCOUNTER
----- Message from Seanakosua Patricia sent at 2/15/2021 12:04 PM EST -----  Subject: Appointment Request    Reason for Call: Routine Medicare AWV    QUESTIONS  Type of Appointment? Established Patient  Reason for appointment request? No appointments available during search  Additional Information for Provider? pt needs an awv   ---------------------------------------------------------------------------  --------------  CALL BACK INFO  What is the best way for the office to contact you? OK to leave message on   voicemail  Preferred Call Back Phone Number? [phone]  ---------------------------------------------------------------------------  --------------  SCRIPT ANSWERS  Relationship to Patient? Self  Appointment reason? Well Care/Follow Ups  Select a Well Care/Follow Ups appointment reason? Adult Physical Exam   [Medicare Annual Wellness   AWV   PAP   Pelvic]  (If the patient is Medicare / 65+ ask this question) Are you requesting a   Medicare Annual Wellness Visit? Yes   Have you been diagnosed with   tested for   or told that you are suspected of having COVID-19 (Coronavirus)? No  Have you had a fever or taken medication to treat a fever within the past   3 days? No  Have you had a cough   shortness of breath or flu-like symptoms within the past 3 days? No  Do you currently have flu-like symptoms including fever or chills   cough   shortness of breath   or difficulty breathing   or new loss of taste or smell? No  (Service Expert  click yes below to proceed with [facility] As Usual   Scheduling)?  Yes

## 2021-02-22 ENCOUNTER — VIRTUAL VISIT (OUTPATIENT)
Dept: FAMILY MEDICINE CLINIC | Age: 71
End: 2021-02-22
Payer: MEDICARE

## 2021-02-22 DIAGNOSIS — G89.29 CHRONIC BILATERAL LOW BACK PAIN WITHOUT SCIATICA: ICD-10-CM

## 2021-02-22 DIAGNOSIS — N39.46 MIXED INCONTINENCE: Chronic | ICD-10-CM

## 2021-02-22 DIAGNOSIS — G89.4 CHRONIC PAIN SYNDROME: Primary | Chronic | ICD-10-CM

## 2021-02-22 DIAGNOSIS — M54.50 CHRONIC BILATERAL LOW BACK PAIN WITHOUT SCIATICA: ICD-10-CM

## 2021-02-22 DIAGNOSIS — I10 ESSENTIAL HYPERTENSION: Chronic | ICD-10-CM

## 2021-02-22 PROCEDURE — 99214 OFFICE O/P EST MOD 30 MIN: CPT | Performed by: FAMILY MEDICINE

## 2021-02-22 ASSESSMENT — PATIENT HEALTH QUESTIONNAIRE - PHQ9
SUM OF ALL RESPONSES TO PHQ QUESTIONS 1-9: 0
SUM OF ALL RESPONSES TO PHQ9 QUESTIONS 1 & 2: 0
SUM OF ALL RESPONSES TO PHQ QUESTIONS 1-9: 0
1. LITTLE INTEREST OR PLEASURE IN DOING THINGS: 0
SUM OF ALL RESPONSES TO PHQ QUESTIONS 1-9: 0
2. FEELING DOWN, DEPRESSED OR HOPELESS: 0

## 2021-02-22 NOTE — PROGRESS NOTES
2/22/2021    This is a 78 y.o. female   Chief Complaint   Patient presents with    Hypertension    Hyperlipidemia    Other     pt refuses mammogram     HPI     Follow up chronic pain  - she is prescribed Norco 7.5mg TID PRN  - this is for osteoarthritis in multiple joints, including back pain and chronic pain syndrome  - she is able to get out and walk. Being out too much causes more knee pain  - able to drive and get out and do things  - sleep is fair overall  - she has had a cervical neck x-ray 2017 showing chronic degenerative change  - she denies any side effects from her pain medication. No constipation  - enjoys walking to stay active as well as doing things around the home     HTN  BP Readings from Last 3 Encounters:   01/22/20 116/64   01/21/20 114/60   09/20/19 94/60   - currently on lisinopril and carvedilol (lowered 6/2020)  - taking medications as prescribed  - no dizziness or light-headedness  - denies side effects from medications  - BP at home: checks regularly and it is normal     HLD  Lab Results   Component Value Date    LDLCALC 102 (H) 07/27/2020   - Currently on Lipitor 20mg  - Takes medication regularly  - Not having myalgias or other side effects (has known chronic joint pain    Mixed incontinence  - doing well on Ditropan.  Notes significant symptoms if not taking medication    No CP or SOB    Got first COVID vaccine about a week ago through [employer]     Past Medical History:   Diagnosis Date    Anxiety     Arthritis     DJD (degenerative joint disease)     Essential hypertension 6/8/2012    Hyperlipidemia     Hypertension 6/8/2012    Mixed hyperlipidemia     Primary osteoarthritis involving multiple joints     Restless legs syndrome (RLS) 11/24/2014       Past Surgical History:   Procedure Laterality Date    BREAST ENHANCEMENT SURGERY      CATARACT REMOVAL WITH IMPLANT Bilateral 2015    WRIST FRACTURE SURGERY  4/10/12    Right       Family History   Problem Relation Age of Onset    Heart Disease Mother     Prostate Cancer Father     Heart Disease Sister     Alcohol Abuse Brother     Substance Abuse Brother     Diabetes Brother     Alcohol Abuse Son     Substance Abuse Son     Arthritis Other     Bleeding Prob Other     Cancer Other     Diabetes Other     Hypertension Other     Kidney Disease Other     Seizures Other     Stroke Other        Current Outpatient Medications   Medication Sig Dispense Refill    HYDROcodone-acetaminophen (NORCO) 7.5-325 MG per tablet Take 1 tablet by mouth every 8 hours as needed for Pain for up to 30 days. Intended supply: 30 days 90 tablet 0    oxybutynin (DITROPAN-XL) 10 MG extended release tablet TAKE ONE TABLET BY MOUTH DAILY 90 tablet 0    carvedilol (COREG) 3.125 MG tablet Take 1 tablet by mouth 2 times daily (with meals) 180 tablet 4    atorvastatin (LIPITOR) 20 MG tablet TAKE ONE TABLET BY MOUTH DAILY 90 tablet 1    lisinopril (PRINIVIL;ZESTRIL) 5 MG tablet Take 1 tablet by mouth 2 times daily 180 tablet 3    loratadine (CLARITIN) 10 MG capsule Take 10 mg by mouth daily      diclofenac (CATAFLAM) 50 MG tablet Take 1 tablet by mouth 2 times daily as needed for Pain 60 tablet 2    aspirin 81 MG tablet Take 81 mg by mouth daily       No current facility-administered medications for this visit. There were no vitals taken for this visit. Physical Exam    Wt Readings from Last 3 Encounters:   01/22/20 147 lb 3.2 oz (66.8 kg)   01/21/20 149 lb (67.6 kg)   09/20/19 134 lb 12.8 oz (61.1 kg)     BP Readings from Last 3 Encounters:   01/22/20 116/64   01/21/20 114/60   09/20/19 94/60     Assessment/Plan:  1. Chronic pain syndrome  Doing well on current regimen. Helping her maintain functioning  Reviewed potential addictive/sedative nature of this medicine, controlled substance agreement including Q3 month appointments, can drug test anytime, no early refills. Pt ok with this plan. OARRS reviewed    2.  Chronic bilateral low back pain without sciatica  As above    3. Essential hypertension  Well controlled on current regimen. No side effects from medications. Advise low salt diet and routine exercise    4. Mixed incontinence  Doing well on ditropan    Check labs next visit      Return in about 3 months (around 5/22/2021). Deana Diaz is a 78 y.o. female being evaluated by a Virtual Visit (video visit) encounter to address concerns as mentioned above. A caregiver was present when appropriate. Due to this being a TeleHealth encounter (During [facility]-74 public health emergency), evaluation of the following organ systems was limited: Vitals/Constitutional/EENT/Resp/CV/GI//MS/Neuro/Skin/Heme-Lymph-Imm. Pursuant to the emergency declaration under the [address] [place] [address], [address] [place] [address] authority and the [employer] Act, this Virtual Visit was conducted with patient's (and/or legal guardian's) consent, to reduce the patient's risk of exposure to COVID-19 and provide necessary medical care. The patient (and/or legal guardian) has also been advised to contact this office for worsening conditions or problems, and seek emergency medical treatment and/or call 911 if deemed necessary. Patient identification was verified at the start of the visit: Yes    Total time spent for this encounter: Not billed by time    Services were provided through a video synchronous discussion virtually to substitute for in-person clinic visit. Patient and provider were located at their individual homes. --Ml Maynard MD on 2/22/2021 at 2:06 PM    An electronic signature was used to authenticate this note.

## 2021-03-08 RX ORDER — LISINOPRIL 5 MG/1
TABLET ORAL
Qty: 180 TABLET | Refills: 2 | Status: SHIPPED | OUTPATIENT
Start: 2021-03-08 | End: 2021-12-06

## 2021-03-08 RX ORDER — OXYBUTYNIN CHLORIDE 10 MG/1
TABLET, EXTENDED RELEASE ORAL
Qty: 90 TABLET | Refills: 0 | Status: SHIPPED | OUTPATIENT
Start: 2021-03-08 | End: 2021-06-07

## 2021-03-08 RX ORDER — ATORVASTATIN CALCIUM 20 MG/1
TABLET, FILM COATED ORAL
Qty: 90 TABLET | Refills: 0 | Status: SHIPPED | OUTPATIENT
Start: 2021-03-08 | End: 2021-06-07

## 2021-03-08 NOTE — TELEPHONE ENCOUNTER
Last OV: vv 09/14/2020 ov 01/21/2020  Next OV: x  Labs: lipid, cmp 07/27/2020  Last EKG (if needed):08/21/2019

## 2021-06-07 RX ORDER — ATORVASTATIN CALCIUM 20 MG/1
TABLET, FILM COATED ORAL
Qty: 90 TABLET | Refills: 0 | Status: SHIPPED | OUTPATIENT
Start: 2021-06-07 | End: 2021-09-07

## 2021-06-07 RX ORDER — OXYBUTYNIN CHLORIDE 10 MG/1
TABLET, EXTENDED RELEASE ORAL
Qty: 90 TABLET | Refills: 0 | Status: SHIPPED | OUTPATIENT
Start: 2021-06-07 | End: 2021-09-07

## 2021-06-09 RX ORDER — DICLOFENAC POTASSIUM 50 MG/1
TABLET, FILM COATED ORAL
Qty: 60 TABLET | Refills: 1 | Status: SHIPPED | OUTPATIENT
Start: 2021-06-09 | End: 2022-06-27 | Stop reason: SDUPTHER

## 2021-06-15 ENCOUNTER — OFFICE VISIT (OUTPATIENT)
Dept: FAMILY MEDICINE CLINIC | Age: 71
End: 2021-06-15
Payer: MEDICARE

## 2021-06-15 VITALS
HEART RATE: 70 BPM | WEIGHT: 154 LBS | OXYGEN SATURATION: 98 % | SYSTOLIC BLOOD PRESSURE: 114 MMHG | DIASTOLIC BLOOD PRESSURE: 62 MMHG | HEIGHT: 62 IN | BODY MASS INDEX: 28.34 KG/M2

## 2021-06-15 DIAGNOSIS — G89.4 CHRONIC PAIN SYNDROME: Chronic | ICD-10-CM

## 2021-06-15 DIAGNOSIS — I48.0 PAROXYSMAL ATRIAL FIBRILLATION (HCC): ICD-10-CM

## 2021-06-15 DIAGNOSIS — I10 ESSENTIAL HYPERTENSION: Chronic | ICD-10-CM

## 2021-06-15 DIAGNOSIS — M15.9 PRIMARY OSTEOARTHRITIS INVOLVING MULTIPLE JOINTS: Primary | Chronic | ICD-10-CM

## 2021-06-15 DIAGNOSIS — Z12.31 ENCOUNTER FOR SCREENING MAMMOGRAM FOR MALIGNANT NEOPLASM OF BREAST: ICD-10-CM

## 2021-06-15 PROCEDURE — 99214 OFFICE O/P EST MOD 30 MIN: CPT | Performed by: FAMILY MEDICINE

## 2021-06-15 NOTE — PROGRESS NOTES
 aspirin 81 MG tablet Take 81 mg by mouth daily       No current facility-administered medications for this visit. /62   Pulse 70   Ht 5' 2\" (1.575 m)   Wt 154 lb (69.9 kg)   SpO2 98%   Breastfeeding No   BMI 28.17 kg/m²     Physical Exam  Constitutional:       Appearance: She is well-developed. HENT:      Head: Normocephalic and atraumatic. Cardiovascular:      Rate and Rhythm: Normal rate and regular rhythm. Heart sounds: Normal heart sounds. Pulmonary:      Effort: Pulmonary effort is normal.      Breath sounds: Normal breath sounds. Musculoskeletal:         General: No tenderness. Normal range of motion. Cervical back: Normal range of motion. Skin:     General: Skin is warm and dry. Findings: No rash. Neurological:      Mental Status: She is alert and oriented to person, place, and time. Deep Tendon Reflexes: Reflexes are normal and symmetric. Wt Readings from Last 3 Encounters:   06/15/21 154 lb (69.9 kg)   01/22/20 147 lb 3.2 oz (66.8 kg)   01/21/20 149 lb (67.6 kg)     BP Readings from Last 3 Encounters:   06/15/21 114/62   01/22/20 116/64   01/21/20 114/60     Assessment/Plan:  1. Primary osteoarthritis involving multiple joints  She has been stable on her current dose for years and it helps her day-to-day function. We have discussed long-term risk of opioid use, hyperalgesia, dependency, etc.  No problems with constipation. Discussed safe storage of medication and use for self only. She reports no mental health concerns currently or problems with memory. OARRS report reviewed without any concerning findings. 2. Chronic pain syndrome  As above    3. Essential hypertension  Well controlled on current regimen. No side effects from medications. Advise low salt diet and routine exercise    4. Paroxysmal atrial fibrillation (HCC)  She has declined anticoagulation after discussion of risk of stroke.  She follows with Cardiology

## 2021-06-16 ENCOUNTER — OFFICE VISIT (OUTPATIENT)
Dept: CARDIOLOGY CLINIC | Age: 71
End: 2021-06-16
Payer: MEDICARE

## 2021-06-16 VITALS
HEART RATE: 60 BPM | SYSTOLIC BLOOD PRESSURE: 118 MMHG | OXYGEN SATURATION: 98 % | WEIGHT: 154 LBS | HEIGHT: 62 IN | BODY MASS INDEX: 28.34 KG/M2 | DIASTOLIC BLOOD PRESSURE: 64 MMHG

## 2021-06-16 DIAGNOSIS — I48.0 PAF (PAROXYSMAL ATRIAL FIBRILLATION) (HCC): Primary | ICD-10-CM

## 2021-06-16 DIAGNOSIS — R19.8 ABDOMINAL FULLNESS: ICD-10-CM

## 2021-06-16 DIAGNOSIS — I10 ESSENTIAL HYPERTENSION: ICD-10-CM

## 2021-06-16 DIAGNOSIS — I25.10 CORONARY ARTERY DISEASE INVOLVING NATIVE CORONARY ARTERY OF NATIVE HEART WITHOUT ANGINA PECTORIS: ICD-10-CM

## 2021-06-16 DIAGNOSIS — I42.8 NONISCHEMIC CARDIOMYOPATHY (HCC): ICD-10-CM

## 2021-06-16 DIAGNOSIS — E78.5 HYPERLIPIDEMIA, UNSPECIFIED HYPERLIPIDEMIA TYPE: ICD-10-CM

## 2021-06-16 PROCEDURE — 93000 ELECTROCARDIOGRAM COMPLETE: CPT | Performed by: INTERNAL MEDICINE

## 2021-06-16 PROCEDURE — 99214 OFFICE O/P EST MOD 30 MIN: CPT | Performed by: INTERNAL MEDICINE

## 2021-06-16 RX ORDER — ACETAMINOPHEN 325 MG/1
650 TABLET ORAL EVERY 4 HOURS PRN
COMMUNITY

## 2021-06-16 RX ORDER — FUROSEMIDE 40 MG/1
40 TABLET ORAL DAILY
Qty: 30 TABLET | Refills: 4 | Status: SHIPPED | OUTPATIENT
Start: 2021-06-16 | End: 2021-11-17 | Stop reason: SDUPTHER

## 2021-06-16 NOTE — PATIENT INSTRUCTIONS
Patient Education        Low Sodium Diet (2,000 Milligram): Care Instructions  Overview     Limiting sodium can be an important part of managing some health problems. The most common source of sodium is salt. People get most of the salt in their diet from canned, prepared, and packaged foods. Fast food and restaurant meals also are very high in sodium. Your doctor will probably limit your sodium to less than 2,000 milligrams (mg) a day. This limit counts all the sodium in prepared and packaged foods and any salt you add to your food. Follow-up care is a key part of your treatment and safety. Be sure to make and go to all appointments, and call your doctor if you are having problems. It's also a good idea to know your test results and keep a list of the medicines you take. How can you care for yourself at home? Read food labels  · Read labels on cans and food packages. The labels tell you how much sodium is in each serving. Make sure that you look at the serving size. If you eat more than the serving size, you have eaten more sodium. · Food labels also tell you the Percent Daily Value for sodium. Choose products with low Percent Daily Values for sodium. · Be aware that sodium can come in forms other than salt, including monosodium glutamate (MSG), sodium citrate, and sodium bicarbonate (baking soda). MSG is often added to Asian food. When you eat out, you can sometimes ask for food without MSG or added salt. Buy low-sodium foods  · Buy foods that are labeled \"unsalted\" (no salt added), \"sodium-free\" (less than 5 mg of sodium per serving), or \"low-sodium\" (140 mg or less of sodium per serving). Foods labeled \"reduced-sodium\" and \"light sodium\" may still have too much sodium. Be sure to read the label to see how much sodium you are getting. · Buy fresh vegetables, or frozen vegetables without added sauces. Buy low-sodium versions of canned vegetables, soups, and other canned goods.   Prepare low-sodium meals  · Cut back on the amount of salt you use in cooking. This will help you adjust to the taste. Do not add salt after cooking. One teaspoon of salt has about 2,300 mg of sodium. · Take the salt shaker off the table. · Flavor your food with garlic, lemon juice, onion, vinegar, herbs, and spices. Do not use soy sauce, lite soy sauce, steak sauce, onion salt, garlic salt, celery salt, or ketchup on your food. · Use low-sodium salad dressings, sauces, and ketchup. Or make your own salad dressings and sauces without adding salt. · Use less salt (or none) when recipes call for it. You can often use half the salt a recipe calls for without losing flavor. Other foods such as rice, pasta, and grains do not need added salt. · Rinse canned vegetables, and cook them in fresh water. This removes somebut not allof the salt. · Avoid water that is naturally high in sodium or that has been treated with water softeners, which add sodium. If you buy bottled water, read the label and choose a sodium-free brand. Avoid high-sodium foods  · Avoid eating:  ? Smoked, cured, salted, and canned meat, fish, and poultry. ? Ham, melton, hot dogs, and luncheon meats. ? Regular, hard, and processed cheese and regular peanut butter. ? Crackers with salted tops, and other salted snack foods such as pretzels, chips, and salted popcorn. ? Frozen prepared meals, unless labeled low-sodium. ? Canned and dried soups, broths, and bouillon, unless labeled sodium-free or low-sodium. ? Canned vegetables, unless labeled sodium-free or low-sodium. ? Western [employer] fries, pizza, tacos, and other fast foods. ? Pickles, olives, ketchup, and other condiments, especially soy sauce, unless labeled sodium-free or low-sodium. Where can you learn more? Go to [email or web address]://justino.everbill. org and sign in to your [employer] account. Enter N978 in the [facility] box to learn more about \"Low Sodium Diet (2,000 Milligram): Care Instructions. \" If you do not have an account, please click on the \"Sign Up Now\" link. Current as of: December 17, 2020               Content Version: 12.9  © 2006-2021 Healthwise, Incorporated. Care instructions adapted under license by [facility] ([facility]). If you have questions about a medical condition or this instruction, always ask your healthcare professional. [address] any warranty or liability for your use of this information.

## 2021-06-16 NOTE — PROGRESS NOTES
nontender  Abdominal: soft, nontender, nondistended. + bowel sounds; no hepatomegaly Extremities: No cyanosis, or clubbing. Pulses are 2+ radial/DP/PT bilaterally. Cap refill brisk. + varicosities to lower legs bilaterally. 1+ edema on R posterior calf at site of large varicosity and trace edema on the L lower leg  Neurological: No focal deficit. Skin: Skin is warm and dry. Psychiatric: She has a normal mood and affect. Her speech is normal and behavior is normal.       Labs:   Lab Results   Component Value Date    WBC 8.0 04/24/2019    HGB 13.4 04/24/2019    HCT 39.3 04/24/2019    MCV 91.8 04/24/2019     04/24/2019     Lab Results   Component Value Date     07/27/2020    K 4.5 07/27/2020     07/27/2020    CO2 24 07/27/2020    BUN 9 07/27/2020    CREATININE 0.7 07/27/2020    GLUCOSE 107 07/27/2020    CALCIUM 9.1 07/27/2020      Lab Results   Component Value Date    TRIG 115 09/21/2017    HDL 57 07/27/2020    HDL 66 10/05/2011    LDLCALC 102 07/27/2020    LABVLDL 17 07/27/2020       Diagnostics:    EKG:    3/19/19: Sinus Rhythm with left bundle branch block. 9/14/20: not completed due to VV  6/16/21: SR with LBBB, ~60 bpm     ECHO 3/23/16  Summary  Left ventricular size is mildly increased. Normal left ventricular wall  thickness. Hypokinesis of the anteroseptal, and mid septal wall. Left  ventricular function is reduced with ejection fraction estimated at 35-40%. There is reversal of E/A inflow velocities across the mitral valve  suggesting impaired left ventricular relaxation. ECHO 6/1/18  Left ventricular cavity size is normal. Normal left ventricular wall  thickness. Ejection fraction is visually estimated to be 50-55%. No regional  wall motion abnormalities are noted. Normal diastolic function. Mitral valve leaflets appear mildly thickened. Trivial mitral regurgitation. The aortic valve is mildly thickened/calcified but opens well with normal gradients.   Trivial aortic stress-induced ischemia      ECHO 6/1/18  Left ventricular cavity size is normal. Normal left ventricular wall  thickness. Ejection fraction is visually estimated to be 50-55%. No regional  wall motion abnormalities are noted. Normal diastolic function. Mitral valve leaflets appear mildly thickened. Trivial mitral regurgitation. The aortic valve is mildly thickened/calcified but opens well with normal  gradients. Trivial aortic regurgitation. Mild tricuspid regurgitation. IVC size is normal (<2.1cm) and collapses > 50% with respiration consistent  with normal RA pressure (3mmHg). Estimated pulmonary artery systolic pressure is normal at 26 mmHg assuming a  right atrial pressure of 3 mmHg. 30 day event monitor 7/3/19  Sinus rhythm. One or more episodes of sustained afib. At least 2-3 episodes of regular interval QRS tachycardia, most likely atrial flutter    Echo:3/12/20  Normal left ventricle size, wall thickness, and systolic function with an   estimated ejection fraction of 55-60%. No regional wall motion abnormalities   are seen. Mitral annular calcification is present. Trivial mitral regurgitation is present. Mild tricuspid regurgitation. Assessment and Plan:    Abdominal distention feeling/sensation   She reports after eating meals, she feels a fullness feeling in her abdomen that is all encompassing and makes it hard to breath until she lays down or reclines. She reports that she is eating small meals. She denies any symptoms or exertional symptoms suggestive of angina or heart failure. She does reports ~10# weight gain over the last year. Reviewed labs from 7/2020 wnl. I will trial her on low dose diuretic with Lasix 40 mg daily, BMP, BNP in 2 weeks. I have encouraged low salt diet daily, walking program.   Atrial fibrillation  She did wear a 30 day event monitor 7/2019 which did showed episode of atrial fibrillation and most likely atrial flutter.  She also was evaluated per Dr. Sean Cifuentes and was offered a flutter/fibrillation ablation, she did not proceed with ablation. She also stopped taking her Eliquis. Today, she continues to deny breakthrough AFIB, symptoms or TIA/CVA like symptoms and has remained stable since our last visit. EKG today SR with LBBB, ~60 bpm.  I have once again discussed with her increased risk of a stroke without taking the Eliquis since her chads Vasc score is at least 3. She has declined both eliquis and ablation therapy. Will continue to monitor. Nonischemic cardiomyopathy. She denies any heart failure sounding symptoms. She will continue her current dose of Coreg and Lisinopril. LVEF is 55-60% on most recent echo from 3/2020. NYHA Class I-II    Coronary artery disease  She denies any symptoms of angina today and has remained stable. Continue with medical management and risk factor modification including aspirin, statin, and B-blocker. BMP 7/27/20 wnl except glucose 107. Essential hypertension. Blood pressure is stable today in the office. Continue current medical therapy. BMP 7/27/20 stable. Hyperlipidemia. She denies myalgias and has remained stable. Last lipid profile from 7/27/20 wnl except LDLc 102, LFT wnl. Will continue Lipitor 20 mg daily. Will discuss with the patient about getting a repeat lipid profile before year end. Shortness of breath. No evidence of heart failure or reported symptoms at rest, exertion, PND, orthonpea. Syncope  Reported episodes of lightheadedness and dizziness. We decreased her coreg to 3.125 mg BID with symptoms resolution. She denies any further episodes of near syncope or syncope. Fatigue  She currently denies fatigue is stable and walking for exercise with light weights. Follow up in 3-4 months. Complexity of medical decision making-high    Thank you very much for allowing me to participate in the care of your patient.  Please do not hesitate to contact me if you have any questions.     Sincerely,  Ender Tate MD      [facility], [address], [place] [address]  Ph: [phone]  Fax: [phone]      This note was scribed in the presence of Dr. Angelina Scott MD by William Corley RN.    6/16/2021

## 2021-06-28 ENCOUNTER — PATIENT MESSAGE (OUTPATIENT)
Dept: FAMILY MEDICINE CLINIC | Age: 71
End: 2021-06-28

## 2021-06-28 DIAGNOSIS — R60.0 LEG EDEMA: Primary | ICD-10-CM

## 2021-07-05 ENCOUNTER — PATIENT MESSAGE (OUTPATIENT)
Dept: FAMILY MEDICINE CLINIC | Age: 71
End: 2021-07-05

## 2021-07-05 DIAGNOSIS — R19.8 ABDOMINAL FULLNESS: Primary | ICD-10-CM

## 2021-07-06 NOTE — TELEPHONE ENCOUNTER
From: Jasen Bashir  To: Marisol Landers MD  Sent: 7/5/2021 1:39 PM EDT  Subject: Non-Urgent Medical Question    It does appear that my cardiologist is correct about this edema not being from CHF as the water pills don't seem to be doing anything toward clearing it up at all. He thought it might be intestinal and that I'd need to see a specialist and that I needed to tell you. I don't know if I need a referral or not but could you please refer me so I can hopefully get this taken care of ASAP. Thank you. I want to play with my 6year old very energetic grandson. Always with the running games.

## 2021-07-07 ENCOUNTER — PATIENT MESSAGE (OUTPATIENT)
Dept: FAMILY MEDICINE CLINIC | Age: 71
End: 2021-07-07

## 2021-07-07 ENCOUNTER — HOSPITAL ENCOUNTER (OUTPATIENT)
Dept: GENERAL RADIOLOGY | Age: 71
Discharge: HOME | End: 2021-07-07
Payer: MEDICARE

## 2021-07-07 ENCOUNTER — HOSPITAL ENCOUNTER (OUTPATIENT)
Dept: ULTRASOUND IMAGING | Age: 71
Discharge: HOME | End: 2021-07-07
Payer: MEDICARE

## 2021-07-07 DIAGNOSIS — E78.5 HYPERLIPIDEMIA, UNSPECIFIED HYPERLIPIDEMIA TYPE: ICD-10-CM

## 2021-07-07 DIAGNOSIS — R60.0 LEG EDEMA: ICD-10-CM

## 2021-07-07 DIAGNOSIS — M17.0 BILATERAL PRIMARY OSTEOARTHRITIS OF KNEE: Primary | ICD-10-CM

## 2021-07-07 DIAGNOSIS — I25.10 CORONARY ARTERY DISEASE INVOLVING NATIVE CORONARY ARTERY OF NATIVE HEART WITHOUT ANGINA PECTORIS: ICD-10-CM

## 2021-07-07 DIAGNOSIS — R19.8 ABDOMINAL FULLNESS: ICD-10-CM

## 2021-07-07 DIAGNOSIS — M15.9 PRIMARY OSTEOARTHRITIS INVOLVING MULTIPLE JOINTS: ICD-10-CM

## 2021-07-07 LAB
A/G RATIO: 1.7 (ref 1.1–2.2)
ALBUMIN SERPL-MCNC: 4.3 G/DL (ref 3.4–5)
ALP BLD-CCNC: 222 U/L (ref 40–129)
ALT SERPL-CCNC: 25 U/L (ref 10–40)
ANION GAP SERPL CALCULATED.3IONS-SCNC: 14 MMOL/L (ref 3–16)
AST SERPL-CCNC: 24 U/L (ref 15–37)
BILIRUB SERPL-MCNC: 0.4 MG/DL (ref 0–1)
BUN BLDV-MCNC: 19 MG/DL (ref 7–20)
CALCIUM SERPL-MCNC: 9.3 MG/DL (ref 8.3–10.6)
CHLORIDE BLD-SCNC: 98 MMOL/L (ref 99–110)
CHOLESTEROL, FASTING: 198 MG/DL (ref 0–199)
CO2: 26 MMOL/L (ref 21–32)
CREAT SERPL-MCNC: 1 MG/DL (ref 0.6–1.2)
GFR AFRICAN AMERICAN: >60
GFR NON-AFRICAN AMERICAN: 55
GLOBULIN: 2.5 G/DL
GLUCOSE BLD-MCNC: 100 MG/DL (ref 70–99)
HDLC SERPL-MCNC: 46 MG/DL (ref 40–60)
LDL CHOLESTEROL CALCULATED: 120 MG/DL
POTASSIUM SERPL-SCNC: 4.4 MMOL/L (ref 3.5–5.1)
SODIUM BLD-SCNC: 138 MMOL/L (ref 136–145)
TOTAL PROTEIN: 6.8 G/DL (ref 6.4–8.2)
TRIGLYCERIDE, FASTING: 160 MG/DL (ref 0–150)
TSH REFLEX: 2.59 UIU/ML (ref 0.27–4.2)
VLDLC SERPL CALC-MCNC: 32 MG/DL

## 2021-07-07 PROCEDURE — 76700 US EXAM ABDOM COMPLETE: CPT

## 2021-07-07 PROCEDURE — 73560 X-RAY EXAM OF KNEE 1 OR 2: CPT

## 2021-07-08 ENCOUNTER — PATIENT MESSAGE (OUTPATIENT)
Dept: FAMILY MEDICINE CLINIC | Age: 71
End: 2021-07-08

## 2021-07-08 DIAGNOSIS — M17.0 BILATERAL PRIMARY OSTEOARTHRITIS OF KNEE: Primary | ICD-10-CM

## 2021-07-08 NOTE — TELEPHONE ENCOUNTER
From: Francesca Marquez  To: Mac Palomino MD  Sent: 7/8/2021 10:36 AM EDT  Subject: Non-Urgent Medical Question    Is there an orthopedic surgeon you can refer me to with more experience plus a good reputation? I would feel a lot more comfortable with that. I really liked the ones they had there before. Seems like everything everywhere is changing after this last year.

## 2021-07-09 ENCOUNTER — OFFICE VISIT (OUTPATIENT)
Dept: ORTHOPEDIC SURGERY | Age: 71
End: 2021-07-09
Payer: MEDICARE

## 2021-07-09 ENCOUNTER — OFFICE VISIT (OUTPATIENT)
Dept: FAMILY MEDICINE CLINIC | Age: 71
End: 2021-07-09
Payer: MEDICARE

## 2021-07-09 ENCOUNTER — HOSPITAL ENCOUNTER (OUTPATIENT)
Age: 71
Discharge: HOME | End: 2021-07-09
Payer: MEDICARE

## 2021-07-09 ENCOUNTER — HOSPITAL ENCOUNTER (OUTPATIENT)
Dept: GENERAL RADIOLOGY | Age: 71
Discharge: HOME | End: 2021-07-09
Payer: MEDICARE

## 2021-07-09 ENCOUNTER — PATIENT MESSAGE (OUTPATIENT)
Dept: FAMILY MEDICINE CLINIC | Age: 71
End: 2021-07-09

## 2021-07-09 VITALS — BODY MASS INDEX: 28.34 KG/M2 | HEIGHT: 62 IN | WEIGHT: 154 LBS

## 2021-07-09 VITALS
SYSTOLIC BLOOD PRESSURE: 115 MMHG | DIASTOLIC BLOOD PRESSURE: 80 MMHG | BODY MASS INDEX: 28.45 KG/M2 | WEIGHT: 154.6 LBS | HEART RATE: 87 BPM | RESPIRATION RATE: 14 BRPM | OXYGEN SATURATION: 98 % | HEIGHT: 62 IN

## 2021-07-09 DIAGNOSIS — R19.8 ABDOMINAL FULLNESS: ICD-10-CM

## 2021-07-09 DIAGNOSIS — M25.561 PAIN IN BOTH KNEES, UNSPECIFIED CHRONICITY: ICD-10-CM

## 2021-07-09 DIAGNOSIS — M25.562 PAIN IN BOTH KNEES, UNSPECIFIED CHRONICITY: ICD-10-CM

## 2021-07-09 DIAGNOSIS — G89.29 CHRONIC BILATERAL LOW BACK PAIN WITHOUT SCIATICA: ICD-10-CM

## 2021-07-09 DIAGNOSIS — K59.00 CONSTIPATION, UNSPECIFIED CONSTIPATION TYPE: ICD-10-CM

## 2021-07-09 DIAGNOSIS — M17.12 PRIMARY OSTEOARTHRITIS OF LEFT KNEE: Primary | ICD-10-CM

## 2021-07-09 DIAGNOSIS — R74.8 ELEVATED ALKALINE PHOSPHATASE LEVEL: ICD-10-CM

## 2021-07-09 DIAGNOSIS — M17.11 PRIMARY OSTEOARTHRITIS OF RIGHT KNEE: ICD-10-CM

## 2021-07-09 DIAGNOSIS — M54.50 CHRONIC BILATERAL LOW BACK PAIN WITHOUT SCIATICA: ICD-10-CM

## 2021-07-09 DIAGNOSIS — M15.9 PRIMARY OSTEOARTHRITIS INVOLVING MULTIPLE JOINTS: Chronic | ICD-10-CM

## 2021-07-09 DIAGNOSIS — R14.0 ABDOMINAL BLOATING: Primary | ICD-10-CM

## 2021-07-09 PROCEDURE — 99214 OFFICE O/P EST MOD 30 MIN: CPT | Performed by: FAMILY MEDICINE

## 2021-07-09 PROCEDURE — 74018 RADEX ABDOMEN 1 VIEW: CPT

## 2021-07-09 PROCEDURE — 20610 DRAIN/INJ JOINT/BURSA W/O US: CPT | Performed by: ORTHOPAEDIC SURGERY

## 2021-07-09 RX ORDER — BUPIVACAINE HYDROCHLORIDE 2.5 MG/ML
3 INJECTION, SOLUTION INFILTRATION; PERINEURAL ONCE
Status: COMPLETED | OUTPATIENT
Start: 2021-07-09 | End: 2021-07-09

## 2021-07-09 RX ORDER — METHYLPREDNISOLONE ACETATE 40 MG/ML
80 INJECTION, SUSPENSION INTRA-ARTICULAR; INTRALESIONAL; INTRAMUSCULAR; SOFT TISSUE ONCE
Status: COMPLETED | OUTPATIENT
Start: 2021-07-09 | End: 2021-07-09

## 2021-07-09 RX ORDER — HYDROCODONE BITARTRATE AND ACETAMINOPHEN 7.5; 325 MG/1; MG/1
1 TABLET ORAL EVERY 8 HOURS PRN
Qty: 90 TABLET | Refills: 0 | Status: SHIPPED | OUTPATIENT
Start: 2021-07-09 | End: 2021-08-09 | Stop reason: SDUPTHER

## 2021-07-09 RX ADMIN — BUPIVACAINE HYDROCHLORIDE 7.5 MG: 2.5 INJECTION, SOLUTION INFILTRATION; PERINEURAL at 10:48

## 2021-07-09 RX ADMIN — BUPIVACAINE HYDROCHLORIDE 7.5 MG: 2.5 INJECTION, SOLUTION INFILTRATION; PERINEURAL at 10:49

## 2021-07-09 RX ADMIN — METHYLPREDNISOLONE ACETATE 80 MG: 40 INJECTION, SUSPENSION INTRA-ARTICULAR; INTRALESIONAL; INTRAMUSCULAR; SOFT TISSUE at 10:50

## 2021-07-09 RX ADMIN — METHYLPREDNISOLONE ACETATE 80 MG: 40 INJECTION, SUSPENSION INTRA-ARTICULAR; INTRALESIONAL; INTRAMUSCULAR; SOFT TISSUE at 10:49

## 2021-07-09 NOTE — PROGRESS NOTES
Lui Haywood  <[id number]>  July 9, 2021    Chief Complaint   Patient presents with    Knee Pain     bilateral       History: The patient is a 62-year-old female who is here for evaluation of both knees. The patient has had bilateral knee pain for many years. The pain has been increasing over the past 2 years. She has never really had any formal treatment for the knees. The pain is rather severe with activities. She.  Have not given way episodes recently and fell onto her left knee. She stopped taking regular anti-inflammatories approximately 5 years ago when she was diagnosed with congestive heart failure. She does take Vicodin on a regular basis for other orthopedic ailments. She has difficulty getting up from a seated position. She has difficulty with stairs. She denies any numbness or tingling. This is a consult from Griselda Kat MD  For bilateral knee pain. The patient's  past medical history, medications, allergies,  family history, social history, and have been reviewed, and dated and are recorded in the chart. Pertinent items are noted in HPI. Review of systems reviewed from Pertinent History Form dated on 7/9/2021 and available in the patient's chart under the Media tab. Vitals:  Ht 5' 2\" (1.575 m)   Wt 154 lb (69.9 kg)   BMI 28.17 kg/m²     Physical: Ms. Lui Haywood appears well, she is in no apparent distress, she demonstrates appropriate mood & affect. She is alert and oriented to person, place and time. Examination of the bilateral lower extremity reveals no pain with range of motion of the hip. She has generalized tenderness to palpation about the joint line of the bilateral knee. Range of motion is from 0 degrees to 125 degrees bilaterally. Strength is 4+ to 5/5 for all muscle groups about the bilateral knee. There is patellofemoral crepitus with range of motion of the bilateral knee. Varus and valgus stressing of the knees reveals no evidence of instability.  There is a small

## 2021-07-09 NOTE — TELEPHONE ENCOUNTER
From: Therese Gomez  To: Tito Gurrola MD  Sent: 7/9/2021 1:38 PM EDT  Subject: Test Results Question    Regarding the additional blood tests you ordered for me today:   The lab there in your building at [facility] where I just had blood drawn 2 days ago told me at that time they would have enough blood drawn to do additional testing if you ordered more within the next week as they keep the blood for 7 days before discarding. When I took the order requisition to them today she told me it should have been ordered directly to them through you on the computer and then she also called someone and they told her that I needed to get more blood drawn because the blood they had drawn 2 days ago was requested by a different doctor. Dr. Ginger Collins, my cardiologist ordered several tests. I didn't think about it at the time but there was also one test ordered by your office that was fulfilled with that blood test also. I'm not sure it came from you directly or from someone asking for it on your behalf. Could you please see if you can get this straightened out with them? If not, let me know and I will go have my blood drawn again.     Thank you

## 2021-07-09 NOTE — PROGRESS NOTES
7/9/2021    This is a 78 y.o. female   Chief Complaint   Patient presents with    Other     Stomach swelling, uncomfortable. Nothing new has happened. HPI    Here for concerns for a sensation of stomach bloating and fullness. She feels her her abdomen is swollen or enlarged. This has been present for about 2 weeks. She has been trying Senna-S for a couple of doses and has not had a BM recently. She reports she typically has a BM about every 2 weeks and will go about 3 times in one day and has a large volume of stool at that time. She denies any significant pain. She reports getting uncomfortable after eating due to bloating and fullness, and goes down within an our or two of eating. Last BM was about 15 days ago per report.   - she had a normal abdominal ultrasound 2 days ago  - tried lasix and hasn't noticed a difference  - she reports she is still passing gas    Elevated alk phos   - noted incidentally in blood work from her Cardiologist.    Review of Systems     Current Outpatient Medications   Medication Sig Dispense Refill    acetaminophen (TYLENOL) 325 MG tablet Take 650 mg by mouth every 4 hours as needed      furosemide (LASIX) 40 MG tablet Take 1 tablet by mouth daily 30 tablet 4    diclofenac (CATAFLAM) 50 MG tablet TAKE ONE TABLET BY MOUTH TWICE A DAY AS NEEDED FOR PAIN 60 tablet 1    atorvastatin (LIPITOR) 20 MG tablet TAKE ONE TABLET BY MOUTH DAILY 90 tablet 0    oxybutynin (DITROPAN-XL) 10 MG extended release tablet TAKE ONE TABLET BY MOUTH DAILY 90 tablet 0    lisinopril (PRINIVIL;ZESTRIL) 5 MG tablet TAKE ONE TABLET BY MOUTH TWICE A  tablet 2    carvedilol (COREG) 3.125 MG tablet Take 1 tablet by mouth 2 times daily (with meals) 180 tablet 4    loratadine (CLARITIN) 10 MG capsule Take 10 mg by mouth daily      aspirin 81 MG tablet Take 81 mg by mouth daily      HYDROcodone-acetaminophen (NORCO) 7.5-325 MG per tablet Take 1 tablet by mouth every 8 hours as needed for Pain for up to 30 days. Intended supply: 30 days 90 tablet 0     No current facility-administered medications for this visit. /80 (Site: Right Upper Arm, Position: Sitting, Cuff Size: Medium Adult)   Pulse 87   Resp 14   Ht 5' 2\" (1.575 m)   Wt 154 lb 9.6 oz (70.1 kg)   SpO2 98%   BMI 28.28 kg/m²     Physical Exam  Constitutional:       Appearance: She is well-developed. HENT:      Head: Normocephalic and atraumatic. Pulmonary:      Effort: Pulmonary effort is normal.   Abdominal:      Tenderness: There is no guarding or rebound. Comments: Fullness with no palpable mass or focal tenderness to palpation   Skin:     Coloration: Skin is not pale. Neurological:      Mental Status: She is alert and oriented to person, place, and time. Wt Readings from Last 3 Encounters:   07/09/21 154 lb (69.9 kg)   07/09/21 154 lb 9.6 oz (70.1 kg)   06/16/21 154 lb (69.9 kg)     BP Readings from Last 3 Encounters:   07/09/21 115/80   06/16/21 118/64   06/15/21 114/62     Assessment/Plan:  1. Abdominal bloating    2. Abdominal fullness    3. Constipation, unspecified constipation type  - XR ABDOMEN (KUB) (SINGLE AP VIEW); Future    4. Elevated alkaline phosphatase level  - ALKALINE PHOSPHATASE, BONE SPECIFIC; Future  - GAMMA GT; Future  - ALKALINE PHOSPHATASE; Future    Her x-ray shows significant stool burden without signs of obstruction. We discussed the use of senna and also consideration of an enema. If her symptoms worsen or fail to improve or she develops pain she will let us know. Separately, she had an elevated alkaline phosphatase test through her cardiologist.  We have added on the labs as above for further evaluation.   She recently had a normal abdominal ultrasound so a significant hepatic etiology is unlikely

## 2021-07-09 NOTE — TELEPHONE ENCOUNTER
It looks like this is an issue where we need to ask the lab if they can add on what has been ordered to her sample - if not she would have to go back for a blood draw. Thanks.

## 2021-07-10 DIAGNOSIS — R74.8 ELEVATED ALKALINE PHOSPHATASE LEVEL: ICD-10-CM

## 2021-07-10 LAB — GAMMA GLUTAMYL TRANSFERASE: 106 U/L (ref 5–36)

## 2021-07-12 DIAGNOSIS — R74.8 ELEVATED ALKALINE PHOSPHATASE LEVEL: Primary | ICD-10-CM

## 2021-07-12 NOTE — PROGRESS NOTES
Elevated alk phos with elevated GGT  Normal liver ultrasound  We will repeat in 2 to 4 weeks fasting.   If it remains elevated we will consider a CT abdomen with and without

## 2021-09-07 RX ORDER — OXYBUTYNIN CHLORIDE 10 MG/1
TABLET, EXTENDED RELEASE ORAL
Qty: 90 TABLET | Refills: 0 | Status: SHIPPED | OUTPATIENT
Start: 2021-09-07 | End: 2021-12-06

## 2021-09-07 RX ORDER — ATORVASTATIN CALCIUM 20 MG/1
TABLET, FILM COATED ORAL
Qty: 90 TABLET | Refills: 0 | Status: SHIPPED | OUTPATIENT
Start: 2021-09-07 | End: 2021-12-06

## 2021-11-05 ENCOUNTER — PATIENT MESSAGE (OUTPATIENT)
Dept: FAMILY MEDICINE CLINIC | Age: 71
End: 2021-11-05

## 2021-11-05 NOTE — TELEPHONE ENCOUNTER
From: Chilo Garner  To: Minoo Riggins MD  Sent: 11/5/2021 11:55 AM EDT  Subject: Non-Urgent Medical Question    While playing a version of football yesterday with my , son and grandson my  got carried away and blocked me hard first with the back of arm and then with a sharp blow to my nose with his elbow. Everyone heard what sounded like several bones cracking one after another and I felt very sharp pains that lasted throughout the night. I applied ice for about 1/2 hour or more and took Tylenol and my pain pill later that night. There was no bleeding and only minor bruising. As of this morning the pain has subsided for the most part. It's still pretty tender to the touch but not like a broken bone. Do I need an exray or just home care at this point? FYI, I got my covid booster shot yesterday. (My third Verle Laurie).

## 2021-11-08 ENCOUNTER — HOSPITAL ENCOUNTER (OUTPATIENT)
Age: 71
Discharge: HOME | End: 2021-11-08
Payer: MEDICARE

## 2021-11-08 ENCOUNTER — OFFICE VISIT (OUTPATIENT)
Dept: FAMILY MEDICINE CLINIC | Age: 71
End: 2021-11-08
Payer: MEDICARE

## 2021-11-08 ENCOUNTER — HOSPITAL ENCOUNTER (OUTPATIENT)
Dept: GENERAL RADIOLOGY | Age: 71
Discharge: HOME | End: 2021-11-08
Payer: MEDICARE

## 2021-11-08 VITALS
SYSTOLIC BLOOD PRESSURE: 110 MMHG | OXYGEN SATURATION: 97 % | HEART RATE: 88 BPM | WEIGHT: 147.2 LBS | RESPIRATION RATE: 16 BRPM | HEIGHT: 62 IN | DIASTOLIC BLOOD PRESSURE: 70 MMHG | BODY MASS INDEX: 27.09 KG/M2

## 2021-11-08 DIAGNOSIS — G89.4 CHRONIC PAIN SYNDROME: Chronic | ICD-10-CM

## 2021-11-08 DIAGNOSIS — J34.89 NASAL PAIN: ICD-10-CM

## 2021-11-08 DIAGNOSIS — M15.9 PRIMARY OSTEOARTHRITIS INVOLVING MULTIPLE JOINTS: Primary | Chronic | ICD-10-CM

## 2021-11-08 DIAGNOSIS — I25.10 CORONARY ARTERY DISEASE INVOLVING NATIVE CORONARY ARTERY OF NATIVE HEART WITHOUT ANGINA PECTORIS: ICD-10-CM

## 2021-11-08 DIAGNOSIS — E78.5 HYPERLIPIDEMIA, UNSPECIFIED HYPERLIPIDEMIA TYPE: ICD-10-CM

## 2021-11-08 DIAGNOSIS — I42.8 NONISCHEMIC CARDIOMYOPATHY (HCC): Primary | ICD-10-CM

## 2021-11-08 DIAGNOSIS — K59.09 CHRONIC CONSTIPATION: ICD-10-CM

## 2021-11-08 DIAGNOSIS — Z23 NEED FOR INFLUENZA VACCINATION: ICD-10-CM

## 2021-11-08 LAB
ALCOHOL URINE: NORMAL
AMPHETAMINE SCREEN, URINE: NEGATIVE
BARBITURATE SCREEN, URINE: NEGATIVE
BENZODIAZEPINE SCREEN, URINE: NEGATIVE
BUPRENORPHINE URINE: NORMAL
COCAINE METABOLITE SCREEN URINE: NEGATIVE
FENTANYL SCREEN, URINE: NORMAL
GABAPENTIN SCREEN, URINE: NORMAL
MDMA URINE: NEGATIVE
METHADONE SCREEN, URINE: NEGATIVE
METHAMPHETAMINE, URINE: NEGATIVE
OPIATE SCREEN URINE: POSITIVE
OXYCODONE SCREEN URINE: NEGATIVE
PHENCYCLIDINE SCREEN URINE: NEGATIVE
PROPOXYPHENE SCREEN, URINE: NORMAL
SYNTHETIC CANNABINOIDS(K2) SCREEN, URINE: NORMAL
THC SCREEN, URINE: NEGATIVE
TRAMADOL SCREEN URINE: NORMAL
TRICYCLIC ANTIDEPRESSANTS, UR: NEGATIVE

## 2021-11-08 PROCEDURE — G0008 ADMIN INFLUENZA VIRUS VAC: HCPCS | Performed by: FAMILY MEDICINE

## 2021-11-08 PROCEDURE — 70160 X-RAY EXAM OF NASAL BONES: CPT

## 2021-11-08 PROCEDURE — 80305 DRUG TEST PRSMV DIR OPT OBS: CPT | Performed by: FAMILY MEDICINE

## 2021-11-08 PROCEDURE — 90694 VACC AIIV4 NO PRSRV 0.5ML IM: CPT | Performed by: FAMILY MEDICINE

## 2021-11-08 PROCEDURE — 99214 OFFICE O/P EST MOD 30 MIN: CPT | Performed by: FAMILY MEDICINE

## 2021-11-08 RX ORDER — HYDROCODONE BITARTRATE AND ACETAMINOPHEN 7.5; 325 MG/1; MG/1
1 TABLET ORAL EVERY 8 HOURS PRN
Qty: 90 TABLET | Refills: 0 | Status: SHIPPED | OUTPATIENT
Start: 2021-11-08 | End: 2021-12-13 | Stop reason: SDUPTHER

## 2021-11-08 NOTE — PROGRESS NOTES
11/8/2021    This is a 73 y.o. female   Chief Complaint   Patient presents with    Facial Injury     Pt is worried that her nose might have some cracks in it after getting an elbow to the nose. pt said its very sore and tender and alittle discoloration.  Follow-up     Pt is also following up on hydrocodone. HPI    Thursday (about 4 days ago) playing football     Follow up chronic pain  - she is prescribed Norco 7.5mg TID PRN  - this is for osteoarthritis in multiple joints, including back pain and chronic pain syndrome  - she is able to get out and walk. Being out too much causes more knee pain  - able to drive and get out and do things  - she has had a cervical neck x-ray 2017 showing chronic degenerative change  - main side effect is chronic constipation  - enjoys walking to stay active as well as doing things around the home. Tries to keep up with her young grandson    Chronic constipation  -Chronic, intermittent issue for years. Has been on daily stool softeners which is helped, however, will sometimes go over a week without a bowel movement.     Review of Systems     Current Outpatient Medications   Medication Sig Dispense Refill    atorvastatin (LIPITOR) 20 MG tablet TAKE ONE TABLET BY MOUTH DAILY 90 tablet 0    oxybutynin (DITROPAN-XL) 10 MG extended release tablet TAKE ONE TABLET BY MOUTH DAILY 90 tablet 0    acetaminophen (TYLENOL) 325 MG tablet Take 650 mg by mouth every 4 hours as needed      furosemide (LASIX) 40 MG tablet Take 1 tablet by mouth daily 30 tablet 4    diclofenac (CATAFLAM) 50 MG tablet TAKE ONE TABLET BY MOUTH TWICE A DAY AS NEEDED FOR PAIN 60 tablet 1    lisinopril (PRINIVIL;ZESTRIL) 5 MG tablet TAKE ONE TABLET BY MOUTH TWICE A  tablet 2    carvedilol (COREG) 3.125 MG tablet Take 1 tablet by mouth 2 times daily (with meals) 180 tablet 4    aspirin 81 MG tablet Take 81 mg by mouth daily      loratadine (CLARITIN) 10 MG capsule Take 10 mg by mouth daily       No current facility-administered medications for this visit. /70 (Site: Right Upper Arm, Position: Sitting, Cuff Size: Medium Adult)   Pulse 88   Resp 16   Ht 5' 2\" (1.575 m)   Wt 147 lb 3.2 oz (66.8 kg)   SpO2 97%   BMI 26.92 kg/m²     Physical Exam  HENT:      Nose:      Comments: Mild tenderness palpation on the bridge of the nose  No obvious significant deviation  No septal hematoma noted      Wt Readings from Last 3 Encounters:   11/08/21 147 lb 3.2 oz (66.8 kg)   07/09/21 154 lb (69.9 kg)   07/09/21 154 lb 9.6 oz (70.1 kg)       BP Readings from Last 3 Encounters:   11/08/21 110/70   07/09/21 115/80   06/16/21 118/64       Assessment/Plan:  1. Primary osteoarthritis involving multiple joints  - POCT Rapid Drug Screen    2. Chronic pain syndrome  - POCT Rapid Drug Screen    3. Nasal pain  - XR NASAL BONE (MIN 3 VIEWS ); Future    4. Need for influenza vaccination  - INFLUENZA, QUADV, ADJUVANTED, 65 YRS =, IM, PF, PREFILL SYR, 0.5ML (FLUAD)    5. Chronic constipation  - AFL - Allan Denson MD, Gastroenterology, [facility]    Controlled substance policy reviewed in detail. Point-of-care drug screen today consistent. Stable on med for many years  OARRs report reviewed    X-ray for nose. Gave info for ENT if having more pain or breathing issues nasally    Referral to GI for chronic constipation. Discussed this possible side effect of chronic pain medication, she feels the symptoms not correlate timewise    Return in about 3 months (around 2/8/2022) for chronic pain f/u.

## 2021-11-08 NOTE — PATIENT INSTRUCTIONS
[address], and [address] [facility], [address], [address]  Ne[place] Mel[facility]  Ph: [phone]

## 2021-11-09 ENCOUNTER — PATIENT MESSAGE (OUTPATIENT)
Dept: FAMILY MEDICINE CLINIC | Age: 71
End: 2021-11-09

## 2021-11-09 DIAGNOSIS — J34.89 NASAL PAIN: Primary | ICD-10-CM

## 2021-11-09 DIAGNOSIS — S02.2XXA CLOSED FRACTURE OF NASAL BONE, INITIAL ENCOUNTER: ICD-10-CM

## 2021-11-09 NOTE — TELEPHONE ENCOUNTER
From: Moy Rojas  To: Dr. Ward Mao Day  Sent: 11/9/2021 4:17 PM EST  Subject: Referral to Dr Shekhar Page, Ear nose and throat    I just made an appointment with Dr Elmer Haas for tomorrow morning per Dr Waqar Calix referral but they said they didnt have a referral from him so if you could take care of that Id appreciate it.  Sincerely, Verenice Diaz 1950

## 2021-11-10 ENCOUNTER — OFFICE VISIT (OUTPATIENT)
Dept: ENT CLINIC | Age: 71
End: 2021-11-10
Payer: MEDICARE

## 2021-11-10 VITALS — BODY MASS INDEX: 27.05 KG/M2 | HEIGHT: 62 IN | WEIGHT: 147 LBS

## 2021-11-10 DIAGNOSIS — S02.2XXA CLOSED FRACTURE OF NASAL BONE, INITIAL ENCOUNTER: ICD-10-CM

## 2021-11-10 DIAGNOSIS — S09.93XA FACIAL INJURY, INITIAL ENCOUNTER: Primary | ICD-10-CM

## 2021-11-10 DIAGNOSIS — J34.2 DEVIATED NASAL SEPTUM: ICD-10-CM

## 2021-11-10 PROCEDURE — 99204 OFFICE O/P NEW MOD 45 MIN: CPT | Performed by: STUDENT IN AN ORGANIZED HEALTH CARE EDUCATION/TRAINING PROGRAM

## 2021-11-16 NOTE — PROGRESS NOTES
[address]   Cardiology Progress Note    CC: \"I still have some shortness of breath but the same. \"     History of present illness: Deacon Harden is a 73 y.o. female with past medical history significant for hypertension, hyperlipidemia and was recently diagnosed with non ischemic cardiomyopathy (2015). She does not want to proceed with AFIB ablation therapy. Today, she states that she continues with chronic PEARCE that she feels is unchanged with bending over, climbing stairs. She feels water pills helped her abdominal fullness and chest pressure but she is not on GI medical therapy for constipation and has f/u end of this month. Patient denies exertional chest pain/pressure, dyspnea at rest, changes in PEARCE, PND, orthopnea, palpitations, lightheadedness, weight changes, changes in LE edema, and syncope. She admits to medical therapy compliance and tolerating. Past Medical History:  Past Medical History:   Diagnosis Date    Anxiety     Arthritis     Broken nose     DJD (degenerative joint disease)     Essential hypertension 2012    Hyperlipidemia     Hypertension 2012    Mixed hyperlipidemia     Primary osteoarthritis involving multiple joints     Restless legs syndrome (RLS) 2014     Past Surgical History:     Past Surgical History:   Procedure Laterality Date    BREAST ENHANCEMENT SURGERY      CATARACT REMOVAL WITH IMPLANT Bilateral 2015    WRIST FRACTURE SURGERY  4/10/12    Right     Social History:    Social History     Tobacco Use    Smoking status: Former Smoker     Packs/day: 1.50     Years: 25.00     Pack years: 37.50     Quit date: 10/27/2004     Years since quittin.0    Smokeless tobacco: Never Used   Substance Use Topics    Alcohol use: No     Review of Systems:   Constitutional: No major weight gain or loss, fatigue, weakness, night sweats or fever. HEENT: No new vision difficulties or ringing in the ears.   Respiratory: No new SOB, PND, orthopnea or cough. Cardiovascular: See HPI , denies CP  GI: No n/v, diarrhea, constipation, abdominal pain or changes in bowel habits. No melena, no hematochezia  : No urinary frequency, urgency, incontinence, hematuria or dysuria. Skin: No cyanosis or skin lesions. Musculoskeletal: No new muscle or joint pain. Neurological: No syncope. Psychiatric: No anxiety, insomnia or depression    Current Outpatient Medications   Medication Sig Dispense Refill    HYDROcodone-acetaminophen (NORCO) 7.5-325 MG per tablet Take 1 tablet by mouth every 8 hours as needed for Pain for up to 30 days. Intended supply: 30 days 90 tablet 0    atorvastatin (LIPITOR) 20 MG tablet TAKE ONE TABLET BY MOUTH DAILY 90 tablet 0    oxybutynin (DITROPAN-XL) 10 MG extended release tablet TAKE ONE TABLET BY MOUTH DAILY 90 tablet 0    acetaminophen (TYLENOL) 325 MG tablet Take 650 mg by mouth every 4 hours as needed      furosemide (LASIX) 40 MG tablet Take 1 tablet by mouth daily 30 tablet 4    diclofenac (CATAFLAM) 50 MG tablet TAKE ONE TABLET BY MOUTH TWICE A DAY AS NEEDED FOR PAIN 60 tablet 1    lisinopril (PRINIVIL;ZESTRIL) 5 MG tablet TAKE ONE TABLET BY MOUTH TWICE A  tablet 2    carvedilol (COREG) 3.125 MG tablet Take 1 tablet by mouth 2 times daily (with meals) 180 tablet 4    aspirin 81 MG tablet Take 81 mg by mouth daily       No current facility-administered medications for this visit. No Known Allergies      PHYSICAL EXAMINATION:   Constitutional: She is oriented to person, place, and time. She is elderly and pleasant. In no acute distress. HEENT: Normocephalic and atraumatic. Sclerae anicteric. No xanthelasmas. EOM's intact. Neck: Supple. No JVD present. Carotids without bruits. No thyromegaly present. No lymphadenopathy present. Cardiovascular: RRR, normal S1 and S2; no murmur/gallop or rub  Pulmonary/Chest: Effort normal.  Lungs clear to auscultation.  Chest wall nontender  Abdominal: soft, nontender, nondistended. + bowel sounds; no hepatomegaly Extremities: No cyanosis, or clubbing. Pulses are 2+ radial/DP/PT bilaterally. Cap refill brisk. + varicosities to lower legs bilaterally. no edema. Neurological: No focal deficit. Skin: Skin is warm and dry. Psychiatric: She has a normal mood and affect. Her speech is normal and behavior is normal.       Labs:   Lab Results   Component Value Date    WBC 8.0 04/24/2019    HGB 13.4 04/24/2019    HCT 39.3 04/24/2019    MCV 91.8 04/24/2019     04/24/2019     Lab Results   Component Value Date     07/07/2021    K 4.4 07/07/2021    CL 98 07/07/2021    CO2 26 07/07/2021    BUN 19 07/07/2021    CREATININE 1.0 07/07/2021    GLUCOSE 100 07/07/2021    CALCIUM 9.3 07/07/2021      Lab Results   Component Value Date    TRIG 115 09/21/2017    HDL 46 07/07/2021    HDL 66 10/05/2011    LDLCALC 120 07/07/2021    LABVLDL 32 07/07/2021       Diagnostics:    EKG:    3/19/19: Sinus Rhythm with left bundle branch block. 9/14/20: not completed due to VV  6/16/21: SR with LBBB, ~60 bpm   11/17/21: SR with LBBB     ECHO 3/23/16  Summary  Left ventricular size is mildly increased. Normal left ventricular wall  thickness. Hypokinesis of the anteroseptal, and mid septal wall. Left  ventricular function is reduced with ejection fraction estimated at 35-40%. There is reversal of E/A inflow velocities across the mitral valve  suggesting impaired left ventricular relaxation. ECHO 6/1/18  Left ventricular cavity size is normal. Normal left ventricular wall  thickness. Ejection fraction is visually estimated to be 50-55%. No regional  wall motion abnormalities are noted. Normal diastolic function. Mitral valve leaflets appear mildly thickened. Trivial mitral regurgitation. The aortic valve is mildly thickened/calcified but opens well with normal gradients. Trivial aortic regurgitation. Mild tricuspid regurgitation.   IVC size is normal (<2.1cm) and collapses > 50% with respiration consistent  with normal RA pressure (3mmHg). Estimated pulmonary artery systolic pressure is normal at 26 mmHg assuming a  right atrial pressure of 3 mmHg. [facility] 9/25/2015:  1. Right coronary artery rises from the right sinus of Valsalva. It is a moderate-sized artery. It divides into the posterior descending and posterolateral branches. Right coronary artery and its branches are free of atherosclerosis. 2. Left main trunk is short and _____ divided into left anterior descending and left circumflex artery. Left main trunk appears to have mild atherosclerotic plaquing but it does not appear hemodynamically significant. 3. Left anterior descending artery: It is a moderate-sized artery. It descends into the intraventricular sulcus and wraps around the apex of the heart. The left anterior descending and its branches are free of any atherosclerosis. 4. Left circumflex artery. It arises from the left main trunk. It is a  moderate-sized artery and gives rise to a moderate-sized obtuse marginal branch. Left circumflex artery and its branches are free of atherosclerosis. 5. Left ventriculogram reveals a left ventricular enlargement with global left ventricular systolic dysfunction. Estimated ejection fraction is about  30% to 35%. No mitral regurgitation or intracavitary clots identified. Echo 9/25/2015:  Left ventricle size is normal.  Normal left ventricular wall thickness. Ejection fraction is severely reduced & visually estimated to be 25 to 30%. There is reversal of E/A inflow velocities across the mitral valve suggesting impaired left ventricular relaxation. There is abnormal (paradoxical) septal motion consistent with left bundle branch block. Trivial MR,AI & TR.     Myocardial perfusion scan 9/24/2015:  Decreased activity within the anteroseptal wall and apex on stress    images suggesting stress-induced ischemia      ECHO 6/1/18  Left ventricular cavity size is normal. Normal left ventricular wall  thickness. Ejection fraction is visually estimated to be 50-55%. No regional  wall motion abnormalities are noted. Normal diastolic function. Mitral valve leaflets appear mildly thickened. Trivial mitral regurgitation. The aortic valve is mildly thickened/calcified but opens well with normal  gradients. Trivial aortic regurgitation. Mild tricuspid regurgitation. IVC size is normal (<2.1cm) and collapses > 50% with respiration consistent  with normal RA pressure (3mmHg). Estimated pulmonary artery systolic pressure is normal at 26 mmHg assuming a  right atrial pressure of 3 mmHg. 30 day event monitor 7/3/19  Sinus rhythm. One or more episodes of sustained afib. At least 2-3 episodes of regular interval QRS tachycardia, most likely atrial flutter    Echo:3/12/20  Normal left ventricle size, wall thickness, and systolic function with an   estimated ejection fraction of 55-60%. No regional wall motion abnormalities are seen. Mitral annular calcification is present. Trivial mitral regurgitation is present. Mild tricuspid regurgitation. Assessment and Plan:    Atrial fibrillation  She did wear a 30 day event monitor 7/2019 which did showed episode of atrial fibrillation and most likely atrial flutter. She also was evaluated per Dr. Vitaliy Galindo and was offered a flutter/fibrillation ablation, she did not proceed with ablation. She also stopped taking her Eliquis. she continues to deny breakthrough AFIB, symptoms or TIA/CVA like symptoms and has remained stable since our last visit. EKG today SR with LBBB. I have once again discussed with her increased risk of a stroke without taking the Eliquis since her chads Vasc score is at least 3. She has declined both eliquis and ablation therapy. Will continue to monitor. Nonischemic cardiomyopathy. She continues with PEARCE with climbing stairs, bending over. She will continue her current dose of Coreg and Lisinopril.    She is participate in the care of your patient. Please do not hesitate to contact me if you have any questions.     Sincerely,  Lucille Dakin, MD      [address] [place] [address]  Ph: [phone]  Fax: [phone]    This note was scribed in the presence of Dr. Kunal Waite MD by Mack Swartz RN.      11/17/2021

## 2021-11-17 ENCOUNTER — OFFICE VISIT (OUTPATIENT)
Dept: CARDIOLOGY CLINIC | Age: 71
End: 2021-11-17
Payer: MEDICARE

## 2021-11-17 VITALS
HEIGHT: 62 IN | BODY MASS INDEX: 27.23 KG/M2 | HEART RATE: 72 BPM | WEIGHT: 148 LBS | SYSTOLIC BLOOD PRESSURE: 120 MMHG | OXYGEN SATURATION: 98 % | DIASTOLIC BLOOD PRESSURE: 72 MMHG

## 2021-11-17 DIAGNOSIS — R06.09 DOE (DYSPNEA ON EXERTION): ICD-10-CM

## 2021-11-17 DIAGNOSIS — I10 ESSENTIAL HYPERTENSION: ICD-10-CM

## 2021-11-17 DIAGNOSIS — I48.0 PAROXYSMAL ATRIAL FIBRILLATION (HCC): Primary | ICD-10-CM

## 2021-11-17 DIAGNOSIS — I25.10 CORONARY ARTERY DISEASE INVOLVING NATIVE CORONARY ARTERY OF NATIVE HEART WITHOUT ANGINA PECTORIS: ICD-10-CM

## 2021-11-17 DIAGNOSIS — E78.5 HYPERLIPIDEMIA, UNSPECIFIED HYPERLIPIDEMIA TYPE: ICD-10-CM

## 2021-11-17 DIAGNOSIS — I42.8 NICM (NONISCHEMIC CARDIOMYOPATHY) (HCC): ICD-10-CM

## 2021-11-17 PROCEDURE — 93000 ELECTROCARDIOGRAM COMPLETE: CPT | Performed by: INTERNAL MEDICINE

## 2021-11-17 PROCEDURE — 99214 OFFICE O/P EST MOD 30 MIN: CPT | Performed by: INTERNAL MEDICINE

## 2021-11-17 RX ORDER — FUROSEMIDE 40 MG/1
40 TABLET ORAL DAILY
Qty: 90 TABLET | Refills: 1 | Status: SHIPPED | OUTPATIENT
Start: 2021-11-17 | End: 2021-12-03

## 2021-12-03 RX ORDER — FUROSEMIDE 40 MG/1
TABLET ORAL
Qty: 90 TABLET | Refills: 1 | Status: SHIPPED | OUTPATIENT
Start: 2021-12-03 | End: 2022-06-10 | Stop reason: SDUPTHER

## 2021-12-03 NOTE — TELEPHONE ENCOUNTER
Last OV: 11/17/21  Next OV: 6mth f/u 5/2022  Last refill:11/17/21  Most recent Labs: 7/7/21  Last EKG (if needed):11/17/21

## 2021-12-06 RX ORDER — OXYBUTYNIN CHLORIDE 10 MG/1
TABLET, EXTENDED RELEASE ORAL
Qty: 90 TABLET | Refills: 0 | Status: SHIPPED | OUTPATIENT
Start: 2021-12-06

## 2021-12-06 RX ORDER — ATORVASTATIN CALCIUM 20 MG/1
TABLET, FILM COATED ORAL
Qty: 90 TABLET | Refills: 0 | Status: SHIPPED | OUTPATIENT
Start: 2021-12-06 | End: 2022-03-30 | Stop reason: SDUPTHER

## 2021-12-06 RX ORDER — CARVEDILOL 3.12 MG/1
TABLET ORAL
Qty: 180 TABLET | Refills: 2 | Status: SHIPPED | OUTPATIENT
Start: 2021-12-06 | End: 2022-09-06

## 2021-12-06 RX ORDER — LISINOPRIL 5 MG/1
TABLET ORAL
Qty: 180 TABLET | Refills: 2 | Status: SHIPPED
Start: 2021-12-06 | End: 2022-06-24

## 2021-12-29 ENCOUNTER — PATIENT MESSAGE (OUTPATIENT)
Dept: FAMILY MEDICINE CLINIC | Age: 71
End: 2021-12-29

## 2021-12-29 DIAGNOSIS — D50.9 IRON DEFICIENCY ANEMIA, UNSPECIFIED IRON DEFICIENCY ANEMIA TYPE: Primary | ICD-10-CM

## 2021-12-29 NOTE — TELEPHONE ENCOUNTER
From: Thiago Branch  To: Dr. Nydia Johnson Day  Sent: 12/29/2021 10:52 AM EST  Subject: Please include testing my iron level in bloodwork    I stopped taking iron supplements quite a while ago bc it upset my stomach so bad. I had been taking them for years. I just want to see if I need to still take them or not so would you please add that test to the blood work I need to get done?  Thank you

## 2022-01-11 ENCOUNTER — PATIENT MESSAGE (OUTPATIENT)
Dept: FAMILY MEDICINE CLINIC | Age: 72
End: 2022-01-11

## 2022-01-11 DIAGNOSIS — M15.9 PRIMARY OSTEOARTHRITIS INVOLVING MULTIPLE JOINTS: Chronic | ICD-10-CM

## 2022-01-11 DIAGNOSIS — G89.4 CHRONIC PAIN SYNDROME: Chronic | ICD-10-CM

## 2022-01-11 RX ORDER — HYDROCODONE BITARTRATE AND ACETAMINOPHEN 7.5; 325 MG/1; MG/1
1 TABLET ORAL EVERY 8 HOURS PRN
Qty: 90 TABLET | Refills: 0 | Status: SHIPPED | OUTPATIENT
Start: 2022-01-11 | End: 2022-02-10

## 2022-01-11 NOTE — TELEPHONE ENCOUNTER
From: Patricia Going  To: Dr. Harley Buckley Day  Sent: 1/11/2022 8:31 AM EST  Subject: Prescription refill    Please send in my refill of Hydrocodone-Acetaminophen 7.5. Qty. 90 to my [facility] [id number].    Thank you

## 2022-01-27 ENCOUNTER — HOSPITAL ENCOUNTER (OUTPATIENT)
Dept: NON INVASIVE DIAGNOSTICS | Age: 72
Discharge: HOME | End: 2022-01-27
Payer: MEDICARE

## 2022-01-27 DIAGNOSIS — D50.9 IRON DEFICIENCY ANEMIA, UNSPECIFIED IRON DEFICIENCY ANEMIA TYPE: ICD-10-CM

## 2022-01-27 DIAGNOSIS — R19.8 ABDOMINAL FULLNESS: ICD-10-CM

## 2022-01-27 DIAGNOSIS — I42.8 NICM (NONISCHEMIC CARDIOMYOPATHY) (HCC): ICD-10-CM

## 2022-01-27 DIAGNOSIS — R06.09 DOE (DYSPNEA ON EXERTION): ICD-10-CM

## 2022-01-27 LAB
ANION GAP SERPL CALCULATED.3IONS-SCNC: 13 MMOL/L (ref 3–16)
BUN BLDV-MCNC: 9 MG/DL (ref 7–20)
CALCIUM SERPL-MCNC: 9.2 MG/DL (ref 8.3–10.6)
CHLORIDE BLD-SCNC: 97 MMOL/L (ref 99–110)
CO2: 28 MMOL/L (ref 21–32)
CREAT SERPL-MCNC: 0.8 MG/DL (ref 0.6–1.2)
FERRITIN: 324 NG/ML (ref 15–150)
GFR AFRICAN AMERICAN: >60
GFR NON-AFRICAN AMERICAN: >60
GLUCOSE BLD-MCNC: 111 MG/DL (ref 70–99)
HEMOGLOBIN: 13.8 G/DL (ref 12–16)
LV EF: 55 %
LVEF MODALITY: NORMAL
POTASSIUM SERPL-SCNC: 3.9 MMOL/L (ref 3.5–5.1)
SODIUM BLD-SCNC: 138 MMOL/L (ref 136–145)

## 2022-01-27 PROCEDURE — 93306 TTE W/DOPPLER COMPLETE: CPT

## 2022-01-28 ENCOUNTER — TELEPHONE (OUTPATIENT)
Dept: CARDIOLOGY CLINIC | Age: 72
End: 2022-01-28

## 2022-01-28 NOTE — TELEPHONE ENCOUNTER
Spoke with pt she stated she has a colonoscopy scheduled with Dr Jodee Patricia on 2/22/22. Wanted to know if it is safe for her to have the colonoscopy with her hx of CHF and A-fib?  Please advise thank you

## 2022-01-28 NOTE — TELEPHONE ENCOUNTER
Informed pt as per understanding.  Spoke with Dr Deniz Bull office, they are leaving a message with the MA and will have her call us back

## 2022-01-28 NOTE — TELEPHONE ENCOUNTER
Attempted to contact pt, unable to leave message. Mailbox full. Dr. Nerissa Dey will likely have no contraindications to patient having colonoscopy. Please clarify with Dr. Yonatan Urias if any medication would need to be held and for how long. Thanks.

## 2022-01-31 ENCOUNTER — TELEPHONE (OUTPATIENT)
Dept: ORTHOPEDIC SURGERY | Age: 72
End: 2022-01-31

## 2022-01-31 ENCOUNTER — OFFICE VISIT (OUTPATIENT)
Dept: ORTHOPEDIC SURGERY | Age: 72
End: 2022-01-31
Payer: MEDICARE

## 2022-01-31 VITALS — WEIGHT: 148 LBS | BODY MASS INDEX: 27.94 KG/M2 | HEIGHT: 61 IN

## 2022-01-31 DIAGNOSIS — S42.214A CLOSED NONDISPLACED FRACTURE OF SURGICAL NECK OF RIGHT HUMERUS, UNSPECIFIED FRACTURE MORPHOLOGY, INITIAL ENCOUNTER: ICD-10-CM

## 2022-01-31 DIAGNOSIS — M25.551 ACUTE RIGHT HIP PAIN: Primary | ICD-10-CM

## 2022-01-31 LAB — ALP BLD-CCNC: 173 U/L (ref 40–129)

## 2022-01-31 PROCEDURE — 99214 OFFICE O/P EST MOD 30 MIN: CPT | Performed by: ORTHOPAEDIC SURGERY

## 2022-01-31 PROCEDURE — 23600 CLTX PROX HUMRL FX W/O MNPJ: CPT | Performed by: ORTHOPAEDIC SURGERY

## 2022-01-31 RX ORDER — TIZANIDINE 4 MG/1
4 TABLET ORAL 3 TIMES DAILY
Qty: 60 TABLET | Refills: 0 | Status: SHIPPED | OUTPATIENT
Start: 2022-01-31 | End: 2022-05-24 | Stop reason: SDUPTHER

## 2022-01-31 NOTE — TELEPHONE ENCOUNTER
General Question     Subject: PATIENT NEEDS DISCHARGE PAPERS FROM APPT TODAY. PLEASE ADVISE.    Patient: Amado Rae  Contact Number: [phone]

## 2022-02-01 ENCOUNTER — TELEPHONE (OUTPATIENT)
Dept: ORTHOPEDIC SURGERY | Age: 72
End: 2022-02-01

## 2022-02-01 NOTE — TELEPHONE ENCOUNTER
General Question     Subject: PATIENT WOULD LIKE TO KNOW IF THE MRI HAS BEEN SCHEDULED. PLEASE ADVISE.     Patient:  Jyotsna Tejeda  Contact Number: [phone]

## 2022-02-01 NOTE — TELEPHONE ENCOUNTER
Called patient. She will call Dr. Craig Innocent office and ask it she needs to hold any medication and for how long. She will call us back with an answer.

## 2022-02-02 NOTE — PROGRESS NOTES
Patient : William I Behrendt Age: 59 year old Sex: male   MRN: [id number] Encounter Date: 5/3/2018      History     Chief Complaint   Patient presents with   • Hand Pain     HPI  5/3/2018  9:13 PM William I Behrendt is a 59 year old male who presents to the ED c/o left palm pain that began in the beginning of last week. The patient reports that he thought it was soreness but it has increased in pain. Patient works at [employer] and states he \"felt a line like a cord under his skin\". He has a hx of a DVT in the same arm but is unsure how that happened.  Patient reports that around Wednesday last week he did possibly injure his hand at work. Pain is exacerbated with use and he states that he cannot open his hand fully because of \"the pulling sensation\". He has done nothing for the pain and is not currently on any medication. Patient denies recent travel or surgeries. There are no further complaints or modifying factors at this time.    PCP: Indra Mota Jr., MD    No Known Allergies    Prior to Admission Medications    ASCORBIC ACID (VITAMIN C) 500 MG TABLET    Take 500 mg by mouth daily. Not sure of dose     BETA CAROTENE (VITAMIN A) 34951 UNIT CAPSULE    Take 25,000 Units by mouth daily.      CRANBERRY 450 MG CAPS    Take 450 mg by mouth 2 times daily.      VITAMIN - THERAPEUTIC MULTIVITAMINS W/MINERALS (CENTRUM SILVER,THERA-M) TABS    Take 1 tablet by mouth daily.       Past Medical History:   Diagnosis Date   • Arthritis    • DVT of upper extremity (deep vein thrombosis) (CMS/[facility]) 04/05/2002    left arm, unknown cause   • Inguinal hernia     left sided   • Ureteral stone 06/05/2008       Past Surgical History:   Procedure Laterality Date   • COLONOSCOPY  6/29/12   • HERNIA REPAIR         Family History   Problem Relation Age of Onset   • Arthritis Father    • Cancer Father 91     pancreatic cancer   • Dementia/Alzheimers Mother    • Arthritis Brother      hip resurfacing       Social History   Substance Use Topics   •  Smoking status: Never Smoker   • Smokeless tobacco: Never Used   • Alcohol use No       Review of Systems   Constitutional: Negative for activity change, diaphoresis and fever.   HENT: Negative for congestion, rhinorrhea, sore throat and voice change.    Eyes: Negative for discharge, redness and itching.   Respiratory: Negative for cough, chest tightness and shortness of breath.    Cardiovascular: Negative for chest pain and leg swelling.   Gastrointestinal: Negative for abdominal pain, diarrhea, nausea and vomiting.   Genitourinary: Negative for difficulty urinating and dysuria.   Musculoskeletal: Positive for arthralgias (left palm pain). Negative for back pain and neck pain.   Skin: Negative for rash.   Neurological: Negative for syncope, weakness and headaches.   Psychiatric/Behavioral: Negative for confusion. The patient is not nervous/anxious.        Physical Exam     ED Triage Vitals [05/03/18 1950]   ED Triage Vitals Group      Temp 99.5 °F (37.5 °C)      Pulse 78      Resp 16      /85      SpO2 97 %      EtCO2 mmHg       Height 5' 10\" (1.778 m)      Weight 160 lb (72.6 kg)      Weight Scale Used ED Stated       Physical Exam   Constitutional: He is oriented to person, place, and time. He appears well-developed. No distress.   HENT:   Head: Normocephalic and atraumatic.   Eyes: Pupils are equal, round, and reactive to light. No scleral icterus.   Neck: Normal range of motion.   Pulmonary/Chest: Effort normal. No respiratory distress.   Musculoskeletal:   Slight tenderness to web space, left hand digit one and two. No swelling or redness.   Good cap refill.  Good radial and ulnar pulses.  Compartments are soft without swelling.  No bony tenderness to wrist or hand.   Neurological: He is alert and oriented to person, place, and time.   Skin: Skin is warm and dry. He is not diaphoretic.   Psychiatric: He has a normal mood and affect. His behavior is normal. Thought content normal.       ED Course  exam 4+ to 5/5 in all major motor groups. RLE: Examination demonstrates negative logroll and negative Stinchfield. There is brisk capillary refill. Strength is 5/5 in hamstrings, quads, hip flexors. She did get up and weight-bear and walk in the room. RUE: Ecchymosis and tenderness at the shoulder. Sensation intact light touch in axillary, radial, ulnar, median nerve distributions palpable radial pulse. She demonstrates active wrist flexion and extension. Imaging:  Prior right hip and knee radiographs reviewed and show no fractures or dislocations. Prior right shoulder radiographs reviewed and show a minimally displaced proximal humerus fracture. Assessment:  Right proximal humerus fracture  Right leg pain    Plan:  We discussed the diagnosis and treatment options. We discussed nonoperative versus operative management of her right proximal humerus fracture. The fracture is minimally displaced so I recommended nonoperative management at this time and continue treatment in a sling. Her bigger issue is her right leg pain. There is low concern for hip fracture as she has no issues with logroll or while she is sitting. She did also stand and walk for me in the room today. However, she continues to have pain, we will obtain an MRI of her right hip. She may also be having lumbar spine issues causing radiculopathy. Finally, tizanidine was prescribed to help with muscle spasms in her right leg. She will follow up here after the MRI. Total time spent on today's encounter was at least 32 minutes. This time included reviewing prior notes, radiographs, and lab results when available, reviewing history obtained by medical assistant, performing history and physical exam, reviewing tests/radiographs with the patient, counseling the patient, ordering medications or tests, documentation in the electronic health record, and coordination of care.     This dictation was done with Dragon dictation and may     Procedures    Lab Results     No results found for this visit on 05/03/18.      Radiology Results     Imaging Results    None         ED Medication Orders     None          [facility]  Vitals  Vitals:    05/03/18 1950   BP: 177/85   Pulse: 78   Resp: 16   Temp: 99.5 °F (37.5 °C)   TempSrc: Oral   SpO2: 97%   Weight: 72.6 kg   Height: 5' 10\" (1.778 m)       ED Course  9:14 PM Initial Encounter: The patient is a 59 year old male who presents to the ED c/o left palm pain. Upon physical examination, patient has good perfusion with no swelling so I do not believe this is a blood clot. Because the patient's pain is localized to a muscular location and is exacerbated with activity, I suspect musculoskeletal. I adivsed the patient to use anti-inflammatories for pain and to ice as needed for pain. A work note for tomorrow will be provided. We discussed further plan of care including follow up with PCP. Pt understands and agrees with plan. A return to ED warning was given for pain up his arm, redness, swelling or any new or worsening symptoms. All questions and concerns addressed.      [facility]  Critical Care time spent on this patient outside of billable procedures:  None    Clinical Impression  ED Diagnosis        Final diagnosis    Left hand pain           The patient was provided with a recommendation to follow up with a primary care provider and obtain reassessment of his/her blood pressure within three months.    Follow Up:  Indra Mota Jr., MD  [address] [address]  [phone]      As needed     Pt is discharged to home/self care in stable condition.       I have reviewed the information recorded by the scribe for accuracy and agree with its contents.    ____________________________________________________________________    Dayron Richardson acting as a scribe for Alfred Schrader PA-C.    Alfred Schrader PA-C  Dictation #[id number]  Scribe: Dayron Richardson    Attending Physician: Dr. Simon  contain mechanical errors related to translation. Libby  Dictation # [id number]         Alfred Schrader PA-C  05/03/18 [id number]

## 2022-02-08 ENCOUNTER — HOSPITAL ENCOUNTER (OUTPATIENT)
Dept: MRI IMAGING | Age: 72
Discharge: HOME | End: 2022-02-08
Payer: MEDICARE

## 2022-02-08 DIAGNOSIS — M25.551 ACUTE RIGHT HIP PAIN: ICD-10-CM

## 2022-02-08 PROCEDURE — 73721 MRI JNT OF LWR EXTRE W/O DYE: CPT

## 2022-02-09 ENCOUNTER — TELEPHONE (OUTPATIENT)
Dept: ORTHOPEDIC SURGERY | Age: 72
End: 2022-02-09

## 2022-02-09 NOTE — TELEPHONE ENCOUNTER
Called Dr Bean Martinez office [phone] to see what medications need to be held. The person who I spoke with states that patient canceled procedure.

## 2022-02-10 ENCOUNTER — PATIENT MESSAGE (OUTPATIENT)
Dept: FAMILY MEDICINE CLINIC | Age: 72
End: 2022-02-10

## 2022-02-10 ENCOUNTER — OFFICE VISIT (OUTPATIENT)
Dept: ORTHOPEDIC SURGERY | Age: 72
End: 2022-02-10
Payer: MEDICARE

## 2022-02-10 ENCOUNTER — TELEPHONE (OUTPATIENT)
Dept: ORTHOPEDIC SURGERY | Age: 72
End: 2022-02-10

## 2022-02-10 ENCOUNTER — ANESTHESIA EVENT (OUTPATIENT)
Dept: OPERATING ROOM | Age: 72
DRG: 481 | End: 2022-02-10
Payer: MEDICARE

## 2022-02-10 VITALS — WEIGHT: 148 LBS | BODY MASS INDEX: 27.94 KG/M2 | HEIGHT: 61 IN

## 2022-02-10 DIAGNOSIS — M15.9 PRIMARY OSTEOARTHRITIS INVOLVING MULTIPLE JOINTS: Chronic | ICD-10-CM

## 2022-02-10 DIAGNOSIS — S72.144A CLOSED NONDISPLACED INTERTROCHANTERIC FRACTURE OF RIGHT FEMUR, INITIAL ENCOUNTER (HCC): ICD-10-CM

## 2022-02-10 DIAGNOSIS — M25.551 ACUTE RIGHT HIP PAIN: Primary | ICD-10-CM

## 2022-02-10 DIAGNOSIS — G89.4 CHRONIC PAIN SYNDROME: Chronic | ICD-10-CM

## 2022-02-10 DIAGNOSIS — S42.214A CLOSED NONDISPLACED FRACTURE OF SURGICAL NECK OF RIGHT HUMERUS, UNSPECIFIED FRACTURE MORPHOLOGY, INITIAL ENCOUNTER: Primary | ICD-10-CM

## 2022-02-10 PROCEDURE — 99213 OFFICE O/P EST LOW 20 MIN: CPT | Performed by: ORTHOPAEDIC SURGERY

## 2022-02-10 RX ORDER — DIPHENHYDRAMINE HCL 25 MG
25 TABLET ORAL PRN
COMMUNITY

## 2022-02-10 RX ORDER — HYDROCODONE BITARTRATE AND ACETAMINOPHEN 7.5; 325 MG/1; MG/1
1 TABLET ORAL EVERY 8 HOURS PRN
Qty: 90 TABLET | Refills: 0 | Status: SHIPPED | OUTPATIENT
Start: 2022-02-10 | End: 2022-03-10 | Stop reason: SDUPTHER

## 2022-02-10 NOTE — PROGRESS NOTES
Preoperative Screening for Elective Surgery/Invasive Procedures While COVID-19 present in the community     1. Have you tested positive or have been told to self-isolate for COVID-19 like symptoms within the past 28 days?no  2. Do you currently have any of the following symptoms?no  ? Fever >100.0 F or 99.9 F in immunocompromised patients? NO  ? New onset cough, shortness of breath or difficulty breathing? NO  ? New onset sore throat, myalgia (muscle aches and pains), headache, loss of taste/smell or diarrhea? NO  3. Have you had a potential exposure to COVID-19 within the past 14 days by:no  ? Close contact with a confirmed case? NO  ? Close contact with a healthcare worker,  or essential infrastructure worker (grocery store, [employer], gas station, public utilities or transportation)?no  ? Do you reside in a congregate setting such as; skilled nursing facility, adult home, correctional facility, homeless shelter or other institutional setting? NO  ? Have you had recent travel to a known COVID-19 hotspot? NO     Indicate if the patient has a positive screen by answering yes to one or more of the above questions. C-Difficile admission screening and protocol:       * Admitted with diarrhea? [] YES    [x]  NO     *Prior history of C-Diff. In last 3 months? [] YES    [x]  NO     *Antibiotic use in the past 6-8 weeks? [x]  NO    []  YES                 If yes, which ANTIBIOTIC AND REASON______     *Prior hospitalization or nursing home in the last month? []  YES    [x]  NO         [facility] [place] PRE-OPERATIVE INSTRUCTIONS    Arrival time___1100-PER DR. JONES'S OFFICE_________        Surgery time__1PM__________    Take the following medications with a sip of water: Follow your MD/Surgeons pre-procedure instructions regarding your medications    Do not eat or drink anything after 12:00 midnight prior to your surgery. This includes water chewing gum, mints and ice chips. You may brush your teeth and gargle the morning of your surgery, but do not swallow the water     Please see your family doctor/pediatrician for a history and physical and/or concerning medications. Bring any test results/reports from your physicians office. If you are under the care of a heart doctor or specialist doctor, please be aware that you may be asked to them for clearance    You may be asked to stop blood thinners such as Coumadin, Plavix, Fragmin, Lovenox, etc., or any anti-inflammatories such as:  Aspirin, Ibuprofen, Advil, Naproxen prior to your surgery. We also ask that you stop any OTC medications such as fish oil, vitamin E, glucosamine, garlic, Multivitamins, COQ 10, etc.STOP DICLOFENAC-MAY TAKE TYLENOL    We ask that you do not smoke 24 hours prior to surgery  We ask that you do not  drink any alcoholic beverages 24 hours prior to surgery     You must make arrangements for a responsible adult to take you home after your surgery. For your safety you will not be allowed to leave alone or drive yourself home. Your surgery will be cancelled if you do not have a ride home. Also for your safety, it is strongly suggested that someone stay with you the first 24 hours after your surgery. A parent or legal guardian must accompany a child scheduled for surgery and plan to stay at the hospital until the child is discharged. Please do not bring other children with you. For your comfort, please wear simple loose fitting clothing to the hospital.  Please do not bring valuables. Do not wear any make-up or nail polish on your fingers or toes      For your safety, please do not wear any jewelry or body piercing's on the day of surgery. All jewelry must be removed. If you have dentures, they will be removed before going to operating room. For your convenience, we will provide you with a container.     If you wear contact lenses or glasses, they will be removed, please bring a

## 2022-02-10 NOTE — TELEPHONE ENCOUNTER
From: Judy Huber  To: Dr. Calixto Tate Day  Sent: 2/10/2022 11:42 AM EST  Subject: Prescription refill    Please send in my refill of Hydrocodone-Acetaminophen 7.5. Qty. 90 to my [facility].    Thank you

## 2022-02-10 NOTE — TELEPHONE ENCOUNTER
rx sent  Jorge Almanzar has a lot going on with a recent fractured hip  However we do need to make sure we meet up soon since I prescribe pain medication for her. Can we reach out to her for a virtual (which will be easier with her upcoming surgery) for a couple weeks out from surgery when she is up for it?  Thanks

## 2022-02-11 ENCOUNTER — HOSPITAL ENCOUNTER (INPATIENT)
Age: 72
LOS: 2 days | Discharge: HOME HEALTH | DRG: 481 | End: 2022-02-13
Attending: ORTHOPAEDIC SURGERY | Admitting: ORTHOPAEDIC SURGERY
Payer: MEDICARE

## 2022-02-11 ENCOUNTER — ANESTHESIA (OUTPATIENT)
Dept: OPERATING ROOM | Age: 72
DRG: 481 | End: 2022-02-11
Payer: MEDICARE

## 2022-02-11 ENCOUNTER — APPOINTMENT (OUTPATIENT)
Dept: GENERAL RADIOLOGY | Age: 72
DRG: 481 | End: 2022-02-11
Attending: ORTHOPAEDIC SURGERY
Payer: MEDICARE

## 2022-02-11 VITALS
DIASTOLIC BLOOD PRESSURE: 64 MMHG | RESPIRATION RATE: 11 BRPM | OXYGEN SATURATION: 100 % | SYSTOLIC BLOOD PRESSURE: 116 MMHG | TEMPERATURE: 96.1 F

## 2022-02-11 PROBLEM — S72.141A: Status: ACTIVE | Noted: 2022-02-11

## 2022-02-11 PROBLEM — Z98.890 STATUS POST-OPERATIVE REPAIR OF CLOSED FRACTURE OF RIGHT HIP: Status: ACTIVE | Noted: 2022-02-11

## 2022-02-11 PROBLEM — Z87.81 STATUS POST-OPERATIVE REPAIR OF CLOSED FRACTURE OF RIGHT HIP: Status: ACTIVE | Noted: 2022-02-11

## 2022-02-11 LAB — SARS-COV-2, NAAT: NOT DETECTED

## 2022-02-11 PROCEDURE — 6360000002 HC RX W HCPCS: Performed by: ANESTHESIOLOGY

## 2022-02-11 PROCEDURE — 3700000001 HC ADD 15 MINUTES (ANESTHESIA): Performed by: ORTHOPAEDIC SURGERY

## 2022-02-11 PROCEDURE — 7100000001 HC PACU RECOVERY - ADDTL 15 MIN: Performed by: ORTHOPAEDIC SURGERY

## 2022-02-11 PROCEDURE — 7100000000 HC PACU RECOVERY - FIRST 15 MIN: Performed by: ORTHOPAEDIC SURGERY

## 2022-02-11 PROCEDURE — A4217 STERILE WATER/SALINE, 500 ML: HCPCS | Performed by: ORTHOPAEDIC SURGERY

## 2022-02-11 PROCEDURE — 2580000003 HC RX 258: Performed by: ORTHOPAEDIC SURGERY

## 2022-02-11 PROCEDURE — C1769 GUIDE WIRE: HCPCS | Performed by: ORTHOPAEDIC SURGERY

## 2022-02-11 PROCEDURE — 6360000002 HC RX W HCPCS: Performed by: ORTHOPAEDIC SURGERY

## 2022-02-11 PROCEDURE — 6370000000 HC RX 637 (ALT 250 FOR IP): Performed by: ORTHOPAEDIC SURGERY

## 2022-02-11 PROCEDURE — 3600000014 HC SURGERY LEVEL 4 ADDTL 15MIN: Performed by: ORTHOPAEDIC SURGERY

## 2022-02-11 PROCEDURE — 2580000003 HC RX 258: Performed by: NURSE ANESTHETIST, CERTIFIED REGISTERED

## 2022-02-11 PROCEDURE — 1200000000 HC SEMI PRIVATE

## 2022-02-11 PROCEDURE — 2500000003 HC RX 250 WO HCPCS: Performed by: NURSE ANESTHETIST, CERTIFIED REGISTERED

## 2022-02-11 PROCEDURE — C1713 ANCHOR/SCREW BN/BN,TIS/BN: HCPCS | Performed by: ORTHOPAEDIC SURGERY

## 2022-02-11 PROCEDURE — 6360000002 HC RX W HCPCS: Performed by: NURSE ANESTHETIST, CERTIFIED REGISTERED

## 2022-02-11 PROCEDURE — 3600000004 HC SURGERY LEVEL 4 BASE: Performed by: ORTHOPAEDIC SURGERY

## 2022-02-11 PROCEDURE — 2720000010 HC SURG SUPPLY STERILE: Performed by: ORTHOPAEDIC SURGERY

## 2022-02-11 PROCEDURE — 87635 SARS-COV-2 COVID-19 AMP PRB: CPT

## 2022-02-11 PROCEDURE — 2709999900 HC NON-CHARGEABLE SUPPLY: Performed by: ORTHOPAEDIC SURGERY

## 2022-02-11 PROCEDURE — 0QS806Z REPOSITION RIGHT FEMORAL SHAFT WITH INTRAMEDULLARY INTERNAL FIXATION DEVICE, OPEN APPROACH: ICD-10-PCS | Performed by: ORTHOPAEDIC SURGERY

## 2022-02-11 PROCEDURE — 3209999900 FLUORO FOR SURGICAL PROCEDURES

## 2022-02-11 PROCEDURE — 3700000000 HC ANESTHESIA ATTENDED CARE: Performed by: ORTHOPAEDIC SURGERY

## 2022-02-11 PROCEDURE — 73502 X-RAY EXAM HIP UNI 2-3 VIEWS: CPT

## 2022-02-11 PROCEDURE — 27245 TREAT THIGH FRACTURE: CPT | Performed by: PHYSICIAN ASSISTANT

## 2022-02-11 PROCEDURE — 27245 TREAT THIGH FRACTURE: CPT | Performed by: ORTHOPAEDIC SURGERY

## 2022-02-11 PROCEDURE — 2500000003 HC RX 250 WO HCPCS: Performed by: ORTHOPAEDIC SURGERY

## 2022-02-11 DEVICE — SCREW BNE L85MM DIA10.35MM G TI CANN PERF FOR PROX FEM: Type: IMPLANTABLE DEVICE | Site: HIP | Status: FUNCTIONAL

## 2022-02-11 DEVICE — SCREW BNE LCK 5X30 MM W/ T25 STARDRV FOR IM NAIL TI STRL: Type: IMPLANTABLE DEVICE | Site: HIP | Status: FUNCTIONAL

## 2022-02-11 DEVICE — IMPLANTABLE DEVICE: Type: IMPLANTABLE DEVICE | Site: HIP | Status: FUNCTIONAL

## 2022-02-11 RX ORDER — BUPIVACAINE HYDROCHLORIDE 5 MG/ML
INJECTION, SOLUTION EPIDURAL; INTRACAUDAL
Status: COMPLETED | OUTPATIENT
Start: 2022-02-11 | End: 2022-02-11

## 2022-02-11 RX ORDER — OXYCODONE HYDROCHLORIDE AND ACETAMINOPHEN 5; 325 MG/1; MG/1
1 TABLET ORAL
Status: DISCONTINUED | OUTPATIENT
Start: 2022-02-11 | End: 2022-02-11 | Stop reason: HOSPADM

## 2022-02-11 RX ORDER — ROCURONIUM BROMIDE 10 MG/ML
INJECTION, SOLUTION INTRAVENOUS PRN
Status: DISCONTINUED | OUTPATIENT
Start: 2022-02-11 | End: 2022-02-11 | Stop reason: SDUPTHER

## 2022-02-11 RX ORDER — LIDOCAINE HYDROCHLORIDE 20 MG/ML
INJECTION, SOLUTION EPIDURAL; INFILTRATION; INTRACAUDAL; PERINEURAL PRN
Status: DISCONTINUED | OUTPATIENT
Start: 2022-02-11 | End: 2022-02-11 | Stop reason: SDUPTHER

## 2022-02-11 RX ORDER — MIDAZOLAM HYDROCHLORIDE 1 MG/ML
INJECTION INTRAMUSCULAR; INTRAVENOUS PRN
Status: DISCONTINUED | OUTPATIENT
Start: 2022-02-11 | End: 2022-02-11 | Stop reason: SDUPTHER

## 2022-02-11 RX ORDER — OXYCODONE HYDROCHLORIDE 10 MG/1
10 TABLET ORAL EVERY 4 HOURS PRN
Status: DISCONTINUED | OUTPATIENT
Start: 2022-02-11 | End: 2022-02-13 | Stop reason: HOSPADM

## 2022-02-11 RX ORDER — FENTANYL CITRATE 50 UG/ML
50 INJECTION, SOLUTION INTRAMUSCULAR; INTRAVENOUS EVERY 5 MIN PRN
Status: DISCONTINUED | OUTPATIENT
Start: 2022-02-11 | End: 2022-02-11 | Stop reason: HOSPADM

## 2022-02-11 RX ORDER — GLYCOPYRROLATE 0.2 MG/ML
INJECTION INTRAMUSCULAR; INTRAVENOUS PRN
Status: DISCONTINUED | OUTPATIENT
Start: 2022-02-11 | End: 2022-02-11 | Stop reason: SDUPTHER

## 2022-02-11 RX ORDER — PROPOFOL 10 MG/ML
INJECTION, EMULSION INTRAVENOUS PRN
Status: DISCONTINUED | OUTPATIENT
Start: 2022-02-11 | End: 2022-02-11 | Stop reason: SDUPTHER

## 2022-02-11 RX ORDER — SODIUM CHLORIDE 0.9 % (FLUSH) 0.9 %
5-40 SYRINGE (ML) INJECTION EVERY 12 HOURS SCHEDULED
Status: DISCONTINUED | OUTPATIENT
Start: 2022-02-11 | End: 2022-02-11 | Stop reason: HOSPADM

## 2022-02-11 RX ORDER — SODIUM CHLORIDE 0.9 % (FLUSH) 0.9 %
5-40 SYRINGE (ML) INJECTION EVERY 12 HOURS SCHEDULED
Status: DISCONTINUED | OUTPATIENT
Start: 2022-02-11 | End: 2022-02-13 | Stop reason: HOSPADM

## 2022-02-11 RX ORDER — OXYBUTYNIN CHLORIDE 10 MG/1
10 TABLET, EXTENDED RELEASE ORAL DAILY
Status: DISCONTINUED | OUTPATIENT
Start: 2022-02-12 | End: 2022-02-13 | Stop reason: HOSPADM

## 2022-02-11 RX ORDER — ONDANSETRON 2 MG/ML
4 INJECTION INTRAMUSCULAR; INTRAVENOUS EVERY 6 HOURS PRN
Status: DISCONTINUED | OUTPATIENT
Start: 2022-02-11 | End: 2022-02-13 | Stop reason: HOSPADM

## 2022-02-11 RX ORDER — MAGNESIUM HYDROXIDE 1200 MG/15ML
LIQUID ORAL CONTINUOUS PRN
Status: COMPLETED | OUTPATIENT
Start: 2022-02-11 | End: 2022-02-11

## 2022-02-11 RX ORDER — KETAMINE HCL IN NACL, ISO-OSM 100MG/10ML
SYRINGE (ML) INJECTION PRN
Status: DISCONTINUED | OUTPATIENT
Start: 2022-02-11 | End: 2022-02-11 | Stop reason: SDUPTHER

## 2022-02-11 RX ORDER — LISINOPRIL 5 MG/1
5 TABLET ORAL 2 TIMES DAILY
Status: DISCONTINUED | OUTPATIENT
Start: 2022-02-11 | End: 2022-02-13 | Stop reason: HOSPADM

## 2022-02-11 RX ORDER — SUCCINYLCHOLINE/SOD CL,ISO/PF 200MG/10ML
SYRINGE (ML) INTRAVENOUS PRN
Status: DISCONTINUED | OUTPATIENT
Start: 2022-02-11 | End: 2022-02-11 | Stop reason: SDUPTHER

## 2022-02-11 RX ORDER — POLYETHYLENE GLYCOL 3350 17 G/17G
17 POWDER, FOR SOLUTION ORAL DAILY PRN
Status: DISCONTINUED | OUTPATIENT
Start: 2022-02-11 | End: 2022-02-13 | Stop reason: HOSPADM

## 2022-02-11 RX ORDER — CARVEDILOL 3.12 MG/1
3.12 TABLET ORAL 2 TIMES DAILY WITH MEALS
Status: DISCONTINUED | OUTPATIENT
Start: 2022-02-11 | End: 2022-02-13 | Stop reason: HOSPADM

## 2022-02-11 RX ORDER — DIPHENHYDRAMINE HYDROCHLORIDE 50 MG/ML
12.5 INJECTION INTRAMUSCULAR; INTRAVENOUS
Status: DISCONTINUED | OUTPATIENT
Start: 2022-02-11 | End: 2022-02-11 | Stop reason: HOSPADM

## 2022-02-11 RX ORDER — ONDANSETRON 2 MG/ML
4 INJECTION INTRAMUSCULAR; INTRAVENOUS
Status: DISCONTINUED | OUTPATIENT
Start: 2022-02-11 | End: 2022-02-11 | Stop reason: HOSPADM

## 2022-02-11 RX ORDER — TIZANIDINE 4 MG/1
2 TABLET ORAL 3 TIMES DAILY PRN
Status: DISCONTINUED | OUTPATIENT
Start: 2022-02-11 | End: 2022-02-13 | Stop reason: HOSPADM

## 2022-02-11 RX ORDER — MEPERIDINE HYDROCHLORIDE 25 MG/ML
12.5 INJECTION INTRAMUSCULAR; INTRAVENOUS; SUBCUTANEOUS EVERY 5 MIN PRN
Status: DISCONTINUED | OUTPATIENT
Start: 2022-02-11 | End: 2022-02-11 | Stop reason: HOSPADM

## 2022-02-11 RX ORDER — SODIUM CHLORIDE 0.9 % (FLUSH) 0.9 %
5-40 SYRINGE (ML) INJECTION PRN
Status: DISCONTINUED | OUTPATIENT
Start: 2022-02-11 | End: 2022-02-13 | Stop reason: HOSPADM

## 2022-02-11 RX ORDER — ONDANSETRON 2 MG/ML
INJECTION INTRAMUSCULAR; INTRAVENOUS PRN
Status: DISCONTINUED | OUTPATIENT
Start: 2022-02-11 | End: 2022-02-11 | Stop reason: SDUPTHER

## 2022-02-11 RX ORDER — ONDANSETRON 4 MG/1
4 TABLET, ORALLY DISINTEGRATING ORAL EVERY 8 HOURS PRN
Status: DISCONTINUED | OUTPATIENT
Start: 2022-02-11 | End: 2022-02-13 | Stop reason: HOSPADM

## 2022-02-11 RX ORDER — FUROSEMIDE 40 MG/1
40 TABLET ORAL DAILY
Status: DISCONTINUED | OUTPATIENT
Start: 2022-02-12 | End: 2022-02-13 | Stop reason: HOSPADM

## 2022-02-11 RX ORDER — SODIUM CHLORIDE 9 MG/ML
25 INJECTION, SOLUTION INTRAVENOUS PRN
Status: DISCONTINUED | OUTPATIENT
Start: 2022-02-11 | End: 2022-02-11 | Stop reason: HOSPADM

## 2022-02-11 RX ORDER — ATORVASTATIN CALCIUM 20 MG/1
20 TABLET, FILM COATED ORAL NIGHTLY
Status: DISCONTINUED | OUTPATIENT
Start: 2022-02-11 | End: 2022-02-13 | Stop reason: HOSPADM

## 2022-02-11 RX ORDER — SODIUM CHLORIDE 0.9 % (FLUSH) 0.9 %
5-40 SYRINGE (ML) INJECTION PRN
Status: DISCONTINUED | OUTPATIENT
Start: 2022-02-11 | End: 2022-02-11 | Stop reason: HOSPADM

## 2022-02-11 RX ORDER — FENTANYL CITRATE 50 UG/ML
INJECTION, SOLUTION INTRAMUSCULAR; INTRAVENOUS PRN
Status: DISCONTINUED | OUTPATIENT
Start: 2022-02-11 | End: 2022-02-11 | Stop reason: SDUPTHER

## 2022-02-11 RX ORDER — FENTANYL CITRATE 50 UG/ML
25 INJECTION, SOLUTION INTRAMUSCULAR; INTRAVENOUS EVERY 5 MIN PRN
Status: DISCONTINUED | OUTPATIENT
Start: 2022-02-11 | End: 2022-02-11 | Stop reason: HOSPADM

## 2022-02-11 RX ORDER — DIPHENHYDRAMINE HCL 25 MG
25 TABLET ORAL EVERY 6 HOURS PRN
Status: DISCONTINUED | OUTPATIENT
Start: 2022-02-11 | End: 2022-02-13 | Stop reason: HOSPADM

## 2022-02-11 RX ORDER — LORAZEPAM 2 MG/ML
0.5 INJECTION INTRAMUSCULAR ONCE
Status: COMPLETED | OUTPATIENT
Start: 2022-02-11 | End: 2022-02-11

## 2022-02-11 RX ORDER — DEXAMETHASONE SODIUM PHOSPHATE 4 MG/ML
INJECTION, SOLUTION INTRA-ARTICULAR; INTRALESIONAL; INTRAMUSCULAR; INTRAVENOUS; SOFT TISSUE PRN
Status: DISCONTINUED | OUTPATIENT
Start: 2022-02-11 | End: 2022-02-11 | Stop reason: SDUPTHER

## 2022-02-11 RX ORDER — SODIUM CHLORIDE 9 MG/ML
25 INJECTION, SOLUTION INTRAVENOUS PRN
Status: DISCONTINUED | OUTPATIENT
Start: 2022-02-11 | End: 2022-02-13 | Stop reason: HOSPADM

## 2022-02-11 RX ORDER — LABETALOL HYDROCHLORIDE 5 MG/ML
5 INJECTION, SOLUTION INTRAVENOUS EVERY 10 MIN PRN
Status: DISCONTINUED | OUTPATIENT
Start: 2022-02-11 | End: 2022-02-11 | Stop reason: HOSPADM

## 2022-02-11 RX ORDER — TIZANIDINE 4 MG/1
4 TABLET ORAL 3 TIMES DAILY PRN
Status: DISCONTINUED | OUTPATIENT
Start: 2022-02-11 | End: 2022-02-11

## 2022-02-11 RX ORDER — ACETAMINOPHEN 500 MG
500 TABLET ORAL EVERY 4 HOURS PRN
Status: DISCONTINUED | OUTPATIENT
Start: 2022-02-11 | End: 2022-02-13 | Stop reason: HOSPADM

## 2022-02-11 RX ORDER — ASPIRIN 81 MG/1
81 TABLET, CHEWABLE ORAL NIGHTLY
Status: DISCONTINUED | OUTPATIENT
Start: 2022-02-12 | End: 2022-02-12

## 2022-02-11 RX ORDER — PROMETHAZINE HYDROCHLORIDE 25 MG/ML
6.25 INJECTION, SOLUTION INTRAMUSCULAR; INTRAVENOUS
Status: DISCONTINUED | OUTPATIENT
Start: 2022-02-11 | End: 2022-02-11 | Stop reason: HOSPADM

## 2022-02-11 RX ORDER — SODIUM CHLORIDE 9 MG/ML
INJECTION, SOLUTION INTRAVENOUS CONTINUOUS PRN
Status: DISCONTINUED | OUTPATIENT
Start: 2022-02-11 | End: 2022-02-11 | Stop reason: SDUPTHER

## 2022-02-11 RX ORDER — OXYCODONE HYDROCHLORIDE 5 MG/1
5 TABLET ORAL EVERY 4 HOURS PRN
Status: DISCONTINUED | OUTPATIENT
Start: 2022-02-11 | End: 2022-02-13 | Stop reason: HOSPADM

## 2022-02-11 RX ADMIN — SODIUM CHLORIDE, PRESERVATIVE FREE 5 ML: 5 INJECTION INTRAVENOUS at 21:33

## 2022-02-11 RX ADMIN — FENTANYL CITRATE 25 MCG: 50 INJECTION INTRAMUSCULAR; INTRAVENOUS at 13:22

## 2022-02-11 RX ADMIN — OXYCODONE HYDROCHLORIDE 10 MG: 10 TABLET ORAL at 20:42

## 2022-02-11 RX ADMIN — PROPOFOL 150 MG: 10 INJECTION, EMULSION INTRAVENOUS at 13:22

## 2022-02-11 RX ADMIN — ROCURONIUM BROMIDE 50 MG: 10 INJECTION INTRAVENOUS at 13:26

## 2022-02-11 RX ADMIN — FENTANYL CITRATE 25 MCG: 50 INJECTION INTRAMUSCULAR; INTRAVENOUS at 13:20

## 2022-02-11 RX ADMIN — HYDROMORPHONE HYDROCHLORIDE 0.5 MG: 1 INJECTION, SOLUTION INTRAMUSCULAR; INTRAVENOUS; SUBCUTANEOUS at 15:02

## 2022-02-11 RX ADMIN — CEFAZOLIN 2000 MG: 10 INJECTION, POWDER, FOR SOLUTION INTRAVENOUS at 13:38

## 2022-02-11 RX ADMIN — GLYCOPYRROLATE 0.1 MG: 0.2 INJECTION, SOLUTION INTRAMUSCULAR; INTRAVENOUS at 13:45

## 2022-02-11 RX ADMIN — SUGAMMADEX 200 MG: 100 INJECTION, SOLUTION INTRAVENOUS at 14:08

## 2022-02-11 RX ADMIN — LIDOCAINE HYDROCHLORIDE 100 MG: 20 INJECTION, SOLUTION EPIDURAL; INFILTRATION; INTRACAUDAL; PERINEURAL at 13:22

## 2022-02-11 RX ADMIN — TIZANIDINE 2 MG: 4 TABLET ORAL at 22:13

## 2022-02-11 RX ADMIN — DEXAMETHASONE SODIUM PHOSPHATE 10 MG: 4 INJECTION, SOLUTION INTRAMUSCULAR; INTRAVENOUS at 13:27

## 2022-02-11 RX ADMIN — HYDROMORPHONE HYDROCHLORIDE 0.5 MG: 1 INJECTION, SOLUTION INTRAMUSCULAR; INTRAVENOUS; SUBCUTANEOUS at 14:33

## 2022-02-11 RX ADMIN — HYDROMORPHONE HYDROCHLORIDE 0.5 MG: 1 INJECTION, SOLUTION INTRAMUSCULAR; INTRAVENOUS; SUBCUTANEOUS at 14:50

## 2022-02-11 RX ADMIN — LORAZEPAM 0.5 MG: 2 INJECTION INTRAMUSCULAR; INTRAVENOUS at 14:59

## 2022-02-11 RX ADMIN — ATORVASTATIN CALCIUM 20 MG: 20 TABLET, FILM COATED ORAL at 21:32

## 2022-02-11 RX ADMIN — FENTANYL CITRATE 50 MCG: 50 INJECTION INTRAMUSCULAR; INTRAVENOUS at 14:09

## 2022-02-11 RX ADMIN — ONDANSETRON 4 MG: 2 INJECTION INTRAMUSCULAR; INTRAVENOUS at 14:08

## 2022-02-11 RX ADMIN — Medication 10 MG: at 13:38

## 2022-02-11 RX ADMIN — Medication 20 MG: at 13:22

## 2022-02-11 RX ADMIN — SODIUM CHLORIDE: 9 INJECTION, SOLUTION INTRAVENOUS at 12:53

## 2022-02-11 RX ADMIN — Medication 140 MG: at 13:22

## 2022-02-11 RX ADMIN — CARVEDILOL 3.12 MG: 3.12 TABLET, FILM COATED ORAL at 21:32

## 2022-02-11 RX ADMIN — MIDAZOLAM 2 MG: 1 INJECTION INTRAMUSCULAR; INTRAVENOUS at 13:20

## 2022-02-11 RX ADMIN — HYDROMORPHONE HYDROCHLORIDE 0.5 MG: 1 INJECTION, SOLUTION INTRAMUSCULAR; INTRAVENOUS; SUBCUTANEOUS at 14:27

## 2022-02-11 ASSESSMENT — PULMONARY FUNCTION TESTS
PIF_VALUE: 0
PIF_VALUE: 21
PIF_VALUE: 22
PIF_VALUE: 17
PIF_VALUE: 4
PIF_VALUE: 5
PIF_VALUE: 0
PIF_VALUE: 22
PIF_VALUE: 2
PIF_VALUE: 22
PIF_VALUE: 17
PIF_VALUE: 23
PIF_VALUE: 17
PIF_VALUE: 21
PIF_VALUE: 0
PIF_VALUE: 22
PIF_VALUE: 1
PIF_VALUE: 22
PIF_VALUE: 18
PIF_VALUE: 22
PIF_VALUE: 21
PIF_VALUE: 21
PIF_VALUE: 20
PIF_VALUE: 21
PIF_VALUE: 18
PIF_VALUE: 16
PIF_VALUE: 21
PIF_VALUE: 21
PIF_VALUE: 1
PIF_VALUE: 13
PIF_VALUE: 22
PIF_VALUE: 21
PIF_VALUE: 22
PIF_VALUE: 17
PIF_VALUE: 21
PIF_VALUE: 22
PIF_VALUE: 2
PIF_VALUE: 1
PIF_VALUE: 2
PIF_VALUE: 2
PIF_VALUE: 22
PIF_VALUE: 17
PIF_VALUE: 17
PIF_VALUE: 21
PIF_VALUE: 1

## 2022-02-11 ASSESSMENT — PAIN DESCRIPTION - DESCRIPTORS
DESCRIPTORS: CONSTANT;DISCOMFORT;CRAMPING
DESCRIPTORS: SHARP;DISCOMFORT
DESCRIPTORS: SHARP
DESCRIPTORS: SHARP

## 2022-02-11 ASSESSMENT — PAIN DESCRIPTION - PROGRESSION
CLINICAL_PROGRESSION: GRADUALLY IMPROVING
CLINICAL_PROGRESSION: NOT CHANGED
CLINICAL_PROGRESSION: NOT CHANGED

## 2022-02-11 ASSESSMENT — PAIN DESCRIPTION - LOCATION
LOCATION: LEG
LOCATION: HIP
LOCATION: HIP

## 2022-02-11 ASSESSMENT — PAIN - FUNCTIONAL ASSESSMENT
PAIN_FUNCTIONAL_ASSESSMENT: PREVENTS OR INTERFERES SOME ACTIVE ACTIVITIES AND ADLS
PAIN_FUNCTIONAL_ASSESSMENT: 0-10

## 2022-02-11 ASSESSMENT — PAIN SCALES - GENERAL
PAINLEVEL_OUTOF10: 8
PAINLEVEL_OUTOF10: 10
PAINLEVEL_OUTOF10: 0
PAINLEVEL_OUTOF10: 8
PAINLEVEL_OUTOF10: 8
PAINLEVEL_OUTOF10: 4
PAINLEVEL_OUTOF10: 8

## 2022-02-11 ASSESSMENT — PAIN DESCRIPTION - FREQUENCY
FREQUENCY: CONTINUOUS

## 2022-02-11 ASSESSMENT — PAIN DESCRIPTION - PAIN TYPE
TYPE: SURGICAL PAIN

## 2022-02-11 ASSESSMENT — PAIN DESCRIPTION - ORIENTATION
ORIENTATION: RIGHT

## 2022-02-11 ASSESSMENT — PAIN DESCRIPTION - ONSET
ONSET: ON-GOING

## 2022-02-11 ASSESSMENT — LIFESTYLE VARIABLES: SMOKING_STATUS: 0

## 2022-02-11 NOTE — OP NOTE
Patient: Jared Parsons  YOB: 1950  MRN: [id number]    Date of Procedure: 2/11/2022      Pre-Op Diagnosis: Right intertrochanteric femur fracture, non-displaced     Post-Op Diagnosis: Same       Procedure(s):  Right femur cephalomedullary nail     Surgeon(s):  Tamiko Johnson MD     First Assistant: SONU Norman     Anesthesia: General     Estimated Blood Loss (mL): less than 640      Complications: None     Specimens: None     Implants:  Synthes TFNA, 10 x 170 mm  Lag screw 85 mm  Distal interlocking screw x 1     Indications: This is a 73 y.o. female who sustained a right intertrochanteric femur fracture from a mechanical fall. This was a nondisplaced fracture diagnosed through MRI as there was no obvious fracture on radiograph studies. We discussed the diagnosis and treatment options and I recommended surgical fixation. We went over the risks and complications of surgery including bleeding, infection, decreased ROM, continued pain, instability, fracture, dislocation, neurovascular injury, post op cognitive disorder, leg length discrepancy, DVT, pulmonary embolism and need for further surgical procedures. Informed consent for surgery was obtained. Details: The patient was seen in the preoperative holding area where the site of surgery was marked and informed consent was confirmed. The patient was brought back to the operating room by OR personnel. General anesthesia was administered. The patient was positioned supine on the [place] table. A final and formal timeout was then performed which confirmed the correct patient, correct position, and correct site of surgery. Fluoroscopic imaging was used to confirm appropriate reduction of the fracture in both AP and lateral planes. The right lower extremity was then prepped and draped in a standard and sterile fashion. IV antibiotics were administered within 1 hour of skin incision.      A longitudinal incision was made in the lateral thigh just proximal to the greater trochanter. Sharp dissection was carried down through skin and subcutaneous tissue. The IT band was incised longitudinally to access the tip of the greater trochanter. A starting awl was placed just medial to the tip of the greater trochanter. Fluoroscopic imaging was used to assess the starting point in the AP and lateral planes. Once an acceptable starting point was obtained, the awl was inserted into the bone. A ball-tipped guidewire was inserted through the starting awl down the femoral shaft. The ball-tipped guidewire was inserted under fluoroscopic imaging and was confirmed to be intramedullary. A tissue protector was inserted over the guidewire and a starting reamer was used to open up the starting point in the tip the greater trochanter. A 10 x 170 mm Synthes TFNA was inserted. Once the nail was in acceptable position, a stab incision was made in the lateral thigh to insert a wire through the femoral neck into the femoral head. Fluoroscopic imaging was used in the AP and lateral planes to guide this wire to the appropriate position. Once this position was reached, the femoral neck and head were reamed to accept an 85 mm lag screw. The lag screw was inserted. The set screw was then fastened. One distal interlocking screw was then placed. The jig was removed. Final fluoroscopic images were taken of the entire construct and showed acceptable alignment of the fracture and hardware. The wounds were then thoroughly irrigated. A layered closure was performed of the IT band, subcutaneous tissue, and skin. Dressings were applied. The patient was awakened from anesthesia and went to the postoperative care unit in stable condition. SONU Christopher was essential in patient positioning, surgical assistance during the procedure, and in wound closure.     Juan Baldwin MD  2/11/2022

## 2022-02-11 NOTE — ANESTHESIA PRE PROCEDURE
Department of Anesthesiology  Preprocedure Note       Name:  Chapis Parks   Age:  73 y.o.  :  1950                                          MRN:  [id number]         Date:  2022      Surgeon: Mary Tello):  Isis Kim MD    Procedure: Procedure(s):  OPEN REDUCTION INTERNAL FIXATION WITH NAIL RIGHT HIP    Medications prior to admission:   Prior to Admission medications    Medication Sig Start Date End Date Taking? Authorizing Provider   diphenhydrAMINE (BENADRYL ALLERGY) 25 MG tablet Take 25 mg by mouth as needed for Itching   Yes Historical Provider, MD   HYDROcodone-acetaminophen (NORCO) 7.5-325 MG per tablet Take 1 tablet by mouth every 8 hours as needed for Pain for up to 30 days.  Intended supply: 30 days 2/10/22 3/12/22 Yes Sidney Gurrola MD   tiZANidine (ZANAFLEX) 4 MG tablet Take 1 tablet by mouth 3 times daily for 20 days  Patient taking differently: Take 2 mg by mouth 2 times daily  22 Yes Isis Kim MD   atorvastatin (LIPITOR) 20 MG tablet TAKE ONE TABLET BY MOUTH DAILY  Patient taking differently: Take 20 mg by mouth at bedtime  21  Yes Hari Ayers MD   carvedilol (COREG) 3.125 MG tablet TAKE ONE TABLET BY MOUTH TWICE A DAY WITH A MEAL  Patient taking differently: Take 3.125 mg by mouth at bedtime  21  Yes Magalie Newman MD   furosemide (LASIX) 40 MG tablet TAKE ONE TABLET BY MOUTH DAILY  Patient taking differently: Take 40 mg by mouth every evening  12/3/21  Yes Magalie Newman MD   acetaminophen (TYLENOL) 325 MG tablet Take 650 mg by mouth every 4 hours as needed   Yes Historical Provider, MD   diclofenac (CATAFLAM) 50 MG tablet TAKE ONE TABLET BY MOUTH TWICE A DAY AS NEEDED FOR PAIN 21  Yes Sidney Gurrola MD   aspirin 81 MG tablet Take 81 mg by mouth at bedtime    Yes Historical Provider, MD   oxybutynin (DITROPAN-XL) 10 MG extended release tablet TAKE ONE TABLET BY MOUTH DAILY  Patient taking differently: Take 10 mg by mouth as needed  21   Mabel Newton Savannah Matute MD   lisinopril (PRINIVIL;ZESTRIL) 5 MG tablet TAKE ONE TABLET BY MOUTH TWICE A DAY 12/6/21   Judah Askew MD       Current medications:    Current Facility-Administered Medications   Medication Dose Route Frequency Provider Last Rate Last Admin    sodium chloride flush 0.9 % injection 5-40 mL  5-40 mL IntraVENous 2 times per day Adan Graves MD        sodium chloride flush 0.9 % injection 5-40 mL  5-40 mL IntraVENous PRN Adan Graves MD        0.9 % sodium chloride infusion  25 mL IntraVENous PRN Adan Graves MD        ceFAZolin (ANCEF) 2000 mg in dextrose 5 % 100 mL IVPB  2,000 mg IntraVENous Once Bernabe Ivey MD           Allergies:  No Known Allergies    Problem List:    Patient Active Problem List   Diagnosis Code    Mixed hyperlipidemia E78.2    Anxiety F41.9    Primary osteoarthritis involving multiple joints M89.49    Essential hypertension I10    Restless legs syndrome (RLS) G25.81    Cardiomyopathy ([facility] [address] I42.9    Chronic pain syndrome G89.4    Coronary artery disease involving native coronary artery of native heart without angina pectoris I25.10    Mixed incontinence N39.46    Paroxysmal atrial fibrillation (HCC) I48.0    Chronic low back pain without sciatica M54.50, G89.29       Past Medical History:        Diagnosis Date    Anxiety     Arthritis     Broken nose     CHF (congestive heart failure) ([facility] [address]     DJD (degenerative joint disease)     Essential hypertension 6/8/2012    Hyperlipidemia     Hypertension 6/8/2012    Mixed hyperlipidemia     Primary osteoarthritis involving multiple joints     Restless legs syndrome (RLS) 11/24/2014       Past Surgical History:        Procedure Laterality Date    BREAST ENHANCEMENT SURGERY      CATARACT REMOVAL WITH IMPLANT Bilateral 2015    WRIST FRACTURE SURGERY  4/10/12    Right       Social History:    Social History     Tobacco Use    Smoking status: Former Smoker     Packs/day: 1.50     Years: 25.00     Pack years: 37.50     Quit date: 10/27/2004     Years since quittin.3    Smokeless tobacco: Never Used   Substance Use Topics    Alcohol use: No                                Counseling given: Not Answered      Vital Signs (Current):   Vitals:    02/10/22 1119 22 1200   BP:  (!) 147/76   Pulse:  71   Resp:  16   Temp:  97.1 °F (36.2 °C)   TempSrc:  Temporal   SpO2:  97%   Weight: 157 lb (71.2 kg) 150 lb 9.6 oz (68.3 kg)   Height: 5' 1\" (1.549 m) 5' 1\" (1.549 m)                                              BP Readings from Last 3 Encounters:   22 (!) 147/76   21 120/72   21 110/70       NPO Status: Time of last liquid consumption:                         Time of last solid consumption:                         Date of last liquid consumption: 02/10/22                        Date of last solid food consumption: 02/10/22    BMI:   Wt Readings from Last 3 Encounters:   22 150 lb 9.6 oz (68.3 kg)   02/10/22 148 lb (67.1 kg)   22 148 lb (67.1 kg)     Body mass index is 28.46 kg/m². CBC:   Lab Results   Component Value Date    WBC 8.0 2019    RBC 4.28 2019    HGB 13.8 2022    HCT 39.3 2019    MCV 91.8 2019    RDW 13.3 2019     2019       CMP:   Lab Results   Component Value Date     2022    K 3.9 2022    CL 97 2022    CO2 28 2022    BUN 9 2022    CREATININE 0.8 2022    GFRAA >60 2022    GFRAA >60 2013    AGRATIO 1.7 2021    LABGLOM >60 2022    GLUCOSE 111 2022    PROT 6.8 2021    PROT 7.2 2012    CALCIUM 9.2 2022    BILITOT 0.4 2021    ALKPHOS 173 2022    AST 24 2021    ALT 25 2021       POC Tests: No results for input(s): POCGLU, POCNA, POCK, POCCL, POCBUN, POCHEMO, POCHCT in the last 72 hours.     Coags:   Lab Results   Component Value Date    PROTIME 9.6 2015    INR 0.89 2015    APTT 31.2 2015       HCG (If Applicable): No results found for: PREGTESTUR, PREGSERUM, HCG, HCGQUANT     ABGs: No results found for: PHART, PO2ART, LJH9OGA, VZS5ZOD, BEART, J3AIMQPB     Type & Screen (If Applicable):  No results found for: LABABO, LABRH    Drug/Infectious Status (If Applicable):  No results found for: HIV, HEPCAB    COVID-19 Screening (If Applicable):   Lab Results   Component Value Date    COVID19 Not Detected 02/11/2022           Anesthesia Evaluation  Patient summary reviewed no history of anesthetic complications:   Airway: Mallampati: II  TM distance: >3 FB   Neck ROM: full  Mouth opening: > = 3 FB Dental:    (+) partials      Pulmonary:Negative Pulmonary ROS       (-) sleep apnea and not a current smoker                           Cardiovascular:    (+) hypertension:, CAD:, dysrhythmias (paroxysmal A-fib, in NSR now): atrial fibrillation, CHF:, hyperlipidemia               ROS comment: ECHO 1/22: EF 55%     Neuro/Psych:   Negative Neuro/Psych ROS              GI/Hepatic/Renal:        (-) hepatitis, liver disease and no renal disease       Endo/Other: Negative Endo/Other ROS       (-) diabetes mellitus, hypothyroidism, hyperthyroidism               Abdominal:             Vascular: negative vascular ROS. Other Findings:             Anesthesia Plan      general     ASA 3       Induction: intravenous. MIPS: Postoperative opioids intended and Prophylactic antiemetics administered. Anesthetic plan and risks discussed with patient. Plan discussed with CRNA. This pre-anesthesia assessment may be used as a history and physical.    DOS STAFF ADDENDUM:    Pt seen and examined, chart reviewed (including anesthesia, drug and allergy history). No interval changes to history and physical examination. Anesthetic plan, risks, benefits, alternatives, and personnel involved discussed with patient. Patient verbalized an understanding and agrees to proceed.       Suzanne Parker MD  February 11, 2022  12:46 PM

## 2022-02-11 NOTE — ANESTHESIA POSTPROCEDURE EVALUATION
Department of Anesthesiology  Postprocedure Note    Patient: Lisette Boo  MRN: [id number]  YOB: 1950  Date of evaluation: 2/11/2022  Time:  5:42 PM     Procedure Summary     Date: 02/11/22 Room / Location: [address] [place] [address] / [facility]    Anesthesia Start: 8004 Anesthesia Stop: 6227    Procedure: OPEN REDUCTION INTERNAL FIXATION WITH NAIL RIGHT HIP (Right Hip) Diagnosis: (RIGHT HIP FRACTURE)    Surgeons: Kaity Ann MD Responsible Provider: Terell Alford MD    Anesthesia Type: general ASA Status: 3          Anesthesia Type: general    Gianni Phase I: Gianni Score: 8    Gianni Phase II:      Last vitals: Reviewed and per EMR flowsheets.        Anesthesia Post Evaluation    Patient location during evaluation: PACU  Patient participation: complete - patient participated  Level of consciousness: awake and alert  Pain score: 5  Nausea & Vomiting: no nausea  Complications: no  Cardiovascular status: hemodynamically stable  Respiratory status: acceptable  Hydration status: stable

## 2022-02-11 NOTE — H&P
Update History & Physical    The patient's History and Physical of February 11, 2022 (anesthesia note) was reviewed with the patient and I examined the patient. There was no change. The surgical site was confirmed by the patient and me. Plan: The risks, benefits, expected outcome, and alternative to the recommended procedure have been discussed with the patient. Patient understands and wants to proceed with the procedure.      Electronically signed by Juan Baldwin MD on 2/11/2022 at 12:48 PM

## 2022-02-11 NOTE — PROGRESS NOTES
Patient came up to the floor with no orders. Cici Rueda, PACU RN, stated that Dr. Farrukh Tello would place orders after he was out of surgery. Patient still has no orders. This nurse called ortho on-call and spoke with Dr. Clovis Harper. Dr. Clovis Harper stated that he would look into it.   Electronically signed by Vincenzo Sorto RN on 2/11/2022

## 2022-02-11 NOTE — PROGRESS NOTES
Patient A&O. Patient resting in bed at this time. Call light within reach. Will continue to monitor and reassess.  Electronically signed by Vincenzo Sorto RN on 2/11/2022

## 2022-02-11 NOTE — PROGRESS NOTES
Pt arrived to floor from PACU via bed. Pt oriented to room, call light, and policies and procedures. Call light within reach. Bed in lowest position, bed alarm on, and wheels locked. Pt verbalized understanding. No complaints, questions or concerns at this time.   Electronically signed by Herb Tejeda RN on 2/11/2022

## 2022-02-11 NOTE — PROGRESS NOTES
To pacu from OR. Pt asleep with oral airway in place. Wakes easily. Airway removed. Pt JAIN to request.  Dressings time 2 to right hip dry and intact. Ice to right hip. Pedal pulses palpable. IV infusing.

## 2022-02-11 NOTE — TELEPHONE ENCOUNTER
AuthHortense Constable [id number]    Date: 02/11/22 thru 02/13/22  Type of SX:  Inpatient  Location: [facility]  CPT: 71329   DX Code: S72.91XA  SX area: Rt hip  Insurance: [employer]

## 2022-02-11 NOTE — PROGRESS NOTES
4 Eyes Admission Assessment     I agree as the admission nurse that 2 RN's have performed a thorough Head to Toe Skin Assessment on the patient. ALL assessment sites listed below have been assessed on admission. Areas assessed by both nurses: yes  [x]   Head, Face, and Ears   [x]   Shoulders, Back, and Chest  [x]   Arms, Elbows, and Hands   [x]   Coccyx, Sacrum, and Ischum  [x]   Legs, Feet, and Heels        Does the Patient have Skin Breakdown?   No         Christiano Prevention initiated:  No   Wound Care Orders initiated:  No      [facility] nurse consulted for Pressure Injury (Stage 3,4, Unstageable, DTI, NWPT, and Complex wounds):  No      Nurse 1 eSignature: Electronically signed by Eunice Govea RN on 2/11/2022       **SHARE this note so that the co-signing nurse is able to place an eSignature**    Nurse 2 eSignature: Electronically signed by Meek Camacho RN on 2/11/22 at 5:59 PM EST

## 2022-02-12 PROCEDURE — 97166 OT EVAL MOD COMPLEX 45 MIN: CPT

## 2022-02-12 PROCEDURE — 94761 N-INVAS EAR/PLS OXIMETRY MLT: CPT

## 2022-02-12 PROCEDURE — 97530 THERAPEUTIC ACTIVITIES: CPT

## 2022-02-12 PROCEDURE — 6370000000 HC RX 637 (ALT 250 FOR IP): Performed by: ORTHOPAEDIC SURGERY

## 2022-02-12 PROCEDURE — 97535 SELF CARE MNGMENT TRAINING: CPT

## 2022-02-12 PROCEDURE — 2580000003 HC RX 258: Performed by: ORTHOPAEDIC SURGERY

## 2022-02-12 PROCEDURE — 1200000000 HC SEMI PRIVATE

## 2022-02-12 PROCEDURE — 97116 GAIT TRAINING THERAPY: CPT

## 2022-02-12 PROCEDURE — 97162 PT EVAL MOD COMPLEX 30 MIN: CPT

## 2022-02-12 PROCEDURE — 6360000002 HC RX W HCPCS: Performed by: ORTHOPAEDIC SURGERY

## 2022-02-12 PROCEDURE — 94150 VITAL CAPACITY TEST: CPT

## 2022-02-12 PROCEDURE — 2700000000 HC OXYGEN THERAPY PER DAY

## 2022-02-12 RX ORDER — ASPIRIN 81 MG/1
81 TABLET ORAL 2 TIMES DAILY
Status: DISCONTINUED | OUTPATIENT
Start: 2022-02-12 | End: 2022-02-13 | Stop reason: HOSPADM

## 2022-02-12 RX ORDER — MORPHINE SULFATE 4 MG/ML
4 INJECTION, SOLUTION INTRAMUSCULAR; INTRAVENOUS
Status: DISCONTINUED | OUTPATIENT
Start: 2022-02-12 | End: 2022-02-13 | Stop reason: HOSPADM

## 2022-02-12 RX ORDER — MORPHINE SULFATE 2 MG/ML
2 INJECTION, SOLUTION INTRAMUSCULAR; INTRAVENOUS
Status: DISCONTINUED | OUTPATIENT
Start: 2022-02-12 | End: 2022-02-13 | Stop reason: HOSPADM

## 2022-02-12 RX ADMIN — CEFAZOLIN 2000 MG: 10 INJECTION, POWDER, FOR SOLUTION INTRAVENOUS at 15:59

## 2022-02-12 RX ADMIN — OXYCODONE 5 MG: 5 TABLET ORAL at 21:49

## 2022-02-12 RX ADMIN — ATORVASTATIN CALCIUM 20 MG: 20 TABLET, FILM COATED ORAL at 21:06

## 2022-02-12 RX ADMIN — FUROSEMIDE 40 MG: 40 TABLET ORAL at 08:24

## 2022-02-12 RX ADMIN — SODIUM CHLORIDE, PRESERVATIVE FREE 10 ML: 5 INJECTION INTRAVENOUS at 21:07

## 2022-02-12 RX ADMIN — ASPIRIN 81 MG: 81 TABLET, COATED ORAL at 08:24

## 2022-02-12 RX ADMIN — SODIUM CHLORIDE, PRESERVATIVE FREE 10 ML: 5 INJECTION INTRAVENOUS at 08:33

## 2022-02-12 RX ADMIN — OXYCODONE HYDROCHLORIDE 10 MG: 10 TABLET ORAL at 06:23

## 2022-02-12 RX ADMIN — OXYCODONE HYDROCHLORIDE 10 MG: 10 TABLET ORAL at 15:56

## 2022-02-12 RX ADMIN — ASPIRIN 81 MG: 81 TABLET, COATED ORAL at 21:06

## 2022-02-12 RX ADMIN — CEFAZOLIN 2000 MG: 10 INJECTION, POWDER, FOR SOLUTION INTRAVENOUS at 08:33

## 2022-02-12 RX ADMIN — LISINOPRIL 5 MG: 5 TABLET ORAL at 21:06

## 2022-02-12 RX ADMIN — OXYCODONE HYDROCHLORIDE 10 MG: 10 TABLET ORAL at 10:50

## 2022-02-12 RX ADMIN — CARVEDILOL 3.12 MG: 3.12 TABLET, FILM COATED ORAL at 19:12

## 2022-02-12 RX ADMIN — TIZANIDINE 2 MG: 4 TABLET ORAL at 14:13

## 2022-02-12 RX ADMIN — TIZANIDINE 2 MG: 4 TABLET ORAL at 21:50

## 2022-02-12 ASSESSMENT — PAIN DESCRIPTION - LOCATION
LOCATION: LEG

## 2022-02-12 ASSESSMENT — PAIN DESCRIPTION - PROGRESSION
CLINICAL_PROGRESSION: NOT CHANGED
CLINICAL_PROGRESSION: NOT CHANGED

## 2022-02-12 ASSESSMENT — PAIN DESCRIPTION - DESCRIPTORS
DESCRIPTORS: CONSTANT
DESCRIPTORS: CONSTANT;DISCOMFORT
DESCRIPTORS: CONSTANT

## 2022-02-12 ASSESSMENT — PAIN DESCRIPTION - PAIN TYPE
TYPE: SURGICAL PAIN

## 2022-02-12 ASSESSMENT — PAIN SCALES - GENERAL
PAINLEVEL_OUTOF10: 0
PAINLEVEL_OUTOF10: 0
PAINLEVEL_OUTOF10: 8
PAINLEVEL_OUTOF10: 7
PAINLEVEL_OUTOF10: 0
PAINLEVEL_OUTOF10: 7
PAINLEVEL_OUTOF10: 6

## 2022-02-12 ASSESSMENT — PAIN - FUNCTIONAL ASSESSMENT
PAIN_FUNCTIONAL_ASSESSMENT: PREVENTS OR INTERFERES SOME ACTIVE ACTIVITIES AND ADLS

## 2022-02-12 ASSESSMENT — PAIN DESCRIPTION - FREQUENCY
FREQUENCY: CONTINUOUS

## 2022-02-12 ASSESSMENT — PAIN DESCRIPTION - ONSET
ONSET: ON-GOING

## 2022-02-12 ASSESSMENT — PAIN DESCRIPTION - ORIENTATION
ORIENTATION: RIGHT

## 2022-02-12 NOTE — PROGRESS NOTES
[facility]  Office Visit    Chief Complaint: Right proximal humerus fracture and right hip pain    HPI:  Fransisco Ashraf is a 73 y.o. who is here in follow-up of right shoulder and leg pain following a fall on January 28, 2022. She slipped on the snow and ice and landed on her right side. She was seen at [facility] and was found to have a right proximal humerus fracture. The proximal humerus fracture is being treated nonoperatively. She continued to have right leg pain so an MRI was obtained which does show a nondisplaced intertrochanteric femur fracture. I recommended surgery and she is here today for further discussion of this. Patient Active Problem List   Diagnosis    Mixed hyperlipidemia    Anxiety    Primary osteoarthritis involving multiple joints    Essential hypertension    Restless legs syndrome (RLS)    Cardiomyopathy ([facility]    Chronic pain syndrome    Coronary artery disease involving native coronary artery of native heart without angina pectoris    Mixed incontinence    Paroxysmal atrial fibrillation (HCC)    Chronic low back pain without sciatica    Status post-operative repair of closed fracture of right hip    Fracture of femur, intertrochanteric, closed, right, initial encounter ([facility]       ROS:  Constitutional: denies fever, chills, weight loss  MSK: denies pain in other joints, muscle aches  Neurological: denies numbness, tingling, weakness    Exam:  Height 5' 1\" (1.549 m), weight 148 lb (67.1 kg)    Appearance: sitting in exam room chair, appears to be in no acute distress, awake and alert  Resp: unlabored breathing on room air  Skin: warm, dry and intact with out erythema or significant increased temperature  Neuro: grossly intact both lower extremities. Intact sensation to light touch. Motor exam 4+ to 5/5 in all major motor groups. RLE: Examination demonstrates negative logroll and negative Stinchfield. There is brisk capillary refill. Strength is 5/5 in hamstrings, quads, hip flexors. She did get up and weight-bear and walk in the room. RUE: Ecchymosis and tenderness at the shoulder. Sensation intact light touch in axillary, radial, ulnar, median nerve distributions palpable radial pulse. She demonstrates active wrist flexion and extension. Imaging:  3 views of the right shoulder were performed and interpreted today. There is a proximal humerus fracture and has acceptable alignment. There is no interval callus formation. Right hip MRI was reviewed and shows a nondisplaced intertrochanteric femur fracture. Assessment:  Right proximal humerus fracture  Right hip intertrochanteric femur fracture    Plan:  We discussed the diagnosis and treatment options. She will continue nonoperative management of the right proximal humerus fracture is has maintained acceptable alignment. We discussed the findings of the right hip MRI and I recommended surgical stabilization of her right intertrochanteric femur fracture. We went over the risks and complications of surgery including: bleeding, infection, decreased ROM, continued pain, instability, fracture, dislocation, neurovascular injury, post op cognitive disorder, DVT, pulmonary embolism and need for further surgical procedures. The patient understands these issues and we will see the patient in the operating room. Plan for cephalomedullary nail of the right hip tomorrow. This will be done as an outpatient procedure in which she is having issues postoperatively he will stay 1 night. Total time spent on today's encounter was at least 26 minutes.  This time included reviewing prior notes, radiographs, and lab results when available, reviewing history obtained by medical assistant, performing history and physical exam, reviewing tests/radiographs with the patient, counseling the patient, ordering medications or tests, documentation in the electronic health record, and coordination of care.    This dictation was done with Dragon dictation and may contain mechanical errors related to translation.

## 2022-02-12 NOTE — CARE COORDINATION
INITIAL CASE MANAGEMENT ASSESSMENT    Reviewed chart, met with patient to assess possible discharge needs. Explained Case Management role/services. Living Situation: lives with  of 48 years  ADLs: Independent prior to injury     DME: child's crutch    PT/OT Recs: PT 16/24, OT 12/24     Active Services: None     Transportation:  to transport     Medications: Chauncy Barthel    PCP: Adán Lowry MD      HD/PD: N/A    PLAN/COMMENTS: Home with family support- refusing SNF. Provided home care list and SNF list.    Patient selected [address]. SW sent referral to [facility]. The Plan for Transition of Care is related to the following treatment goals: return home    The Patient  was provided with a choice of provider and agrees   with the discharge plan. [x] Yes [] No    Freedom of choice list was provided with basic dialogue that supports the patient's individualized plan of care/goals, treatment preferences and shares the quality data associated with the providers. [x] Yes [] No    SW/CM provided contact information for patient or family to call with any questions. SW/CM will follow and assist as needed. Provided patient with 14 inch wheel chair with foot rest and cushion. From [employer] supply closet.

## 2022-02-12 NOTE — PROGRESS NOTES
[facility] ([facility]) Orthopaedic Surgery Note    The use of a wheelchair is medically necessary for this patient due to ipsilateral right proximal humerus and right hip fractures. She is unable to use a standard walker due to her nonweightbearing status of her right upper extremity.     Brian Marin MD

## 2022-02-12 NOTE — DISCHARGE INSTR - COC
Continuity of Care Form    Patient Name: Ame Greene   :  1950  MRN:  [id number]    Admit date:  2022  Discharge date:  2022    Code Status Order: Full Code   Advance Directives:   [address] [facility] Documentation       Date/Time Healthcare Directive Type of Healthcare Directive Copy in [address] Agent's Name Healthcare Agent's Phone Number    02/10/22 0656 Yes, patient has an advance directive for healthcare treatment Durable power of  for health care;Living will -- Spouse -JUDY --            Admitting Physician:  Bernabe Ivey MD  PCP: Tico Thao MD    Discharging Nurse: Malaika Bhatia RN   [address] Unit/Room#: F9J-8959/3122-01  Discharging Unit Phone Number: [phone]    Emergency Contact:   Extended Emergency Contact Information  Primary Emergency Contact: Mather  Address: [address] [place] [address] Phone: [phone]  Mobile Phone: [phone]  Relation: Spouse    Past Surgical History:  Past Surgical History:   Procedure Laterality Date    BREAST ENHANCEMENT SURGERY      CATARACT REMOVAL WITH IMPLANT Bilateral 2015    HIP FRACTURE SURGERY Right 2022    OPEN REDUCTION INTERNAL FIXATION WITH NAIL RIGHT HIP performed by Bernabe Ivey MD at [address] [place] [address]  4/10/12    Right       Immunization History:   Immunization History   Administered Date(s) Administered    COVID-19, Ankit Simpson, Primary or Immunocompromised, PF, 100mcg/0.5mL 2021, 2021    Influenza, Quadv, IM, PF (6 mo and older Fluzone, Flulaval, Fluarix, and 3 yrs and older Afluria) 10/26/2016    Influenza, Quadv, adjuvanted, 65 yrs +, IM, PF (Fluad) 2021    Influenza, Triv, inactivated, subunit, adjuvanted, IM (Fluad 65 yrs and older) 2019    Pneumococcal Conjugate 13-valent (Ojsyars89) 2017    Pneumococcal Polysaccharide ([id number]) 2018    Tdap (Boostrix, Adacel) 09/14/2012       Active Problems:  Patient Active Problem List   Diagnosis Code    Mixed hyperlipidemia E78.2    Anxiety F41.9    Primary osteoarthritis involving multiple joints M89.49    Essential hypertension I10    Restless legs syndrome (RLS) G25.81    Cardiomyopathy ([facility] I42.9    Chronic pain syndrome G89.4    Coronary artery disease involving native coronary artery of native heart without angina pectoris I25.10    Mixed incontinence N39.46    Paroxysmal atrial fibrillation (HCC) I48.0    Chronic low back pain without sciatica M54.50, G89.29    Status post-operative repair of closed fracture of right hip Z98.890, Z87.81    Fracture of femur, intertrochanteric, closed, right, initial encounter ([facility] S72.141A       Isolation/Infection:   Isolation            No Isolation          Patient Infection Status       None to display            Nurse Assessment:  Last Vital Signs: BP (!) 104/52   Pulse 89   Temp 98.6 °F (37 °C) (Oral)   Resp 15   Ht 5' 1\" (1.549 m)   Wt 150 lb 9.2 oz (68.3 kg)   SpO2 90%   BMI 28.45 kg/m²     Last documented pain score (0-10 scale): Pain Level: 7  Last Weight:   Wt Readings from Last 1 Encounters:   02/12/22 150 lb 9.2 oz (68.3 kg)     Mental Status:  oriented and alert    IV Access:  - None    Nursing Mobility/ADLs:  Walking   Assisted  Transfer  Assisted  Bathing  Assisted  Dressing  Assisted  Toileting  Assisted  Feeding  Assisted  Med Admin  Assisted  Med Delivery   whole    Wound Care Documentation and Therapy: right hip silver mepilex , remove when 7 days postop        Elimination:  Continence: Bowel: Yes  Bladder: Yes  Urinary Catheter: None   Colostomy/Ileostomy/Ileal Conduit: No       Date of Last BM= 2/7/2022    Intake/Output Summary (Last 24 hours) at 2/12/2022 1550  Last data filed at 2/12/2022 1054  Gross per 24 hour   Intake 660 ml   Output 650 ml   Net 10 ml     I/O last 3 completed shifts:   In: 1200 [I.V.:1200]  Out: 750 [Urine:650; Blood:100]    Safety Concerns: At Risk for Falls    Impairments/Disabilities:      Vision, Hearing, and NWB RUE    Nutrition Therapy:  Current Nutrition Therapy:   - Oral Diet:  Low Sodium (2gm)    Routes of Feeding: Oral  Liquids: Thin Liquids  Daily Fluid Restriction: no  Last Modified Barium Swallow with Video (Video Swallowing Test): not done    Treatments at the Time of Hospital Discharge:   Respiratory Treatments: none   Oxygen Therapy:  is not on home oxygen therapy. Ventilator:    - No ventilator support    Rehab Therapies: Physical Therapy, Occupational Therapy, and Visiting nurse   Weight Bearing Status/Restrictions: NON weight bearing ROB , WBAT RLE   Other Medical Equipment (for information only, NOT a DME order):  wheelchair and hospital bed  Other Treatments: Follow-up CHF     Patient's personal belongings (please select all that are sent with patient):  Glasses upper denture     RN SIGNATURE:  Electronically signed by Ramila Medley RN on 2/13/22 at 10:39 AM EST    CASE MANAGEMENT/SOCIAL WORK SECTION    Inpatient Status Date: 2/11/22    Readmission Risk Assessment Score:  Readmission Risk              Risk of Unplanned Readmission:  6           Discharging to Facility/ Agency   Name:  [facility] care    Address: [address] [place] [address], [address] [place] [address]  Phone: [phone]  Fax: [phone]              / signature: Electronically signed by DANA Urbano on 2/12/22 at 6:04 PM EST    PHYSICIAN SECTION    Prognosis: Good    Condition at Discharge: Stable    Rehab Potential (if transferring to Rehab): Good    Recommended Labs or Other Treatments After Discharge: none    Physician Certification: I certify the above information and transfer of Fransisco Ashraf  is necessary for the continuing treatment of the diagnosis listed and that she requires Home Care for greater 30 days.      Update Admission H&P: No change in H&P    PHYSICIAN SIGNATURE:  Electronically signed by Hugo Narayan MD on 2/12/22 at 3:50 PM EST    Followup in office with Dr Jean Talbert 2 weeks postop   [facility], [address] [place] [address], [address] [place] [address]. [address], [phone]

## 2022-02-12 NOTE — PROGRESS NOTES
D: pt is not feeling well, pt nauseated at this time, this RN discussed plan of care with pt and pt has been eating a regular diet here instead of watching her sodium and now feels bloated A: this RN called Dr. Claribel Nguyen and reported assessment findings R: Dr. Claribel Nguyen ordered to change diet to low sodium and cancel discharge order

## 2022-02-12 NOTE — PROGRESS NOTES
Physician Progress Note      Cinthia Bagley  CSN #:                  [id number]  :                       1950  ADMIT DATE:       2022 11:20 AM  DISCH DATE:  RESPONDING  PROVIDER #:        Sveta Cherry          QUERY TEXT:    Pt admitted with Right intertrochanteric femur fracture, non-displaced and has   CHF documented. If possible, please document in progress notes and discharge   summary further specificity regarding the type and acuity of CHF:    The medical record reflects the following:  Risk Factors: CHF, HTN  Clinical Indicators: Per Anesthesia note 22 \"CHF:----ROS comment: ECHO   : EF 55%\"  ECHO 22 \" EF 58%   Diastolic filling parameters suggest   grade I diastolic dysfunction\"  MAKAYLA diet  Treatment:  Lasix 40mg oral daily, Coreg BID  Options provided:  -- Chronic Diastolic CHF/HFpEF  -- Other - I will add my own diagnosis  -- Disagree - Not applicable / Not valid  -- Disagree - Clinically unable to determine / Unknown  -- Refer to Clinical Documentation Reviewer    PROVIDER RESPONSE TEXT:    Provider is clinically unable to determine a response to this query.     Query created by: Kathy Owens on 2022 7:08 AM      Electronically signed by:  Sveta Cherry 2022 7:40 AM

## 2022-02-12 NOTE — PROGRESS NOTES
Physical Therapy    Facility/Department: [address] ORTHOPEDICS  Initial Assessment   Co Treat with OT  This note serves as patient discharge summary if pt discharges prior to next PT visit      NAME: Arjun Osborn  : 1950  MRN: [id number]    Date of Service: 2022    Discharge Recommendations:  S Level 1,24 hour supervision or assist   Arjun Osborn scored a 16/24 on the AM-PAC short mobility form. Current research shows that an AM-PAC score of 18 or greater is typically associated with a discharge to the patient's home setting. Based on the patient's AM-PAC score and their current functional mobility deficits, it is recommended that the patient have 2-3 sessions per week of Physical Therapy at d/c to increase the patient's independence. At this time, this patient demonstrates the endurance and safety to discharge home with home therapy services and a follow up treatment frequency of 2-3x/wk. Please see assessment section for further patient specific details. Assessment   Body structures, Functions, Activity limitations: Decreased functional mobility ; Decreased ROM; Decreased balance; Increased pain;Decreased ADL status  Assessment: Per Leo Buchanan MD's note:  Arjun Osborn is a 73 y.o. who is s/p cephalomedullary nail of a nondisplaced right intertrochanteric femur fracture 2022 per Dr. Peter Ardnt. She also has a right proximal humerus fracture that is being treated nonoperatively. Both injuries occurred 2022. Patient had been at home managing with her . This was planned as an outpatient surgery, however she stayed the night. Pt WBAT right lower extremity, NWB RUE in sling. PMHx includes: CHF, DJD, HTN, OA, HLD, RLS, R wrist fx sx    Prior status: while awaiting surgery, patient had been managing at home with her , having obtained a bedside commode and transferring to it using child's crutch as a cane, and sleeping in recliner chair.    Status 2022: Bed mobiilty extra time and SBA, Transfers extra time and CGA, and Gait 5' x 2 with both pyramid cane R UE, then 5' x 2 with child's crutch used as a cane. Patient performed simulated stairs as at home entry with Min A and cues. Patient maintains R UE NWBng throughout session, and mobilizes safely. Therapist did recommend pyramid cane for ambulation, and [employer] trialed, but she reports feeling more confident with the child's crutch at cane height, as she had been provided this by another facility after her fall on 1-28-22. Patient adamant about DC to home, and does demonstrate ability to mobilize enough that she can return to home. Recommend patient obtain transport chair to decrease gait distances needed both in and out of the home, and continue using BSC at home. Also recommend patient consider pyramid cane as gait progresses. She verb understanding. Recommend 24 hour supervision/assist, and home PT level 1. Treatment Diagnosis: Impaired functional mobility  Prognosis: Good  Decision Making: Medium Complexity  History: as noted  Clinical Presentation: Evolving  PT Education: Goals;PT Role;Plan of Care;Transfer Training;Gait Training;Equipment;Weight-bearing Education;Precautions  Patient Education: multiple methods of stair negotiation demonstrated to pt. Car transfer technique demonstrated to patient. REQUIRES PT FOLLOW UP: Yes (unless DC'd home)  Activity Tolerance  Activity Tolerance: Patient limited by pain  Activity Tolerance: Reported dizziness with initial out of bed. This clears during session. Patient Diagnosis(es): There were no encounter diagnoses. has a past medical history of Anxiety, Arthritis, Broken nose, CHF (congestive heart failure) ([facility] [address] DJD (degenerative joint disease), Essential hypertension, Hyperlipidemia, Hypertension, Mixed hyperlipidemia, Primary osteoarthritis involving multiple joints, and Restless legs syndrome (RLS). has a past surgical history that includes Breast enhancement surgery;  Wrist fracture surgery (4/10/12); Cataract removal with implant (Bilateral, 2015); and Hip fracture surgery (Right, 2/11/2022). Restrictions  Restrictions/Precautions  Restrictions/Precautions: Fall Risk,Weight Bearing  Lower Extremity Weight Bearing Restrictions  Right Lower Extremity Weight Bearing: Weight Bearing As Tolerated  Upper Extremity Weight Bearing Restrictions  Right Upper Extremity Weight Bearing: Non Weight Bearing  Other: NWB R UE in sling  Position Activity Restriction  Other position/activity restrictions: WBAT R LE, NWB R UE in sling     Vision/Hearing  Vision: Within Functional Limits  Vision Exceptions: Wears glasses for reading (occasionally wears glasses for reading)  Hearing: Within functional limits       Subjective  General  Chart Reviewed: Yes  Additional Pertinent Hx: Per Ashtyn Olivarez MD's note:  Yoli Shabazz is a 73 y.o. who is s/p cephalomedullary nail of a nondisplaced right intertrochanteric femur fracture 2/11/2022 per Dr. Charlie Hopkins. She also has a right proximal humerus fracture that is being treated nonoperatively. Both injuries after falling on occurred 1-. Patient had been at home managing with her . This was planned as an outpatient surgery, however she stayed the night. Pt WBAT right lower extremity, NWB RUE in sling. PMHx includes: CHF, DJD, HTN, OA, HLD, RLS, R wrist fx sx,  Response To Previous Treatment: Not applicable  Family / Caregiver Present: No  Referring Practitioner: Ashtyn Olivarez MD  Referral Date : 02/12/22  Subjective  Subjective: Patient in bed. Agreeable to therapy. Reports R posterior hip and lateral thigh, that seems moderate with mobility.   Pain Screening  Patient Currently in Pain: No    Orientation  Orientation  Overall Orientation Status: Within Normal Limits     Social/Functional History  Social/Functional History  Lives With: Spouse  Type of Home: House  Home Layout: Multi-level (4-story home (3 levels with basement), bedroom/bathroom on 3rd floor, half bath on first floor, pt stays on main level (no bathroom), litter boxes in basement, laundry on lower level/first floor)  Home Access: Stairs to enter without rails  Entrance Stairs - Number of Steps: 2 CHETAN  Bathroom Shower/Tub: Tub/Shower unit  Bathroom Toilet: Bedside commode (has been using BSC for the past 2 weeks)  Bathroom Equipment: Grab bars in shower,Toilet raiser,3-in-1 commode (RTS arms, BSC)  Bathroom Accessibility: Walker accessible  Home Equipment: Crutches  ADL Assistance: Independent (independent prior to 2 weeks ago, since fall  has been assisting p with bathing, dressing, and toileting with BSC (pt can still complete pericare))  Homemaking Assistance: Independent  Ambulation Assistance: Independent (no AD prior to 2 weeks, has been using single short crutch since fx)  Transfer Assistance: Independent  Active : Yes  Occupation: Retired  Leisure & Hobbies: 10 rescue cats  IADL Comments: pt has been sleeping on reclining couch, using BSC. Additional Comments: Pt reports 1 additional recent fall. Cognition   Cognition  Overall Cognitive Status: Exceptions  Arousal/Alertness: Appropriate responses to stimuli  Following Commands: Follows one step commands consistently  Attention Span: Attends with cues to redirect  Memory: Appears intact  Problem Solving: Assistance required to generate solutions  Insights: Decreased awareness of deficits  Initiation: Does not require cues  Sequencing: Does not require cues  Cognition Comment: anxious. Also has other ideas about how to best protect herself, i.e. using man's belt around her torso and R mid-shaft humerus to hold R arm to trunk, vs using R UE sling, which \"Chokes \" her. Objective  AROM RLE (degrees)  RLE AROM: WNL  RLE General AROM: some guarding.  Slow mobility  AROM LLE (degrees)  LLE AROM : WNL  AROM RUE (degrees)  RUE General AROM: R UE in sling/immobiliazed to chest  Strength RLE  Strength RLE: [facility]  Strength LLE  Strength LLE: WNL  Sensation  Overall Sensation Status: WFL  Bed mobility  Supine to Sit: Stand by assistance (HOB elevated, use of rail, increased time)  Sit to Supine: Unable to assess (pt seated in recliner at end of session)  Scooting: Stand by assistance (to scoot to EOB)  Transfers  Sit to Stand: Contact guard assistance (From EOB, and reclner x 2. Slow and guarded, but steady)  Stand to sit: Contact guard assistance (BS chair x 3)  Ambulation  Ambulation?: Yes  More Ambulation?: Yes  Ambulation 1  Surface: level tile  Device: Hemiwalker (L hand.)  Assistance: Contact guard assistance  Quality of Gait: slow, and guarded, with antalgic stepping, and patient reporting feeling more comfortable using a child's crutch as a cane, as she used this after fractures at home while awaiting sx. Therapist instructs in fact that pyramid cane is more supportive and stable, and pt verb understanding, but states preference for her child crutch/cane. Gait Deviations: Slow Marisabel;Decreased step height;Decreased step length  Distance: antalgic. Comments: 5', then requests crutch / cane  Ambulation 2  Surface - 2: level tile  Device 2:  (Child's crutch, lowered to cane height)  Assistance 2: Contact guard assistance  Gait Deviations: Decreased step height;Decreased step length; Slow Marisabel  Distance: 5' x 2  Stairs/Curb  Stairs?: Yes  Stairs  Rails:  (L doorframe, as at home)  Curbs: 8\" (simulating largest step into home.)  Assistance: Minimal assistance  Comment: cues for sequencing.   Balance  Posture: Good  Sitting - Static: Good  Sitting - Dynamic: Good  Standing - Static: Good;-  Standing - Dynamic: Fair;+ (with L UE using device support)    Plan   Plan  Times per week: 1-3 sessions as needed  Current Treatment Recommendations: Functional Mobility Training,Transfer Training,Gait Training,Stair training  Safety Devices  Type of devices: Call light within reach,Gait belt,Patient at risk for falls,Left in chair,Nurse notified (ice packs filled and placed to [address] informed.)    AM-PAC Score  AM-PAC Inpatient Mobility Raw Score : 16 (02/12/22 1435)  AM-PAC Inpatient T-Scale Score : 40.78 (02/12/22 1435)  Mobility Inpatient CMS 0-100% Score: 54.16 (02/12/22 1435)  Mobility Inpatient CMS G-Code Modifier : CK (02/12/22 1435)     Goals  Short term goals  Time Frame for Short term goals: By acute DC  Short term goal 1: Bed mobility Mod indep  Short term goal 2: Transfers CGA-SBA  Short term goal 3: Gait with most appropriate AD 15', SBA  Patient Goals   Patient goals : \"I am not going to a nursing home. \"    Therapy Time   Individual Concurrent Group Co-treatment   Time In 5836         Time Out 1355         Minutes 90            Ev 15; Gt 30; TA 39    Jonathan Granger PT  Electronically signed by Brent Alcocer, [address] [place] [address] Drive [phone])  on 2/12/2022 at 2:41 PM

## 2022-02-12 NOTE — CARE COORDINATION
CASE MANAGEMENT DISCHARGE SUMMARY:    DISCHARGE DATE: 2/12/22    DISCHARGED TO: Home    Discharging to Facility/ Agency   · Name:  [facility]    · Address: [address] [place] [address]., [address] [place]  · Phone: [phone]  · Fax: [phone]       Provided 14 inch wheelchair, cushion and foot rest from [address] [place] [address] supply closet. Patient is also requesting a [address] advised it would not be able to be delivered until Wednesday when a  is in their area.     ( elise needs documentation that patient has no Bedroom on the 1st floor of her home, needs head elevated for CHF and MD order for bed)             TRANSPORTATION:                Electronically signed by DANA Truong on 2/12/2022 at 6:22 PM

## 2022-02-12 NOTE — PROGRESS NOTES
Occupational Therapy   Occupational Therapy Initial Assessment  Date: 2022   Patient Name: Indu Tariq  MRN: [id number]     : 1950    Date of Service: 2022    Discharge Recommendations:  Home with Home health OT,24 hour supervision or assist,Patient would benefit from continued therapy after discharge       Assessment   Performance deficits / Impairments: Decreased functional mobility ; Decreased ADL status; Decreased ROM; Decreased strength;Decreased endurance;Decreased balance;Decreased high-level IADLs  Assessment: Pt funtioning below baseline, limited in self-care by pain/ROM limitations R LE, and pain/NWB status/restrictions R UE in sling affecting pt's balance, fxl mobility, fxl activity tolerance, and ADL status s/p Right intertrochanteric femur fracture s/p CMN 22 per Dr. Kaushik Pena, non-operative treatment Right proximal humerus fracture in sling. This date, pt required CGA fxl transfers/mobility with short crutch, total A toileting and LB dressing, and anticipate pt would require overall max A for ADLs based on the above performance deficits. Will cont to see on acute to address the above limitations and maximize pt's safety and independence. Discussed d/c plans who is adamant she will be returning home despite low AM-PAC score. Recommend 24 hour assist and home OT at d/c. Prognosis: Fair;Good  Decision Making: Medium Complexity  OT Education: OT Role;Plan of Care;ADL Adaptive Strategies;Transfer Training;Precautions; Equipment  Barriers to Learning: anxiety, decreased insight  REQUIRES OT FOLLOW UP: Yes  Activity Tolerance  Activity Tolerance: Patient limited by pain; Patient limited by fatigue  Activity Tolerance: dizziness  Safety Devices  Safety Devices in place: Yes  Type of devices: Call light within reach; Chair alarm in place; Left in chair;Gait belt;Nurse notified           Patient Diagnosis(es): There were no encounter diagnoses.      has a past medical history of Anxiety, floor, half bath on first floor, pt stays on main level (no bathroom), litter boxes in basement, laundry on lower level/first floor)  Home Access: Stairs to enter without rails  Entrance Stairs - Number of Steps: 2 CHETAN  Bathroom Shower/Tub: Tub/Shower unit  Bathroom Toilet: Bedside commode (has been using BSC for the past 2 weeks)  Bathroom Equipment: Grab bars in shower,Toilet raiser,3-in-1 commode (RTS arms, BSC)  Bathroom Accessibility: Walker accessible  Home Equipment: Crutches  ADL Assistance: Independent (independent prior to 2 weeks ago, since fall  has been assisting p with bathing, dressing, and toileting with BSC (pt can still complete pericare))  Homemaking Assistance: Independent  Ambulation Assistance: Independent (no AD prior to 2 weeks, has been using single short crutch since fx)  Transfer Assistance: Independent  Active : Yes  Occupation: Retired  Leisure & Hobbies: 10 rescue cats  IADL Comments: pt has been sleeping on reclining couch  Additional Comments: Pt reports 1 additional recent fall. Objective   Vision: Within Functional Limits  Vision Exceptions: Wears glasses for reading (occasionally wears glasses for reading)  Hearing: Within functional limits      Orientation  Overall Orientation Status: Within Functional Limits     Balance  Sitting Balance: Stand by assistance  Standing Balance: Contact guard assistance  Functional Mobility  Functional - Mobility Device: Crutches  Assist Level: Contact guard assistance  Functional Mobility Comments: Pt completed fxl mobility ~5 ft x3 using short crutch like cane. Pt attempted ~5 ft with hemiwalker, but prefers crutch. Pt slow, easily fatigues with c/o pain and dizziness. Pt negotiated 1 high step with PT, OT present for safety. See PT note for details and levels of assist.    ADL  UB dressing: Pt educated on proper donning of sling and on UB dressing techniques with R UE restrictions, pt verbalized understanding.    LE Dressing: Dependent/Total (to doff depends and don pull up briefs, socks/shoes. Pt educated on LB dressing techniques s/p surgery, pt verbalized understanding.)  Toileting: Dependent/Total (external purewick catheter removed for OOB. Assist for toileting hygiene in stance)  Additional Comments: Anticipate pt is set-up feeding, max A bathing and dressing based on ROM/strength, balance, endurance. Coordination  Movements Are Fluid And Coordinated: No  Quality of Movement Other  Comment: Pt is R-handed. R UE FM/GM limited by pain/restrictions in sling. Bed mobility  Supine to Sit: Stand by assistance (HOB elevated, use of rail, increased time)  Sit to Supine: Unable to assess (pt seated in recliner at end of session)  Scooting: Stand by assistance (to scoot to EOB)     Transfers  Sit to stand: Contact guard assistance (from EOB and recliner)  Stand to sit: Contact guard assistance (to EOB and recliner)     Cognition  Overall Cognitive Status: Exceptions  Arousal/Alertness: Appropriate responses to stimuli  Following Commands:  Follows one step commands consistently  Attention Span: Attends with cues to redirect  Memory: Appears intact  Safety Judgement: Decreased awareness of need for safety;Decreased awareness of need for assistance  Problem Solving: Decreased awareness of errors  Insights: Decreased awareness of deficits  Initiation: Does not require cues  Sequencing: Does not require cues  Cognition Comment: anxious     LUE AROM (degrees)  LUE AROM : WFL  RUE AROM (degrees)  RUE AROM : Exceptions  RUE General AROM: Right proximal humerus fracture in sling, WFL digits  LUE Strength  Gross LUE Strength: WFL  RUE Strength  Gross RUE Strength: Exceptions to [facility]  R Hand General: 4/5  RUE Strength Comment: Right proximal humerus fracture, NWB R UE in sling                   Plan   Plan  Times per week: 3-5  Current Treatment Recommendations: Strengthening,ROM,Balance Training,Functional Mobility Training,Safety Education & Training,Endurance Training,Self-Care / ADL,Equipment Evaluation, Education, & procurement      AM-PAC Score        AM-PAC Inpatient Daily Activity Raw Score: 12 (02/12/22 1347)  AM-PAC Inpatient ADL T-Scale Score : 30.6 (02/12/22 1347)  ADL Inpatient CMS 0-100% Score: 66.57 (02/12/22 1347)  ADL Inpatient CMS G-Code Modifier : CL (02/12/22 1347)    Goals  Short term goals  Time Frame for Short term goals: Prior to d/c:  Short term goal 1: Pt will complete UB bathing/dressing with min A. Short term goal 2: Pt will complete LB bathing/dressing with mod A. Short term goal 3: Pt will complete toileting with min A. Short term goal 4: Pt will complete fxl mobility and fxl transfers to/from ADL surfaces with SBA using LRAD. Short term goal 5: Pt will tolerate standing >5 minutes for functional task with SBA to improve standing tolerance for ADL routine. Long term goals  Time Frame for Long term goals : STGs=LTGs  Patient Goals   Patient goals : to return home. Therapy Time   Individual Concurrent Group Co-treatment   Time In 1150         Time Out 1330         Minutes 100         Timed Code Treatment Minutes: 90 Minutes     This note to serve as OT d/c summary if pt is d/c-ed prior to next therapy session.     Drew King, OTR/L [id number]

## 2022-02-13 VITALS
BODY MASS INDEX: 28.8 KG/M2 | OXYGEN SATURATION: 93 % | DIASTOLIC BLOOD PRESSURE: 69 MMHG | WEIGHT: 152.56 LBS | SYSTOLIC BLOOD PRESSURE: 123 MMHG | HEIGHT: 61 IN | RESPIRATION RATE: 16 BRPM | TEMPERATURE: 98.3 F | HEART RATE: 77 BPM

## 2022-02-13 PROCEDURE — 6370000000 HC RX 637 (ALT 250 FOR IP): Performed by: ORTHOPAEDIC SURGERY

## 2022-02-13 PROCEDURE — 94761 N-INVAS EAR/PLS OXIMETRY MLT: CPT

## 2022-02-13 RX ADMIN — OXYCODONE 5 MG: 5 TABLET ORAL at 02:18

## 2022-02-13 RX ADMIN — OXYCODONE 5 MG: 5 TABLET ORAL at 10:23

## 2022-02-13 RX ADMIN — DIPHENHYDRAMINE HCL 25 MG: 25 TABLET ORAL at 11:06

## 2022-02-13 RX ADMIN — DIPHENHYDRAMINE HCL 25 MG: 25 TABLET ORAL at 04:37

## 2022-02-13 RX ADMIN — ASPIRIN 81 MG: 81 TABLET, COATED ORAL at 10:22

## 2022-02-13 ASSESSMENT — PAIN SCALES - GENERAL
PAINLEVEL_OUTOF10: 4
PAINLEVEL_OUTOF10: 8
PAINLEVEL_OUTOF10: 9

## 2022-02-13 ASSESSMENT — PAIN - FUNCTIONAL ASSESSMENT: PAIN_FUNCTIONAL_ASSESSMENT: PREVENTS OR INTERFERES SOME ACTIVE ACTIVITIES AND ADLS

## 2022-02-13 ASSESSMENT — PAIN DESCRIPTION - LOCATION: LOCATION: HIP

## 2022-02-13 ASSESSMENT — PAIN DESCRIPTION - PAIN TYPE: TYPE: SURGICAL PAIN

## 2022-02-13 ASSESSMENT — PAIN DESCRIPTION - FREQUENCY: FREQUENCY: CONTINUOUS

## 2022-02-13 ASSESSMENT — PAIN DESCRIPTION - PROGRESSION: CLINICAL_PROGRESSION: NOT CHANGED

## 2022-02-13 ASSESSMENT — PAIN DESCRIPTION - ORIENTATION: ORIENTATION: RIGHT

## 2022-02-13 ASSESSMENT — PAIN DESCRIPTION - ONSET: ONSET: ON-GOING

## 2022-02-13 ASSESSMENT — PAIN DESCRIPTION - DESCRIPTORS: DESCRIPTORS: CONSTANT

## 2022-02-13 NOTE — CARE COORDINATION
14 In wheelchair was too small for patient,      Provided 20 inch wheelchair, cushion and foot rest from [employer] supply closet. ( only wheelchair available in closet was 20 & 24 inch)  # on wheelchair [phone]

## 2022-02-13 NOTE — PROGRESS NOTES
Patient resting and sleeping at intervals. Call light in reach. Side rails up x 3. Fall precautions in place .  Electronically signed by Kaushal Peralta RN on 2/13/2022 at 2:03 AM

## 2022-02-13 NOTE — PLAN OF CARE
Problem: Falls - Risk of:  Goal: Will remain free from falls  2/13/2022 0148 by Janice Hill RN  Outcome: Ongoing  2/12/2022 2046 by Debroah Merlin, RN  Outcome: Ongoing  Goal: Absence of physical injury  2/13/2022 0148 by Janice Hill RN  Outcome: Ongoing  2/12/2022 2046 by Debroah Merlin, RN  Outcome: Ongoing     Problem: Skin Integrity:  Goal: Will show no infection signs and symptoms  2/13/2022 0148 by Janice Hill RN  Outcome: Ongoing  2/12/2022 2046 by Debroah Merlin, RN  Outcome: Ongoing  Goal: Absence of new skin breakdown  2/13/2022 0148 by Janice Hill RN  Outcome: Ongoing  2/12/2022 2046 by Debroah Merlin, RN  Outcome: Ongoing     Problem: Pain:  Goal: Pain level will decrease  2/13/2022 0148 by Janice Hill RN  Outcome: Ongoing  Patients Pain will be controlled with pharmacologic and non-pharmacologic interventions . Nursing will continue to monitor.    2/12/2022 2046 by Debroah Merlin, RN  Outcome: Ongoing  Goal: Control of acute pain  2/13/2022 0148 by Janice Hill RN  Outcome: Ongoing  2/12/2022 2046 by Debroah Merlin, RN  Outcome: Ongoing  Goal: Control of chronic pain  2/13/2022 0148 by Janice Hill RN  Outcome: Ongoing  2/12/2022 2046 by Debroah Merlin, RN  Outcome: Ongoing

## 2022-02-13 NOTE — PROGRESS NOTES
Data- discharge order received, pt verbalized agreement to discharge, needs for [facility] with [facility]  for OT/PT/VN  and/or new Durable Medical Equipment through [address] [place] [address]  for W/C , crutch and hospital bed , ALEX reviewed and signed by MD, to be completed by RN. Action- AVS prepared, discharge instructions prepared and given to pt. , medication information packet given r/t NEW or CHANGED prescriptions, pt verbalized understanding further self-review. D/C instruction summary: Diet- reg , Activity- up with assistance , follow up with Primary Care Physician Theo Chapa [phone] appointment on February 28, 2022 and advised to follow-up with Dr. Pavel Perdomo in 2 weeks , immunizations reviewed and up to date , medications prescriptions to be filled none , just take baby aspirin BID for 2 weeks . Inpatient surgical procedural reviewed: right hip pinning and NWB on RUE . Contact information provided to above agencies used. Response- Case Management/ reported faxing completed ALEX and AVS to needed HHC/DME services stated above. Pt belongings gathered, IV removed, pt dressed with assistance . Disposition is home with HHC/DME as stated above, transported with  , taken to lobby via w/c with  , no complications.

## 2022-02-13 NOTE — PROGRESS NOTES
[facility]) Orthopaedic Surgery  Progress Note    Cheyenne Sands is a 73 y.o. who is POD#2 s/p cephalomedullary nail of a nondisplaced right intertrochanteric femur fracture. She also has a right proximal humerus fracture that is being treated nonoperatively. She worked with therapy yesterday. She was set to go home yesterday but had nausea. Nausea is improved this morning and now she is ready to go home. Patient Active Problem List   Diagnosis    Mixed hyperlipidemia    Anxiety    Primary osteoarthritis involving multiple joints    Essential hypertension    Restless legs syndrome (RLS)    [facility]    Chronic pain syndrome    Coronary artery disease involving native coronary artery of native heart without angina pectoris    Mixed incontinence    Paroxysmal atrial fibrillation (HCC)    Chronic low back pain without sciatica    Status post-operative repair of closed fracture of right hip    Fracture of femur, intertrochanteric, closed, right, initial encounter ([facility]       Exam:  /69   Pulse 77   Temp 98.3 °F (36.8 °C) (Oral)   Resp 16   Ht 5' 1\" (1.549 m)   Wt 152 lb 8.9 oz (69.2 kg)   SpO2 92%   BMI 28.83 kg/m²      Appearance: Sitting up in hospital bed, appears to be in no acute distress, awake and alert  Resp: unlabored breathing on room air  RLE: Dressings are in place and are clean and dry. Thigh soft compressible. She demonstrates active ankle plantarflexion and dorsiflexion. Sensation intact light touch throughout the lower extremity. Assessment:  Right proximal humerus fracture, nonoperative treatment  Right intertrochanteric femur fracture s/p CMN    Plan:   Activity as tolerated  WBAT right lower extremity, NWB RUE in sling  Pain control  PT/OT  DVT prophylaxis: Recommend ASA 81 mg twice daily for 4 weeks postoperatively  Disposition: Likely home today with home health    Vernon Russell MD  2/13/2022

## 2022-02-13 NOTE — CARE COORDINATION
CASE MANAGEMENT DISCHARGE SUMMARY:     DISCHARGE DATE: 2/13/22     DISCHARGED TO: Home     Discharging to Facility/ Agency   · Name:  [facility] care                · Address: [address] [place] [address]., [address] [place] [address]., [address]  · Phone: [phone]  · Fax: [phone]     Confirmed with [facility] care orders received.       Provided 14 inch wheelchair, cushion and foot rest from yet[employer] supply closet.     Patient is also requesting a [address] advised it would not be able to be delivered until Wednesday when a  is in their area.     ( [employer] needs documentation that patient has no Bedroom on the 1st floor of her home, needs head elevated for CHF and MD order for bed)              TRANSPORTATION:

## 2022-02-13 NOTE — PROGRESS NOTES
[facility] Orthopaedic Surgery Note    The use of a hospital bed is medically necessary for this patient due to ipsilateral right proximal humerus and right hip fractures. She has no bedroom on first floor of her home. She needs a elevated head of the bed due to congestive heart failure.      Sharlene Wilson MD

## 2022-02-14 ENCOUNTER — CARE COORDINATION (OUTPATIENT)
Dept: CASE MANAGEMENT | Age: 72
End: 2022-02-14

## 2022-02-14 DIAGNOSIS — S72.141A: Primary | ICD-10-CM

## 2022-02-14 PROCEDURE — 1111F DSCHRG MED/CURRENT MED MERGE: CPT | Performed by: FAMILY MEDICINE

## 2022-02-14 NOTE — CARE COORDINATION
[facility] Transitions Initial Follow Up Call    Call within 2 business days of discharge: Yes    Patient: Ame Greene Patient : 1950   MRN: [id number]  Reason for Admission: Right femur cephalomedullary nail       Discharge Date: 22 RARS: Readmission Risk Score: 9.4 ( )      Last Discharge [facility]       Complaint Diagnosis Description Type Department Provider    22   Admission (Discharged) Julius Del Valle MD           Spoke with: Milena HERNÁNDEZ  Non-face-to-face services provided:  Obtained and reviewed discharge summary and/or continuity of care documents  Communication with home health agencies or other community services the patient is currently using-[facility]  Transitions of Care Initial Call    Was this an external facility discharge? No Discharge Facility:     Challenges to be reviewed by the provider   Additional needs identified to be addressed with provider: Yes  home health [address] for PT and OT             Method of communication with provider : phone      Advance Care Planning:   Does patient have an Advance Directive: Not on file  Was this a readmission? No  Patient stated reason for admission: Right femur cephalomedullary nail     Patients top risk factors for readmission: ineffective coping    Care Transition Nurse (CTN) contacted the patient by telephone to perform post hospital discharge assessment. Verified name and  with patient as identifiers. Provided introduction to self, and explanation of the CTN role. CTN reviewed discharge instructions, medical action plan and red flags with patient who verbalized understanding. Patient given an opportunity to ask questions and does not have any further questions or concerns at this time. Were discharge instructions available to patient? Yes.  Reviewed appropriate site of care based on symptoms and resources available to patient including: PCP, Specialist, Urgent care clinics, Provider [place] visits, When to call 911 and [address]. The patient agrees to contact the PCP office for questions related to their healthcare. Medication reconciliation was performed with patient, who verbalizes understanding of administration of home medications. Advised obtaining a 90-day supply of all daily and as-needed medications. Covid Risk Education     Educated patient about risk for severe COVID-19 due to risk factors according to CDC guidelines. LPN CC reviewed discharge instructions, medical action plan and red flag symptoms with the patient who verbalized understanding. Discussed COVID vaccination status: Yes. Education provided on COVID-19 vaccination as appropriate. Discussed exposure protocols and quarantine with CDC Guidelines. Patient was given an opportunity to verbalize any questions and concerns and agrees to contact LPN CC or health care provider for questions related to their healthcare. Reviewed and educated patient on any new and changed medications related to discharge diagnosis. Was patient discharged with a pulse oximeter? No Discussed and confirmed pulse oximeter discharge instructions and when to notify provider or seek emergency care. LPN CC provided contact information. Plan for follow-up call in 5-7 days based on severity of symptoms and risk factors. Plan for next call: symptom management-Pain management     This [facility] spoke with pt and pt stated that she is doing well. Patient denied any worsening symptoms. Denied fever, chills, N/V and any difficulty breathing at this time. Denied difficulty with urination, BMs or appetite. Denied chest pain and SOB. Incision is CDI. Pt does have some pain and is managing with her pain meds. Pt had visit with SN and PT today. Denied any needs and concerns at this time. Medication review performed and patient verbalized understanding and is taking all medications as prescribed. F/u with 02/28/22 with PCP.  Advised pt to immediately report any worsening symptoms to the PCP. Patient verbalized understanding and agreed.   Trung Sosa LPN, [facility]  PH: [phone]        Care Transitions 24 Hour Call    Schedule Follow Up Appointment with PCP: Completed  Do you have any ongoing symptoms?: Yes  Patient-reported symptoms: Pain  Interventions for patient-reported symptoms: Other  Do you have a copy of your discharge instructions?: Yes  Do you have all of your prescriptions and are they filled?: Yes  Have you been contacted by a [facility] Pharmacist?: No  Have you scheduled your follow up appointment?: Yes  How are you going to get to your appointment?: Car - family or friend to transport  Were you discharged with any Home Care or Post Acute Services: Yes  Post Acute Services: Home Health  Do you feel like you have everything you need to keep you well at home?: Yes  Care Transitions Interventions   Home Care Waiver: Completed Physical Therapy: Completed     Occupational Therapy: Completed            Follow Up  Future Appointments   Date Time Provider No Diane   2/28/2022  2:40 PM [facility]

## 2022-02-15 ENCOUNTER — TELEPHONE (OUTPATIENT)
Dept: ORTHOPEDIC SURGERY | Age: 72
End: 2022-02-15

## 2022-02-15 NOTE — TELEPHONE ENCOUNTER
Medical Facility Question     Facility Name: [address] [facility] Name: [email or web address]  Request or Information: The facility need Dr. Peter Arndt to sign some order for this patient. Please Advise.

## 2022-02-16 NOTE — DISCHARGE SUMMARY
Physician Discharge Summary     Patient ID:  Lisette Boo  [id number]  73 y.o.  1950    Admit date: 2/11/2022    Discharge date and time: 2/13/2022  1:35 PM     Admitting Physician: Kaity Ann MD     Discharge Physician: Nishant Foster    Admission Diagnoses: Status post-operative repair of closed fracture of right hip [Z98.890, Z87.81]  Fracture of femur, intertrochanteric, closed, right, initial encounter [facility]) Vero Brown    Discharge Diagnoses: right hip fracture    Admission Condition: good    Discharged Condition: good    Indication for Admission:She sustained right hip injury with the above noted fx and was scheduled for ORIF for stabilization of the fx. Surgical procedure: right hip IM nailing    Consults: PT OT SS    This patient had no postoperative complications. They has PT and OT for ADL's . IV and PO pain med for pain control and was eventually DC in stable condition    Treatments: analgesia,  therapies: PT OT,  and surgery      Disposition: home    Patient Instructions:   [unfilled]  Activity: activity as tolerated  Diet: regular diet  Wound Care: keep wound clean and dry    Follow-up with SAQIB Raphael in 2 weeks.     Signed:  KYLIE Pittman CNP  2/16/2022  2:50 PM

## 2022-02-21 ENCOUNTER — TELEPHONE (OUTPATIENT)
Dept: ORTHOPEDIC SURGERY | Age: 72
End: 2022-02-21

## 2022-02-21 ENCOUNTER — CARE COORDINATION (OUTPATIENT)
Dept: CASE MANAGEMENT | Age: 72
End: 2022-02-21

## 2022-02-21 NOTE — TELEPHONE ENCOUNTER
General Question     Subject: PATIENT WAS TOLD BY  THAT HE WANTS TO SEE HER ON Friday (2 WEEKS) PLEASE ADVISE WITH AVAILABILITY.    Patient: Huang Heard  Contact Number: [phone]

## 2022-02-21 NOTE — CARE COORDINATION
Care Transitions Follow Up Call    Needs to be reviewed by the provider   Additional needs identified to be addressed with provider: No               Method of communication with provider : none      Care Transition Nurse (CTN) contacted the patient by telephone to follow up after admission on 2022. Verified name and  with patient as identifiers. CTN provided contact information for future needs. Plan for next call: symptom management-routine post op care. Roma Palacios confirmed her follow up with Sammy Atwood for 2022. She states her  will provide her transportation. Roma Philip states she remains NWB with her right upper extremity - she states she is wearing a sling. She states [facility] is active. Roma Philip states she is WBAT on her right lower extremity. She states she has a hospital bed - walker - and cane at home. Roma Philip states she has not been able to weigh herself daily since her return home. She denies any SOB, chest pain, or edema. Roma Philip states she follows a low sodium diet and daily fluid restriction. She rates her pain at 5/10 at this time. She states she is taking pain medication as prescribed by the ortho team and feels it is effective when she takes it. Roma Philip states she has a dressing over her incision - she states the incision is intact - the dressing is coming up at the edges. She states her activity is minimal. Diannsinan Palacios states she is able to tolerate food & fluids - denies any N/V. She states her bowels are moving and she is urinating without difficulty. Roma Philip denies any fever. She denies any edema at her hip but does c/o bruising. She states she is using ice for pain management. Roma Palacios states she did receive both doses of the COVID vaccine & the additional booster. She states the OT is there now - just arrived. Diannsinan Palacios denies any needs at this time.     Deon Mcgowan RN BSN  Care Transition Nurse  [phone]

## 2022-02-24 ENCOUNTER — OFFICE VISIT (OUTPATIENT)
Dept: ORTHOPEDIC SURGERY | Age: 72
End: 2022-02-24

## 2022-02-24 VITALS — WEIGHT: 152 LBS | HEIGHT: 61 IN | BODY MASS INDEX: 28.7 KG/M2 | RESPIRATION RATE: 16 BRPM

## 2022-02-24 DIAGNOSIS — S72.144A CLOSED NONDISPLACED INTERTROCHANTERIC FRACTURE OF RIGHT FEMUR, INITIAL ENCOUNTER (HCC): ICD-10-CM

## 2022-02-24 DIAGNOSIS — S42.214A CLOSED NONDISPLACED FRACTURE OF SURGICAL NECK OF RIGHT HUMERUS, UNSPECIFIED FRACTURE MORPHOLOGY, INITIAL ENCOUNTER: Primary | ICD-10-CM

## 2022-02-24 PROCEDURE — 99024 POSTOP FOLLOW-UP VISIT: CPT | Performed by: ORTHOPAEDIC SURGERY

## 2022-02-24 NOTE — PROGRESS NOTES
[facility]  Office Visit    Chief Complaint: Right proximal humerus fracture and right hip fracture    HPI:  Ame Greene is a 73 y.o. who is here in follow-up of right shoulder and leg pain following a fall on January 28, 2022. She slipped on the snow and ice and landed on her right side. She is being treated nonoperatively for a right proximal humerus fracture. She was found to have a nondisplaced intertrochanteric femur fracture on MRI underwent cephalomedullary nail for 11/2022. She is here today for her first postoperative follow-up. She is walking with the use of a cane. She is here in a wheelchair today. She reports that both her right shoulder and hip feel better every day. She reports 5/10 pain in the hip and 3/10 pain in the shoulder. She has been working with home physical therapy on the hip but not been doing much with the shoulder. Patient Active Problem List   Diagnosis    Mixed hyperlipidemia    Anxiety    Primary osteoarthritis involving multiple joints    Essential hypertension    Restless legs syndrome (RLS)    Cardiomyopathy ([facility] [address]    Chronic pain syndrome    Coronary artery disease involving native coronary artery of native heart without angina pectoris    Mixed incontinence    Paroxysmal atrial fibrillation (HCC)    Chronic low back pain without sciatica    Status post-operative repair of closed fracture of right hip    Fracture of femur, intertrochanteric, closed, right, initial encounter ([facility] [address]       ROS:  Constitutional: denies fever, chills, weight loss  MSK: denies pain in other joints, muscle aches  Neurological: denies numbness, tingling, weakness    Exam:  Appearance: sitting in exam room chair, appears to be in no acute distress, awake and alert  Resp: unlabored breathing on room air  Skin: warm, dry and intact with out erythema or significant increased temperature  Neuro: grossly intact both lower extremities.  Intact sensation to light touch. Motor exam 4+ to 5/5 in all major motor groups. RLE: Examination demonstrates negative logroll and negative Stinchfield. Incisions are well-healed. There is brisk capillary refill. Strength is 5/5 in hamstrings, quads, hip flexors  RUE: Mild tenderness at the shoulder. Sensation intact light touch in axillary, radial, ulnar, median nerve distributions palpable radial pulse. She demonstrates active wrist flexion and extension as well as 20 degrees of shoulder forward flexion. Imaging:  3 views of the right shoulder were performed and interpreted today. There is a proximal humerus fracture that has acceptable alignment. There is minimal interval callus formation. 2 views of the right hip were performed and interpreted today. There is a cephalomedullary nail in place. No changes in alignment compared to intraoperative imaging. Assessment:  Right proximal humerus fracture  Right hip intertrochanteric femur fracture s/p CMN    Plan:  We discussed the diagnosis and treatment options. She will continue nonoperative management of the right proximal humerus fracture is has maintained acceptable alignment. Occupational therapy was ordered for home health to begin with gentle passive range of motion exercises. She may continue to be weightbearing as tolerated on the right lower extremity and continue physical therapy exercises. She will come in in 2 weeks for repeat assessment of the right proximal humerus with radiographs at that time. I will likely progress her activity in the right upper extremity at that time. This dictation was done with Dragon dictation and may contain mechanical errors related to translation.

## 2022-02-28 ENCOUNTER — CARE COORDINATION (OUTPATIENT)
Dept: CASE MANAGEMENT | Age: 72
End: 2022-02-28

## 2022-02-28 ENCOUNTER — TELEMEDICINE (OUTPATIENT)
Dept: FAMILY MEDICINE CLINIC | Age: 72
End: 2022-02-28
Payer: MEDICARE

## 2022-02-28 DIAGNOSIS — M54.50 CHRONIC BILATERAL LOW BACK PAIN WITHOUT SCIATICA: ICD-10-CM

## 2022-02-28 DIAGNOSIS — G89.29 CHRONIC BILATERAL LOW BACK PAIN WITHOUT SCIATICA: ICD-10-CM

## 2022-02-28 DIAGNOSIS — G89.4 CHRONIC PAIN SYNDROME: Primary | Chronic | ICD-10-CM

## 2022-02-28 PROCEDURE — 99213 OFFICE O/P EST LOW 20 MIN: CPT | Performed by: FAMILY MEDICINE

## 2022-02-28 ASSESSMENT — PATIENT HEALTH QUESTIONNAIRE - PHQ9
2. FEELING DOWN, DEPRESSED OR HOPELESS: 0
SUM OF ALL RESPONSES TO PHQ QUESTIONS 1-9: 0
1. LITTLE INTEREST OR PLEASURE IN DOING THINGS: 0
SUM OF ALL RESPONSES TO PHQ QUESTIONS 1-9: 0
SUM OF ALL RESPONSES TO PHQ QUESTIONS 1-9: 0
SUM OF ALL RESPONSES TO PHQ9 QUESTIONS 1 & 2: 0
SUM OF ALL RESPONSES TO PHQ QUESTIONS 1-9: 0

## 2022-02-28 NOTE — PROGRESS NOTES
2/28/2022    This is a 73 y.o. female   Chief Complaint   Patient presents with    Medication Check     HPI    Follow up chronic pain  - she is prescribed Norco 7.5mg TID PRN  - this is for osteoarthritis in multiple joints, including back pain and chronic pain syndrome  - she is able to get out and walk. Being out too much causes more knee pain  - able to drive and get out and do things  - she has had a cervical neck x-ray 2017 showing chronic degenerative change  - main side effect is chronic constipation  - enjoys walking, doing things around the home, trying to keep up with her grandson    Recently broke her humerus femur and had surgery. Dirk Nissen. Has home health PT coming out to her home and is recovering well. Chronic constipation  -Chronic, intermittent issue for years. Has been on daily stool softeners which has helped and often uses laxatives PRN    Review of Systems     Current Outpatient Medications   Medication Sig Dispense Refill    aspirin 81 MG tablet Take 1 tablet by mouth 2 times daily 30 tablet 3    diphenhydrAMINE (BENADRYL ALLERGY) 25 MG tablet Take 25 mg by mouth as needed for Itching      HYDROcodone-acetaminophen (NORCO) 7.5-325 MG per tablet Take 1 tablet by mouth every 8 hours as needed for Pain for up to 30 days.  Intended supply: 30 days 90 tablet 0    oxybutynin (DITROPAN-XL) 10 MG extended release tablet TAKE ONE TABLET BY MOUTH DAILY (Patient taking differently: Take 10 mg by mouth as needed ) 90 tablet 0    atorvastatin (LIPITOR) 20 MG tablet TAKE ONE TABLET BY MOUTH DAILY (Patient taking differently: Take 20 mg by mouth at bedtime ) 90 tablet 0    lisinopril (PRINIVIL;ZESTRIL) 5 MG tablet TAKE ONE TABLET BY MOUTH TWICE A  tablet 2    carvedilol (COREG) 3.125 MG tablet TAKE ONE TABLET BY MOUTH TWICE A DAY WITH A MEAL (Patient taking differently: Take 3.125 mg by mouth at bedtime ) 180 tablet 2    furosemide (LASIX) 40 MG tablet TAKE ONE TABLET BY MOUTH DAILY (Patient taking differently: Take 40 mg by mouth every evening ) 90 tablet 1    acetaminophen (TYLENOL) 325 MG tablet Take 650 mg by mouth every 4 hours as needed      diclofenac (CATAFLAM) 50 MG tablet TAKE ONE TABLET BY MOUTH TWICE A DAY AS NEEDED FOR PAIN 60 tablet 1     No current facility-administered medications for this visit. There were no vitals taken for this visit. Physical Exam    Wt Readings from Last 3 Encounters:   02/24/22 152 lb (68.9 kg)   02/13/22 152 lb 8.9 oz (69.2 kg)   02/10/22 148 lb (67.1 kg)     BP Readings from Last 3 Encounters:   02/13/22 123/69   02/11/22 116/64   11/17/21 120/72     Assessment/Plan:  1. Chronic pain syndrome    2. Chronic bilateral low back pain without sciatica    Overall her pain is well controlled. She recently had a fall and fractured her hip and arm and is s/p surgical repair and recovering nicely. Her chronic pain symptoms are adequately controlled by her pain medication. Controlled substance policy reviewed. Discussed side effects of constipation. OARRs report reviewed. No concerning activity. Reviewed risks of long-term opioid use. Once recovered from her surgery we discussed working toward dose reduction overall    F/u 3 months chronic pain    Hernan Toney, was evaluated through a synchronous (real-time) audio-video encounter. The patient (or guardian if applicable) is aware that this is a billable service, which includes applicable co-pays. This Virtual Visit was conducted with patient's (and/or legal guardian's) consent. The visit was conducted pursuant to the emergency declaration under the [address] [place] [address], [address] [place] [address] authority and the [employer] Act. Patient identification was verified, and a caregiver was present when appropriate. The patient was located at home in a state where the provider was licensed to provide care.        Total time spent for this encounter: Not billed by time    --Juanjose Guerrero MD on 2/28/2022 at 3:28 PM    An electronic signature was used to authenticate this note.

## 2022-02-28 NOTE — CARE COORDINATION
[facility] Transitions Follow Up Call    2022    Patient: Carmencita Vieira  Patient : 1950   MRN: [id number]  Reason for Admission: Right femur cephalomedullary nail  Discharge Date: 22 RARS: Readmission Risk Score: 9.4 ( )         Spoke with: HIPPA compliant message left for f/u transition call. Provided this RN contact information and acting CTNs contact information asking for return call. Will reach out at another time.        Follow Up  Future Appointments   Date Time Provider No Diane   2022  2:40 PM Orlena Essex, MD [facility]   3/10/2022  1:45 PM MD PETER Sood ORTHO [facility]       Madina Hunt RN

## 2022-03-01 ENCOUNTER — TELEPHONE (OUTPATIENT)
Dept: ORTHOPEDIC SURGERY | Age: 72
End: 2022-03-01

## 2022-03-01 ENCOUNTER — CARE COORDINATION (OUTPATIENT)
Dept: CASE MANAGEMENT | Age: 72
End: 2022-03-01

## 2022-03-01 NOTE — CARE COORDINATION
[facility] Transitions Follow Up Call    3/1/2022    Patient: Don Lee  Patient : 1950   MRN: [id number]  Reason for Admission:   Discharge Date: 22 RARS: Readmission Risk Score: 9.4 ( )         Spoke with: no one    2nd and final attempt at CT follow up phone call. Unable to reach patient. Left message. Informed Valaria Lobe this would be the final CTN outreach.     Follow Up  Future Appointments   Date Time Provider No Diane   3/10/2022  1:45 PM Heaven Barrera MD [facility] KRYSTYNA Mcclain RN BSN  Care Transition Nurse  [phone]

## 2022-03-01 NOTE — TELEPHONE ENCOUNTER
Dr Marcos Green, Does she the patient need to be wearing a brace for her proximal humerus fracture during therapy?

## 2022-03-01 NOTE — TELEPHONE ENCOUNTER
[address]   Contact Name: Francisco Cr Number: [phone]  Request or Information: the facility need to know if the patient need to wear her splint when she is doing here exercise and if there are new orders after her appt on 2/24/22 for her home exercise.  Please Advise

## 2022-03-02 ENCOUNTER — TELEPHONE (OUTPATIENT)
Dept: ORTHOPEDIC SURGERY | Age: 72
End: 2022-03-02

## 2022-03-02 NOTE — TELEPHONE ENCOUNTER
General Question     Subject: PATIENT REQUESTING ORDERS TO Madelaine Reeves [facility] PHYSICAL THERAPIST   Patient Danny Lauren [address]. Number: [phone]

## 2022-03-02 NOTE — TELEPHONE ENCOUNTER
I called the patient and she wants to cancel OT and only have PT. I called [address] and PT is already ordered and OT cancelled.

## 2022-03-10 ENCOUNTER — OFFICE VISIT (OUTPATIENT)
Dept: ORTHOPEDIC SURGERY | Age: 72
End: 2022-03-10

## 2022-03-10 VITALS — WEIGHT: 144 LBS | BODY MASS INDEX: 27.19 KG/M2 | HEIGHT: 61 IN

## 2022-03-10 DIAGNOSIS — S72.144D CLOSED NONDISPLACED INTERTROCHANTERIC FRACTURE OF RIGHT FEMUR WITH ROUTINE HEALING, SUBSEQUENT ENCOUNTER: ICD-10-CM

## 2022-03-10 DIAGNOSIS — S42.214D CLOSED NONDISPLACED FRACTURE OF SURGICAL NECK OF RIGHT HUMERUS WITH ROUTINE HEALING, UNSPECIFIED FRACTURE MORPHOLOGY, SUBSEQUENT ENCOUNTER: Primary | ICD-10-CM

## 2022-03-10 PROCEDURE — 99024 POSTOP FOLLOW-UP VISIT: CPT | Performed by: ORTHOPAEDIC SURGERY

## 2022-03-11 NOTE — PROGRESS NOTES
[facility]  Office Visit    Chief Complaint: Right proximal humerus fracture and right hip fracture    HPI:  Kelel Moody is a 73 y.o. who is here in follow-up of right shoulder and leg pain following a fall on January 28, 2022. She slipped on the snow and ice and landed on her right side. She is being treated nonoperatively for a right proximal humerus fracture. She was found to have a nondisplaced intertrochanteric femur fracture on MRI underwent cephalomedullary nail for 11/2022. She is walking with the use of a cane. She is here in a wheelchair today. She reports that both her right shoulder and hip feel better every day, although she did slip her hip last Friday, and more pain over the past 1 week. She does report muscle cramping for which she is taking muscle relaxer. She also chronically takes hydrocodone for pain. Patient Active Problem List   Diagnosis    Mixed hyperlipidemia    Anxiety    Primary osteoarthritis involving multiple joints    Essential hypertension    Restless legs syndrome (RLS)    Cardiomy[facility] ([facility] [address]    Chronic pain syndrome    Coronary artery disease involving native coronary artery of native heart without angina pectoris    Mixed incontinence    Paroxysmal atrial fibrillation (HCC)    Chronic low back pain without sciatica    Status post-operative repair of closed fracture of right hip    Fracture of femur, intertrochanteric, closed, right, initial encounter ([facility] [address]       ROS:  Constitutional: denies fever, chills, weight loss  MSK: denies pain in other joints, muscle aches  Neurological: denies numbness, tingling, weakness    Exam:  Appearance: sitting in exam room chair, appears to be in no acute distress, awake and alert  Resp: unlabored breathing on room air  Skin: warm, dry and intact with out erythema or significant increased temperature  Neuro: grossly intact both lower extremities. Intact sensation to light touch.  Motor exam 4+ to 5/5 in all major motor groups. RLE: Examination demonstrates negative logroll and negative Stinchfield. Incisions are well-healed. There is brisk capillary refill. Strength is 5/5 in hamstrings, quads, hip flexors  RUE: Minimal tenderness at the shoulder. She demonstrates active shoulder forward flexion to 60 degrees. Sensation intact light touch in axillary, radial, ulnar, median nerve distributions palpable radial pulse. Imaging:  3 views of the right shoulder were performed and interpreted today. There is a proximal humerus fracture that has acceptable alignment. There is interval callus formation. 3 views of the right hip were performed and interpreted today. There is a cephalomedullary nail in place. No changes in alignment compared to previous images. Assessment:  Right proximal humerus fracture, nonoperative management  Right hip intertrochanteric femur fracture s/p CMN    Plan:  We discussed the diagnosis and treatment options. She will continue nonoperative management of the right proximal humerus fracture as it is showing good signs of healing. She may progress to active shoulder range of motion with physical therapy. She may continue to be weightbearing as tolerated on the right lower extremity and continue physical therapy exercises. She will come return in 3-4 weeks for repeat assessment of the right proximal humerus and right hip with radiographs at that time. This dictation was done with Dragon dictation and may contain mechanical errors related to translation.

## 2022-03-30 ENCOUNTER — PATIENT MESSAGE (OUTPATIENT)
Dept: FAMILY MEDICINE CLINIC | Age: 72
End: 2022-03-30

## 2022-03-30 RX ORDER — ATORVASTATIN CALCIUM 20 MG/1
20 TABLET, FILM COATED ORAL NIGHTLY
Qty: 90 TABLET | Refills: 1 | Status: SHIPPED | OUTPATIENT
Start: 2022-03-30 | End: 2022-09-26

## 2022-03-30 NOTE — TELEPHONE ENCOUNTER
From: Simón Carey  To: Dr. Thanh Isaacs Day  Sent: 3/30/2022 9:34 AM EDT  Subject: Prescription refill    Please send in a refill for me for Atorvastatin 20mg 90 tablets to my [facility] in [place]. Phone number [phone].  Thank you

## 2022-03-31 ENCOUNTER — OFFICE VISIT (OUTPATIENT)
Dept: ORTHOPEDIC SURGERY | Age: 72
End: 2022-03-31

## 2022-03-31 ENCOUNTER — TELEPHONE (OUTPATIENT)
Dept: PHARMACY | Facility: CLINIC | Age: 72
End: 2022-03-31

## 2022-03-31 VITALS — HEIGHT: 61 IN | BODY MASS INDEX: 27.19 KG/M2 | WEIGHT: 144 LBS | RESPIRATION RATE: 18 BRPM

## 2022-03-31 DIAGNOSIS — S72.144D CLOSED NONDISPLACED INTERTROCHANTERIC FRACTURE OF RIGHT FEMUR WITH ROUTINE HEALING, SUBSEQUENT ENCOUNTER: Primary | ICD-10-CM

## 2022-03-31 DIAGNOSIS — S42.214D CLOSED NONDISPLACED FRACTURE OF SURGICAL NECK OF RIGHT HUMERUS WITH ROUTINE HEALING, UNSPECIFIED FRACTURE MORPHOLOGY, SUBSEQUENT ENCOUNTER: ICD-10-CM

## 2022-03-31 PROCEDURE — 99024 POSTOP FOLLOW-UP VISIT: CPT | Performed by: ORTHOPAEDIC SURGERY

## 2022-03-31 NOTE — TELEPHONE ENCOUNTER
Dana Rosa MD, please see below - would patient benefit from DEXA due to recent fracture? · Noted previously ordered, but patient postponed scheduling d/t COVID (appears order  Oct 2021)  · May also consider starting alendronate 70mg every 7 days given hip fx    If appropriate, please order and have your staff notify patient. Thank you,  Zoila Mackay, PharmD, [facility]  Department, toll free: [phone], option 1    ==================================================================  POPULATION HEALTH CLINICAL PHARMACY REVIEW: RECENT FRACTURE    Wilfrid Aguilar is a 73 y.o. old female patient who recently had a fracture of right humerus and intratrochanteric proximal right femoral fracture (s/p slip & fall, 22 [facility], ED visit and subsequent 22 MRI). No results found for: VITD25     Lab Results   Component Value Date    CALCIUM 9.2 2022    PHOS 3.4 2019     estimated creatinine clearance is 57 mL/min (based on SCr of 0.8 mg/dL). DEXA - none  - Ordered in  and , not completed - per 10/26/20 PCP note: \"Advised DEXA.  She'd like to wait until things are settled down with COVID before going to the hospital for this\"    FRAX-calculated 10-year fracture probability:   Per WHO calculator: major Osteoporotic = 19% and Hip = 4.1%    - Other insurer-identified rx measures, patient not listed for claims through 3/7/22:   o Per reconciled dispense hx: atorvastatin 90ds refilled 3/31/22 (relatively timely) and lisinopril 90ds refilled 3/10/22 (timely)    Assessment:   - AACE recommends BMD testing in adults who have a fracture at or after age 48; USPSTF and ACP recommend DEXA for women at or after age 72  · 73 y.o. female with recent fracture and may benefit from DEXA to assess current BMD - previously ordered, but patient postponed scheduling d/t COVID  - AACE recommends to initiate pharmacologic treatment in those with hip or vertebral fracture. - If targeting vitamin D  ng/ml: Unable to assess as no level noted.      Considerations:  - Suggest obtaining DEXA; may also consider starting alendronate 70mg every 7 days given hip fx  - Consider getting vitamin D level

## 2022-04-01 NOTE — PROGRESS NOTES
[facility]  Office Visit    Chief Complaint: Right proximal humerus fracture and right hip fracture    HPI:  Randy Davis is a 73 y.o. who is here in follow-up of right shoulder and leg pain following a fall on January 28, 2022. She slipped on the snow and ice and landed on her right side. She is being treated nonoperatively for a right proximal humerus fracture. She was found to have a nondisplaced intertrochanteric femur fracture on MRI underwent cephalomedullary nail for 11/2022. She is walking with the use of a cane. She also reports she can walk without the cane. Her right hip continues to improve. Her right proximal humerus fracture pain is also improving. She has had 3 therapy sessions and reports her range of motion is beginning to improve but overall she remains fairly stiff. Patient Active Problem List   Diagnosis    Mixed hyperlipidemia    Anxiety    Primary osteoarthritis involving multiple joints    Essential hypertension    Restless legs syndrome (RLS)    Cardiomyopathy ([facility] [address]    Chronic pain syndrome    Coronary artery disease involving native coronary artery of native heart without angina pectoris    Mixed incontinence    Paroxysmal atrial fibrillation (HCC)    Chronic low back pain without sciatica    Status post-operative repair of closed fracture of right hip    Fracture of femur, intertrochanteric, closed, right, initial encounter ([facility] [address]       Exam:  Appearance: sitting in exam room chair, appears to be in no acute distress, awake and alert  Resp: unlabored breathing on room air  Skin: warm, dry and intact with out erythema or significant increased temperature  Neuro: grossly intact both lower extremities. Intact sensation to light touch. Motor exam 4+ to 5/5 in all major motor groups. RLE: Examination demonstrates negative logroll and negative Stinchfield. There is brisk capillary refill.   Strength is 5/5 in hamstrings, quads, hip flexors  RUE: Minimal tenderness at the shoulder. She demonstrates active shoulder forward flexion to 90 degrees. Sensation intact light touch in axillary, radial, ulnar, median nerve distributions palpable radial pulse. Imaging:  3 views of the right shoulder were performed and interpreted today. There is a proximal humerus fracture that has acceptable alignment. There is abundant callus formation. 3 views of the right hip were performed and interpreted today. There is a cephalomedullary nail in place. No changes in alignment compared to previous images. Assessment:  Right proximal humerus fracture, nonoperative management  Right hip intertrochanteric femur fracture s/p CMN    Plan:  We discussed the diagnosis and treatment options. Her right shoulder is healing well and she will continue working with physical therapy to improve strength and range of motion. She may continue to be weightbearing as tolerated on the right lower extremity and continue physical therapy exercises. She will come return in 4 weeks for repeat assessment of the right proximal humerus with radiographs at that time. This dictation was done with Dragon dictation and may contain mechanical errors related to translation.

## 2022-04-07 NOTE — TELEPHONE ENCOUNTER
Dana Rosa MD, please see below - would patient benefit from DEXA due to recent fracture?   · Noted previously ordered, but patient postponed scheduling d/t COVID (appears order  Oct 2021)  · May also consider starting alendronate 70mg every 7 days given hip fx     If appropriate, please order and have your staff notify patient.     Thank you,  Julia Carmona, PharmD, [address] [place] [address]  Department, toll free: [phone], option 1

## 2022-04-07 NOTE — TELEPHONE ENCOUNTER
Qiana Garcia,    Thanks for your note. I'm working to have Isaiah Caleroas get her DEXA and start Fosamax - we are trying to get her in for an appointment to discuss this.     Sincerely,  Dr. Aracely Rushing

## 2022-04-08 NOTE — TELEPHONE ENCOUNTER
Noted, thank you for reviewing and responding!    =======================================================   For Pharmacy [address] in place:  No   Recommendation Provided To: Provider: 1 via Note to Provider   Gap Closed?: No    Intervention Accepted By: Provider: 1   Time Spent (min): 15

## 2022-04-10 ENCOUNTER — PATIENT MESSAGE (OUTPATIENT)
Dept: FAMILY MEDICINE CLINIC | Age: 72
End: 2022-04-10

## 2022-04-10 DIAGNOSIS — M15.9 PRIMARY OSTEOARTHRITIS INVOLVING MULTIPLE JOINTS: Chronic | ICD-10-CM

## 2022-04-10 DIAGNOSIS — G89.4 CHRONIC PAIN SYNDROME: Chronic | ICD-10-CM

## 2022-04-11 RX ORDER — HYDROCODONE BITARTRATE AND ACETAMINOPHEN 7.5; 325 MG/1; MG/1
1 TABLET ORAL EVERY 8 HOURS PRN
Qty: 90 TABLET | Refills: 0 | Status: SHIPPED | OUTPATIENT
Start: 2022-04-11 | End: 2022-05-11 | Stop reason: SDUPTHER

## 2022-04-11 NOTE — TELEPHONE ENCOUNTER
From: Edmar Candelaria  To: Dr. Oneill Press Day  Sent: 4/10/2022 1:32 AM EDT  Subject: Hydrocodone refill    Please send in my refill of Hydrocodone-Acetaminophen 7.5. Qty. 90 to my [facility] [address].    Thank you

## 2022-04-21 DIAGNOSIS — S72.144D CLOSED NONDISPLACED INTERTROCHANTERIC FRACTURE OF RIGHT FEMUR WITH ROUTINE HEALING, SUBSEQUENT ENCOUNTER: Primary | ICD-10-CM

## 2022-04-21 DIAGNOSIS — S42.214D CLOSED NONDISPLACED FRACTURE OF SURGICAL NECK OF RIGHT HUMERUS WITH ROUTINE HEALING, UNSPECIFIED FRACTURE MORPHOLOGY, SUBSEQUENT ENCOUNTER: ICD-10-CM

## 2022-05-02 ENCOUNTER — OFFICE VISIT (OUTPATIENT)
Dept: ORTHOPEDIC SURGERY | Age: 72
End: 2022-05-02

## 2022-05-02 VITALS
BODY MASS INDEX: 28.13 KG/M2 | SYSTOLIC BLOOD PRESSURE: 129 MMHG | DIASTOLIC BLOOD PRESSURE: 76 MMHG | HEIGHT: 61 IN | WEIGHT: 149 LBS | HEART RATE: 72 BPM

## 2022-05-02 DIAGNOSIS — S72.144D CLOSED NONDISPLACED INTERTROCHANTERIC FRACTURE OF RIGHT FEMUR WITH ROUTINE HEALING, SUBSEQUENT ENCOUNTER: Primary | ICD-10-CM

## 2022-05-02 DIAGNOSIS — S42.214D CLOSED NONDISPLACED FRACTURE OF SURGICAL NECK OF RIGHT HUMERUS WITH ROUTINE HEALING, UNSPECIFIED FRACTURE MORPHOLOGY, SUBSEQUENT ENCOUNTER: ICD-10-CM

## 2022-05-02 PROCEDURE — 99024 POSTOP FOLLOW-UP VISIT: CPT | Performed by: PHYSICIAN ASSISTANT

## 2022-05-03 NOTE — PROGRESS NOTES
This dictation was done with [employer] dictation and may contain mechanical errors related to translation. Blood pressure 129/76, pulse 72, height 5' 1\" (1.549 m), weight 149 lb (67.6 kg), not currently breastfeeding. This is a pleasant 73 female who is here almost 3 months after right hip ORIF for an inotrope fracture and healing right proximal humerus fracture. She is starting to get around fairly well now putting full weight on her right leg and using a cane as needed. She states that she has 0-10 pain and she was sent for x-rays including an AP and lateral right hip as well as a 3 view right proximal humerus with internal and external rotation. On examination she has good  strength good wrist extension strength and good capillary refill to her right hand. She can actively abduct to about 85 has passively 160 degrees of forward flexion she has weakness supraspinous strength testing but can touch her opposite hip and the top of her head.   Her right hip has good hip flexion abduction strength she is neurologically intact to the right foot with good dorsiflexion and plantar flexion strength    My impression is stable healing right proximal humerus fracture and healing right hip ORIF    At this point we talked about continuing with the exercises to strengthen both her shoulder and her hip and she will follow-up with us sometime in the next 6 months

## 2022-05-23 ENCOUNTER — PATIENT MESSAGE (OUTPATIENT)
Dept: ORTHOPEDIC SURGERY | Age: 72
End: 2022-05-23

## 2022-05-24 RX ORDER — TIZANIDINE 4 MG/1
4 TABLET ORAL 3 TIMES DAILY
Qty: 60 TABLET | Refills: 0 | Status: SHIPPED | OUTPATIENT
Start: 2022-05-24 | End: 2022-06-13

## 2022-05-24 NOTE — TELEPHONE ENCOUNTER
From: Declan Hill  To: Dr. Francisco Cisneros: 5/23/2022 6:33 PM EDT  Subject: Prescription refill     Would you please call in a refill for muscle relaxant Tizanidine HCL 4 mg tablet 60 tablets. I need them for my leg. Since they started more exercises for my hip   the muscles in my thigh sometimes get overworked and get so bad that I am back to using crutches at times to take the weight off my operated leg. My pharmacy is [facility] in [place]. The phone number is [phone].    Thank you

## 2022-06-02 ENCOUNTER — OFFICE VISIT (OUTPATIENT)
Dept: FAMILY MEDICINE CLINIC | Age: 72
End: 2022-06-02
Payer: MEDICARE

## 2022-06-02 VITALS
SYSTOLIC BLOOD PRESSURE: 130 MMHG | RESPIRATION RATE: 16 BRPM | BODY MASS INDEX: 28.02 KG/M2 | OXYGEN SATURATION: 98 % | WEIGHT: 148.4 LBS | HEIGHT: 61 IN | DIASTOLIC BLOOD PRESSURE: 70 MMHG | HEART RATE: 84 BPM

## 2022-06-02 DIAGNOSIS — Z78.0 POSTMENOPAUSAL STATE: Primary | ICD-10-CM

## 2022-06-02 DIAGNOSIS — M54.50 CHRONIC BILATERAL LOW BACK PAIN WITHOUT SCIATICA: ICD-10-CM

## 2022-06-02 DIAGNOSIS — G89.29 CHRONIC BILATERAL LOW BACK PAIN WITHOUT SCIATICA: ICD-10-CM

## 2022-06-02 DIAGNOSIS — Z87.81 HISTORY OF HIP FRACTURE: ICD-10-CM

## 2022-06-02 DIAGNOSIS — I48.0 PAROXYSMAL ATRIAL FIBRILLATION (HCC): ICD-10-CM

## 2022-06-02 DIAGNOSIS — M15.9 PRIMARY OSTEOARTHRITIS INVOLVING MULTIPLE JOINTS: Chronic | ICD-10-CM

## 2022-06-02 PROCEDURE — 1123F ACP DISCUSS/DSCN MKR DOCD: CPT | Performed by: FAMILY MEDICINE

## 2022-06-02 PROCEDURE — 99214 OFFICE O/P EST MOD 30 MIN: CPT | Performed by: FAMILY MEDICINE

## 2022-06-02 SDOH — ECONOMIC STABILITY: FOOD INSECURITY: WITHIN THE PAST 12 MONTHS, THE FOOD YOU BOUGHT JUST DIDN'T LAST AND YOU DIDN'T HAVE MONEY TO GET MORE.: NEVER TRUE

## 2022-06-02 SDOH — ECONOMIC STABILITY: FOOD INSECURITY: WITHIN THE PAST 12 MONTHS, YOU WORRIED THAT YOUR FOOD WOULD RUN OUT BEFORE YOU GOT MONEY TO BUY MORE.: NEVER TRUE

## 2022-06-02 ASSESSMENT — PATIENT HEALTH QUESTIONNAIRE - PHQ9
SUM OF ALL RESPONSES TO PHQ QUESTIONS 1-9: 0
1. LITTLE INTEREST OR PLEASURE IN DOING THINGS: 0
SUM OF ALL RESPONSES TO PHQ QUESTIONS 1-9: 0
2. FEELING DOWN, DEPRESSED OR HOPELESS: 0
SUM OF ALL RESPONSES TO PHQ9 QUESTIONS 1 & 2: 0

## 2022-06-02 ASSESSMENT — SOCIAL DETERMINANTS OF HEALTH (SDOH): HOW HARD IS IT FOR YOU TO PAY FOR THE VERY BASICS LIKE FOOD, HOUSING, MEDICAL CARE, AND HEATING?: NOT HARD AT ALL

## 2022-06-02 NOTE — PROGRESS NOTES
6/2/2022    This is a 73 y.o. female   Chief Complaint   Patient presents with    Medication Check     Patient is here for a medication follow up      HPI    Here for f/u for med check    HTN  BP Readings from Last 3 Encounters:   06/02/22 130/70   05/02/22 129/76   02/13/22 123/69     History of femoral and humeral fractures  -After fall, needing evaluation for osteoporosis. Paroxysmal atrial fibrillation  -Sees cardiology. Declined anticoagulation. Chronic pain:  Osteoarthritis of multiple joints. Chronic bilateral low back pain. She takes hydrocodone has been on this for many years. She denies any side effects from medication. She reports the medication helps her function well and follow around her grandchildren. Review of Systems     Current Outpatient Medications   Medication Sig Dispense Refill    tiZANidine (ZANAFLEX) 4 MG tablet Take 1 tablet by mouth 3 times daily for 20 days 60 tablet 0    HYDROcodone-acetaminophen (NORCO) 7.5-325 MG per tablet Take 1 tablet by mouth every 8 hours as needed for Pain for up to 30 days.  Intended supply: 30 days 90 tablet 0    atorvastatin (LIPITOR) 20 MG tablet Take 1 tablet by mouth at bedtime 90 tablet 1    aspirin 81 MG tablet Take 1 tablet by mouth 2 times daily 30 tablet 3    diphenhydrAMINE (BENADRYL ALLERGY) 25 MG tablet Take 25 mg by mouth as needed for Itching      oxybutynin (DITROPAN-XL) 10 MG extended release tablet TAKE ONE TABLET BY MOUTH DAILY (Patient taking differently: Take 10 mg by mouth as needed ) 90 tablet 0    lisinopril (PRINIVIL;ZESTRIL) 5 MG tablet TAKE ONE TABLET BY MOUTH TWICE A  tablet 2    carvedilol (COREG) 3.125 MG tablet TAKE ONE TABLET BY MOUTH TWICE A DAY WITH A MEAL (Patient taking differently: Take 3.125 mg by mouth at bedtime ) 180 tablet 2    acetaminophen (TYLENOL) 325 MG tablet Take 650 mg by mouth every 4 hours as needed      diclofenac (CATAFLAM) 50 MG tablet TAKE ONE TABLET BY MOUTH TWICE A DAY AS NEEDED FOR PAIN 60 tablet 1    furosemide (LASIX) 40 MG tablet TAKE ONE TABLET BY MOUTH DAILY (Patient taking differently: Take 40 mg by mouth every evening ) 90 tablet 1     No current facility-administered medications for this visit. /70 (Site: Right Upper Arm, Position: Sitting, Cuff Size: Large Adult)   Pulse 84   Resp 16   Ht 5' 1\" (1.549 m)   Wt 148 lb 6.4 oz (67.3 kg)   SpO2 98%   BMI 28.04 kg/m²     Physical Exam    Wt Readings from Last 3 Encounters:   06/02/22 148 lb 6.4 oz (67.3 kg)   05/02/22 149 lb (67.6 kg)   03/31/22 144 lb (65.3 kg)       BP Readings from Last 3 Encounters:   06/02/22 130/70   05/02/22 129/76   02/13/22 123/69         Assessment/Plan:  1. Postmenopausal state  - DEXA BONE DENSITY AXIAL SKELETON; Future    2. History of hip fracture  Warrants a DEXA scan. We discussed potential treatments including Fosamax and Prolia. If her scan is notable for osteoporosis we will work to approve Prolia because she often has GI side effects to medications  - DEXA BONE DENSITY AXIAL SKELETON; Future    3. Primary osteoarthritis involving multiple joints  We reviewed the pain contract in detail. We discussed the goal of weaning down from 3 times a day to 2 times a day over the next few months. We reviewed some potential side effects of chronic opioid use. OARRS report reviewed. Urine drug screen up-to-date    4. Chronic bilateral low back pain without sciatica  As above    5. Paroxysmal atrial fibrillation ([address]  She has declined anticoagulation      Return in about 3 months (around 9/2/2022) for chronic pain f/u.

## 2022-06-09 ENCOUNTER — OFFICE VISIT (OUTPATIENT)
Dept: ORTHOPEDIC SURGERY | Age: 72
End: 2022-06-09

## 2022-06-09 VITALS — BODY MASS INDEX: 27.75 KG/M2 | HEIGHT: 61 IN | WEIGHT: 147 LBS

## 2022-06-09 DIAGNOSIS — S42.214D CLOSED NONDISPLACED FRACTURE OF SURGICAL NECK OF RIGHT HUMERUS WITH ROUTINE HEALING, UNSPECIFIED FRACTURE MORPHOLOGY, SUBSEQUENT ENCOUNTER: Primary | ICD-10-CM

## 2022-06-09 PROCEDURE — 99213 OFFICE O/P EST LOW 20 MIN: CPT | Performed by: ORTHOPAEDIC SURGERY

## 2022-06-09 PROCEDURE — 1123F ACP DISCUSS/DSCN MKR DOCD: CPT | Performed by: ORTHOPAEDIC SURGERY

## 2022-06-10 RX ORDER — FUROSEMIDE 40 MG/1
40 TABLET ORAL DAILY PRN
Qty: 30 TABLET | Refills: 11 | Status: SHIPPED | OUTPATIENT
Start: 2022-06-10

## 2022-06-10 NOTE — PROGRESS NOTES
[facility]  Office Visit    Chief Complaint: Right proximal humerus fracture follow-up    HPI:  Zhanna Bailey is a 73 y.o. who is here in follow-up of a right proximal humerus fracture that she sustained on January 28, 2022. She also had a right hip fracture that was treated operatively. She is doing well overall. She comes in today hoping to advance her physical therapy for her right shoulder. She had some concerns that was not fully healed. She does report that her range of motion continues to slowly improve. Overall, she still has weakness and stiffness of the right shoulder and hip. Patient Active Problem List   Diagnosis    Mixed hyperlipidemia    Anxiety    Primary osteoarthritis involving multiple joints    Essential hypertension    Restless legs syndrome (RLS)    Cardiomyopathy ([facility] [address]    Chronic pain syndrome    Coronary artery disease involving native coronary artery of native heart without angina pectoris    Mixed incontinence    Paroxysmal atrial fibrillation (HCC) - declines anticoagulation    Chronic low back pain without sciatica    Status post-operative repair of closed fracture of right hip    Fracture of femur, intertrochanteric, closed, right, initial encounter ([facility]       Exam:  Appearance: sitting in exam room chair, appears to be in no acute distress, awake and alert  Resp: unlabored breathing on room air  Skin: warm, dry and intact with out erythema or significant increased temperature  Neuro: grossly intact both lower extremities. Intact sensation to light touch. Motor exam 4+ to 5/5 in all major motor groups. RUE: Minimal tenderness at the shoulder. She demonstrates active shoulder forward flexion to 120 degrees, external rotation at the neutral position to 25 degrees, internal rotation to the back pocket. Sensation intact light touch in axillary, radial, ulnar, median nerve distributions palpable radial pulse.     Imaging:  3 views of the right shoulder were performed and interpreted today. She has a proximal humerus fracture in anatomic alignment that is healed. Assessment:  Right proximal humerus fracture, healed with    Plan:  Her right shoulder is healing well. She may progress with physical therapy to work on strengthening and full range of motion activities. She has no restrictions at this point. She will also continue physical therapy for right hip strengthening. She will follow-up in 3 months for repeat assessment of both the right shoulder and right hip or sooner if needed. Total time spent on today's encounter was at least 22 minutes. This time included reviewing prior notes, radiographs, and lab results when available, reviewing history obtained by medical assistant, performing history and physical exam, reviewing tests/radiographs with the patient, counseling the patient, ordering medications or tests, documentation in the electronic health record, and coordination of care. This dictation was done with Dragon dictation and may contain mechanical errors related to translation.

## 2022-06-13 ENCOUNTER — TELEPHONE (OUTPATIENT)
Dept: OTHER | Facility: CLINIC | Age: 72
End: 2022-06-13

## 2022-06-13 NOTE — TELEPHONE ENCOUNTER
Pt was contacted today as part of [facility] to schedule a Mammogram.       I left a message reminding the patient that they have an open order from Linda Staley MD and to please contact me directly at [phone] to schedule a Mammogram.     Thanks,  Chrys Baumgarten, LPN

## 2022-06-24 ENCOUNTER — OFFICE VISIT (OUTPATIENT)
Dept: CARDIOLOGY CLINIC | Age: 72
End: 2022-06-24
Payer: MEDICARE

## 2022-06-24 VITALS
OXYGEN SATURATION: 97 % | SYSTOLIC BLOOD PRESSURE: 136 MMHG | HEIGHT: 61 IN | WEIGHT: 148 LBS | BODY MASS INDEX: 27.94 KG/M2 | DIASTOLIC BLOOD PRESSURE: 74 MMHG | HEART RATE: 78 BPM

## 2022-06-24 DIAGNOSIS — R42 LIGHTHEADED: ICD-10-CM

## 2022-06-24 DIAGNOSIS — I48.0 PAROXYSMAL ATRIAL FIBRILLATION (HCC): Primary | ICD-10-CM

## 2022-06-24 DIAGNOSIS — I10 ESSENTIAL HYPERTENSION: ICD-10-CM

## 2022-06-24 DIAGNOSIS — R20.0 LEFT ARM NUMBNESS: ICD-10-CM

## 2022-06-24 DIAGNOSIS — E78.5 HYPERLIPIDEMIA, UNSPECIFIED HYPERLIPIDEMIA TYPE: ICD-10-CM

## 2022-06-24 DIAGNOSIS — I42.8 NICM (NONISCHEMIC CARDIOMYOPATHY) (HCC): ICD-10-CM

## 2022-06-24 DIAGNOSIS — I25.10 CORONARY ARTERY DISEASE INVOLVING NATIVE CORONARY ARTERY OF NATIVE HEART WITHOUT ANGINA PECTORIS: ICD-10-CM

## 2022-06-24 PROCEDURE — 99214 OFFICE O/P EST MOD 30 MIN: CPT | Performed by: INTERNAL MEDICINE

## 2022-06-24 PROCEDURE — 1123F ACP DISCUSS/DSCN MKR DOCD: CPT | Performed by: INTERNAL MEDICINE

## 2022-06-24 RX ORDER — ASPIRIN 81 MG/1
81 TABLET ORAL DAILY
COMMUNITY

## 2022-06-24 NOTE — PROGRESS NOTES
[facility]   Cardiology Progress Note    CC: \"I have no heart symptoms. \"     History of present illness: Stephenie Damian is a 73 y.o. female with past medical history significant for hypertension, hyperlipidemia and was recently diagnosed with non ischemic cardiomyopathy (9/2015). She does not want to proceed with AFIB ablation therapy. 11/17/21 follow up, the patient reported that she continues with chronic PEARCE that she feels is unchanged with bending over, climbing stairs. She feels water pills helped her abdominal fullness and chest pressure but she is not on GI medical therapy for constipation and has f/u end of this month. Patient denies exertional chest pain/pressure, dyspnea at rest, changes in PEARCE, PND, orthopnea, palpitations, lightheadedness, weight changes, changes in LE edema, and syncope. She admits to medical therapy compliance and tolerating. Today, she currently denies any new or recurrent cardiac sounding complaints. Notes positional left arm numbness when laying or positional. Patient denies exertional chest pain/pressure, dyspnea at rest, PEARCE, PND, orthopnea, palpitations, lightheadedness, weight changes, changes in LE edema, and syncope. She admits to medial therapy compliance and tolerating.        Past Medical History:  Past Medical History:   Diagnosis Date    Anxiety     Arthritis     Broken nose     CHF (congestive heart failure) (HCC)     DJD (degenerative joint disease)     Essential hypertension 6/8/2012    Hyperlipidemia     Hypertension 6/8/2012    Mixed hyperlipidemia     Primary osteoarthritis involving multiple joints     Restless legs syndrome (RLS) 11/24/2014     Past Surgical History:     Past Surgical History:   Procedure Laterality Date    BREAST ENHANCEMENT SURGERY      CATARACT REMOVAL WITH IMPLANT Bilateral 2015    HIP FRACTURE SURGERY Right 2/11/2022    OPEN REDUCTION INTERNAL FIXATION WITH NAIL RIGHT HIP performed by Tj Cross MD at [facility] OR    WRIST FRACTURE SURGERY  4/10/12    Right     Social History:    Social History     Tobacco Use    Smoking status: Former Smoker     Packs/day: 1.50     Years: 25.00     Pack years: 37.50     Quit date: 10/27/2004     Years since quittin.6    Smokeless tobacco: Never Used   Substance Use Topics    Alcohol use: No     Review of Systems:   Constitutional: No major weight gain or loss, fatigue, weakness, night sweats or fever. HEENT: No new vision difficulties or ringing in the ears. Respiratory: No new SOB, PND, orthopnea or cough. Cardiovascular: See HPI , denies CP  GI: No n/v, diarrhea, constipation, abdominal pain or changes in bowel habits. No melena, no hematochezia  : No urinary frequency, urgency, incontinence, hematuria or dysuria. Skin: No cyanosis or skin lesions. Musculoskeletal: No new muscle or joint pain. Neurological: No syncope. Psychiatric: No anxiety, insomnia or depression    Current Outpatient Medications   Medication Sig Dispense Refill    furosemide (LASIX) 40 MG tablet Take 1 tablet by mouth daily as needed (SOB, weight gain, BLE edema) TAKE ONE TABLET BY MOUTH DAILY 30 tablet 11    HYDROcodone-acetaminophen (NORCO) 7.5-325 MG per tablet Take 1 tablet by mouth every 8 hours as needed for Pain for up to 30 days.  Intended supply: 30 days 90 tablet 0    atorvastatin (LIPITOR) 20 MG tablet Take 1 tablet by mouth at bedtime 90 tablet 1    aspirin 81 MG tablet Take 1 tablet by mouth 2 times daily 30 tablet 3    diphenhydrAMINE (BENADRYL ALLERGY) 25 MG tablet Take 25 mg by mouth as needed for Itching      oxybutynin (DITROPAN-XL) 10 MG extended release tablet TAKE ONE TABLET BY MOUTH DAILY (Patient taking differently: Take 10 mg by mouth as needed ) 90 tablet 0    lisinopril (PRINIVIL;ZESTRIL) 5 MG tablet TAKE ONE TABLET BY MOUTH TWICE A  tablet 2    carvedilol (COREG) 3.125 MG tablet TAKE ONE TABLET BY MOUTH TWICE A DAY WITH A MEAL (Patient taking differently: Take 3.125 mg by mouth at bedtime ) 180 tablet 2    acetaminophen (TYLENOL) 325 MG tablet Take 650 mg by mouth every 4 hours as needed      diclofenac (CATAFLAM) 50 MG tablet TAKE ONE TABLET BY MOUTH TWICE A DAY AS NEEDED FOR PAIN 60 tablet 1     No current facility-administered medications for this visit. No Known Allergies      PHYSICAL EXAMINATION:   Constitutional: She is oriented to person, place, and time. She is elderly and pleasant. In no acute distress. HEENT: Normocephalic and atraumatic. Sclerae anicteric. No xanthelasmas. EOM's intact. Neck: Supple. No JVD present. Carotids without bruits. No thyromegaly present. No lymphadenopathy present. Cardiovascular: RRR, normal S1 and S2; no murmur/gallop or rub  Pulmonary/Chest: Effort normal.  Lungs clear to auscultation. Chest wall nontender  Abdominal: soft, nontender, nondistended. + bowel sounds; no hepatomegaly Extremities: No cyanosis, or clubbing. Pulses are 2+ radial/DP/PT bilaterally. Cap refill brisk. + varicosities to lower legs bilaterally. no edema. Neurological: No focal deficit. Skin: Skin is warm and dry. Psychiatric: She has a normal mood and affect. Her speech is normal and behavior is normal.       Labs:   Lab Results   Component Value Date    WBC 8.0 04/24/2019    HGB 13.8 01/27/2022    HCT 39.3 04/24/2019    MCV 91.8 04/24/2019     04/24/2019     Lab Results   Component Value Date     01/27/2022    K 3.9 01/27/2022    CL 97 01/27/2022    CO2 28 01/27/2022    BUN 9 01/27/2022    CREATININE 0.8 01/27/2022    GLUCOSE 111 01/27/2022    CALCIUM 9.2 01/27/2022      Lab Results   Component Value Date    TRIG 115 09/21/2017    HDL 46 07/07/2021    HDL 66 10/05/2011    LDLCALC 120 07/07/2021    LABVLDL 32 07/07/2021       Diagnostics:    EKG:    3/19/19: Sinus Rhythm with left bundle branch block.     9/14/20: not completed due to VV  6/16/21: SR with LBBB, ~60 bpm   11/17/21: SR with LBBB   6/24/22: not completed ECHO 3/23/16  Summary  Left ventricular size is mildly increased. Normal left ventricular wall  thickness. Hypokinesis of the anteroseptal, and mid septal wall. Left  ventricular function is reduced with ejection fraction estimated at 35-40%. There is reversal of E/A inflow velocities across the mitral valve  suggesting impaired left ventricular relaxation. ECHO 6/1/18  Left ventricular cavity size is normal. Normal left ventricular wall  thickness. Ejection fraction is visually estimated to be 50-55%. No regional  wall motion abnormalities are noted. Normal diastolic function. Mitral valve leaflets appear mildly thickened. Trivial mitral regurgitation. The aortic valve is mildly thickened/calcified but opens well with normal gradients. Trivial aortic regurgitation. Mild tricuspid regurgitation. IVC size is normal (<2.1cm) and collapses > 50% with respiration consistent  with normal RA pressure (3mmHg). Estimated pulmonary artery systolic pressure is normal at 26 mmHg assuming a  right atrial pressure of 3 mmHg. [facility] 9/25/2015:  1. Right coronary artery rises from the right sinus of Valsalva. It is a moderate-sized artery. It divides into the posterior descending and posterolateral branches. Right coronary artery and its branches are free of atherosclerosis. 2. Left main trunk is short and _____ divided into left anterior descending and left circumflex artery. Left main trunk appears to have mild atherosclerotic plaquing but it does not appear hemodynamically significant. 3. Left anterior descending artery: It is a moderate-sized artery. It descends into the intraventricular sulcus and wraps around the apex of the heart. The left anterior descending and its branches are free of any atherosclerosis. 4. Left circumflex artery. It arises from the left main trunk. It is a  moderate-sized artery and gives rise to a moderate-sized obtuse marginal branch.  Left circumflex artery and its branches are free of atherosclerosis. 5. Left ventriculogram reveals a left ventricular enlargement with global left ventricular systolic dysfunction. Estimated ejection fraction is about  30% to 35%. No mitral regurgitation or intracavitary clots identified. Echo 9/25/2015:  Left ventricle size is normal.  Normal left ventricular wall thickness. Ejection fraction is severely reduced & visually estimated to be 25 to 30%. There is reversal of E/A inflow velocities across the mitral valve suggesting impaired left ventricular relaxation. There is abnormal (paradoxical) septal motion consistent with left bundle branch block. Trivial MR,AI & TR. Myocardial perfusion scan 9/24/2015:  Decreased activity within the anteroseptal wall and apex on stress    images suggesting stress-induced ischemia      ECHO 6/1/18  Left ventricular cavity size is normal. Normal left ventricular wall  thickness. Ejection fraction is visually estimated to be 50-55%. No regional  wall motion abnormalities are noted. Normal diastolic function. Mitral valve leaflets appear mildly thickened. Trivial mitral regurgitation. The aortic valve is mildly thickened/calcified but opens well with normal  gradients. Trivial aortic regurgitation. Mild tricuspid regurgitation. IVC size is normal (<2.1cm) and collapses > 50% with respiration consistent  with normal RA pressure (3mmHg). Estimated pulmonary artery systolic pressure is normal at 26 mmHg assuming a  right atrial pressure of 3 mmHg. 30 day event monitor 7/3/19  Sinus rhythm. One or more episodes of sustained afib. At least 2-3 episodes of regular interval QRS tachycardia, most likely atrial flutter    Echo:3/12/20  Normal left ventricle size, wall thickness, and systolic function with an   estimated ejection fraction of 55-60%. No regional wall motion abnormalities are seen. Mitral annular calcification is present. Trivial mitral regurgitation is present.    Mild tricuspid regurgitation. Echo: 1/27/22  Suboptimal image quality. Normal left ventricle size, wall thickness, and systolic function with an   estimated ejection fraction of 55%. No regional wall motion abnormalities are seen. Normal right ventricular size and function. Trivial aortic, mitral, and tricuspid regurgitation. Assessment and Plan:    Atrial fibrillation  -She did wear a 30 day event monitor 7/2019 which did showed episode of atrial fibrillation and most likely atrial flutter.   -She also was evaluated per Dr. Deisy Gutiérrez and was offered a flutter/fibrillation ablation, she did not proceed with ablation.   -She also stopped taking her Eliquis.  -she continues to deny breakthrough AFIB, symptoms or TIA/CVA like symptoms and has remained stable since our last visit. -physical exam shows normal sinus rhythm  -I have once again discussed with her increased risk of a stroke without taking the Eliquis since her chads Vasc score is at least 3. She has declined both eliquis and ablation therapy. Will continue to monitor. Nonischemic cardiomyopathy.   -She denies PEARCE   -she is playing tennis routinely   -She will continue her current dose of Coreg and Lisinopril.   -I have instructed to take lisinopril at lunchtime. -LVEF is 55-60% on most recent echo from 3/2020. E.   -BMP with next blood draw. NYHA Class I-II    Coronary artery disease  -She denies any symptoms of angina today and has remained stable with no symptoms reported. -Continue with medical management and risk factor modification including aspirin, statin, and B-blocker. -BMP 7/7/21 stable. -We will repeat BMP and magnesium with next blood draw    Essential hypertension. Blood pressure is stable today in the office. Continue current medical therapy. Hyperlipidemia. She denies myalgias and has remained stable. 7/7/21 LDLc 120, . Currently on Lipitor 20 mg daily.    -Will repeat lipid/liver profile prior to next visit. Shortness of breath. No evidence of heart failure or reported symptoms at rest, exertion, PND, orthonpea. Syncope  Resolved    Fatigue  resolved    Left arm numbness with positions and laying down, once she gets up and moves it around, symptoms resolve. Will check routine labs. We will plan follow up between 6-8 months. Thank you very much for allowing me to participate in the care of your patient. Please do not hesitate to contact me if you have any questions.     Sincerely,  Ahmet Olvera MD      [place] [address]  Ph: [phone]  Fax: [phone]    This note was scribed in the presence of Dr. Gala Michelle MD by Anamaria Byrd RN.      6/24/2022

## 2022-06-27 ENCOUNTER — PATIENT MESSAGE (OUTPATIENT)
Dept: FAMILY MEDICINE CLINIC | Age: 72
End: 2022-06-27

## 2022-06-27 RX ORDER — DICLOFENAC POTASSIUM 50 MG/1
TABLET, FILM COATED ORAL
Qty: 60 TABLET | Refills: 1 | Status: SHIPPED | OUTPATIENT
Start: 2022-06-27 | End: 2022-08-30 | Stop reason: SDUPTHER

## 2022-06-27 NOTE — TELEPHONE ENCOUNTER
From: Adrien Romero  To: Dr. Kimber Wolfe Day  Sent: 6/27/2022 3:18 AM EDT  Subject: Refill for Diclofenac Pot 50mg tab    Please send in a refill for Diclofenac Pot 50mg tab. to my [employer].    Thank you

## 2022-07-13 ENCOUNTER — HOSPITAL ENCOUNTER (OUTPATIENT)
Dept: WOMENS IMAGING | Age: 72
Discharge: HOME | End: 2022-07-13
Payer: MEDICARE

## 2022-07-13 DIAGNOSIS — Z78.0 POSTMENOPAUSAL STATE: ICD-10-CM

## 2022-07-13 DIAGNOSIS — Z87.81 HISTORY OF HIP FRACTURE: ICD-10-CM

## 2022-07-13 PROCEDURE — 77080 DXA BONE DENSITY AXIAL: CPT

## 2022-07-19 DIAGNOSIS — M80.00XD AGE-RELATED OSTEOPOROSIS WITH CURRENT PATHOLOGICAL FRACTURE WITH ROUTINE HEALING: ICD-10-CM

## 2022-07-19 RX ORDER — VITAMIN B COMPLEX
2 TABLET ORAL DAILY
Qty: 60 TABLET | Refills: 5 | Status: SHIPPED | OUTPATIENT
Start: 2022-07-19 | End: 2022-08-30 | Stop reason: SDUPTHER

## 2022-07-27 RX ORDER — SODIUM CHLORIDE 9 MG/ML
INJECTION, SOLUTION INTRAVENOUS CONTINUOUS
OUTPATIENT
Start: 2022-07-27

## 2022-07-27 RX ORDER — ALBUTEROL SULFATE 90 UG/1
4 AEROSOL, METERED RESPIRATORY (INHALATION) PRN
OUTPATIENT
Start: 2022-07-27

## 2022-07-27 RX ORDER — DIPHENHYDRAMINE HYDROCHLORIDE 50 MG/ML
50 INJECTION INTRAMUSCULAR; INTRAVENOUS
OUTPATIENT
Start: 2022-07-27

## 2022-07-27 RX ORDER — EPINEPHRINE 1 MG/ML
0.3 INJECTION, SOLUTION, CONCENTRATE INTRAVENOUS PRN
OUTPATIENT
Start: 2022-07-27

## 2022-07-27 RX ORDER — ACETAMINOPHEN 325 MG/1
650 TABLET ORAL
OUTPATIENT
Start: 2022-07-27

## 2022-07-27 RX ORDER — ONDANSETRON 2 MG/ML
8 INJECTION INTRAMUSCULAR; INTRAVENOUS
OUTPATIENT
Start: 2022-07-27

## 2022-07-29 ENCOUNTER — TELEPHONE (OUTPATIENT)
Dept: ORTHOPEDIC SURGERY | Age: 72
End: 2022-07-29

## 2022-08-08 ENCOUNTER — PATIENT MESSAGE (OUTPATIENT)
Dept: FAMILY MEDICINE CLINIC | Age: 72
End: 2022-08-08

## 2022-08-08 DIAGNOSIS — G89.4 CHRONIC PAIN SYNDROME: Chronic | ICD-10-CM

## 2022-08-08 DIAGNOSIS — M15.9 PRIMARY OSTEOARTHRITIS INVOLVING MULTIPLE JOINTS: Chronic | ICD-10-CM

## 2022-08-09 RX ORDER — HYDROCODONE BITARTRATE AND ACETAMINOPHEN 7.5; 325 MG/1; MG/1
1 TABLET ORAL EVERY 8 HOURS PRN
Qty: 90 TABLET | Refills: 0 | Status: SHIPPED | OUTPATIENT
Start: 2022-08-09 | End: 2022-09-07 | Stop reason: SDUPTHER

## 2022-08-09 NOTE — TELEPHONE ENCOUNTER
From: Chilo Garner  To: Dr. Boyce Nickel Day  Sent: 8/8/2022 8:10 PM EDT  Subject: Refill prescription    Please send in my refill of Hydrocodone-Acetaminophen 7.5. Qty. 90 to my [facility] [address] [id number].    Thank you

## 2022-08-30 DIAGNOSIS — M80.00XD AGE-RELATED OSTEOPOROSIS WITH CURRENT PATHOLOGICAL FRACTURE WITH ROUTINE HEALING: ICD-10-CM

## 2022-08-30 RX ORDER — DICLOFENAC POTASSIUM 50 MG/1
TABLET, FILM COATED ORAL
Qty: 60 TABLET | Refills: 1 | OUTPATIENT
Start: 2022-08-30

## 2022-08-30 RX ORDER — VITAMIN B COMPLEX
2 TABLET ORAL DAILY
Qty: 60 TABLET | Refills: 5 | Status: SHIPPED | OUTPATIENT
Start: 2022-08-30

## 2022-08-30 RX ORDER — DICLOFENAC POTASSIUM 50 MG/1
TABLET, FILM COATED ORAL
Qty: 60 TABLET | Refills: 1 | Status: SHIPPED | OUTPATIENT
Start: 2022-08-30

## 2022-09-06 RX ORDER — CARVEDILOL 3.12 MG/1
3.12 TABLET ORAL NIGHTLY
Qty: 90 TABLET | Refills: 3 | Status: SHIPPED | OUTPATIENT
Start: 2022-09-06

## 2022-09-07 ENCOUNTER — PATIENT MESSAGE (OUTPATIENT)
Dept: FAMILY MEDICINE CLINIC | Age: 72
End: 2022-09-07

## 2022-09-07 DIAGNOSIS — G89.4 CHRONIC PAIN SYNDROME: Chronic | ICD-10-CM

## 2022-09-07 DIAGNOSIS — R05.9 COUGH: Primary | ICD-10-CM

## 2022-09-07 DIAGNOSIS — M15.9 PRIMARY OSTEOARTHRITIS INVOLVING MULTIPLE JOINTS: Chronic | ICD-10-CM

## 2022-09-07 RX ORDER — HYDROCODONE BITARTRATE AND ACETAMINOPHEN 7.5; 325 MG/1; MG/1
1 TABLET ORAL EVERY 8 HOURS PRN
Qty: 90 TABLET | Refills: 0 | Status: SHIPPED | OUTPATIENT
Start: 2022-09-07 | End: 2022-10-10 | Stop reason: SDUPTHER

## 2022-09-08 ENCOUNTER — PATIENT MESSAGE (OUTPATIENT)
Dept: FAMILY MEDICINE CLINIC | Age: 72
End: 2022-09-08

## 2022-09-26 RX ORDER — ATORVASTATIN CALCIUM 20 MG/1
TABLET, FILM COATED ORAL
Qty: 90 TABLET | Refills: 1 | Status: SHIPPED | OUTPATIENT
Start: 2022-09-26

## 2022-09-28 ENCOUNTER — OFFICE VISIT (OUTPATIENT)
Dept: FAMILY MEDICINE CLINIC | Age: 72
End: 2022-09-28
Payer: MEDICARE

## 2022-09-28 VITALS
HEART RATE: 66 BPM | DIASTOLIC BLOOD PRESSURE: 74 MMHG | SYSTOLIC BLOOD PRESSURE: 120 MMHG | WEIGHT: 149 LBS | HEIGHT: 61 IN | RESPIRATION RATE: 16 BRPM | BODY MASS INDEX: 28.13 KG/M2 | OXYGEN SATURATION: 98 %

## 2022-09-28 DIAGNOSIS — R39.198 SUBJECTIVE CHANGE IN URINATION: ICD-10-CM

## 2022-09-28 DIAGNOSIS — G89.4 CHRONIC PAIN SYNDROME: Chronic | ICD-10-CM

## 2022-09-28 DIAGNOSIS — Z12.11 SCREEN FOR COLON CANCER: ICD-10-CM

## 2022-09-28 DIAGNOSIS — I25.10 CORONARY ARTERY DISEASE INVOLVING NATIVE CORONARY ARTERY OF NATIVE HEART WITHOUT ANGINA PECTORIS: ICD-10-CM

## 2022-09-28 DIAGNOSIS — Z79.899 CONTROLLED SUBSTANCE AGREEMENT SIGNED: ICD-10-CM

## 2022-09-28 DIAGNOSIS — M80.00XD AGE-RELATED OSTEOPOROSIS WITH CURRENT PATHOLOGICAL FRACTURE WITH ROUTINE HEALING: ICD-10-CM

## 2022-09-28 DIAGNOSIS — M15.9 PRIMARY OSTEOARTHRITIS INVOLVING MULTIPLE JOINTS: Primary | Chronic | ICD-10-CM

## 2022-09-28 DIAGNOSIS — M89.9 DISORDER OF BONE, UNSPECIFIED: ICD-10-CM

## 2022-09-28 LAB

## 2022-09-28 PROCEDURE — 1123F ACP DISCUSS/DSCN MKR DOCD: CPT | Performed by: FAMILY MEDICINE

## 2022-09-28 PROCEDURE — 99214 OFFICE O/P EST MOD 30 MIN: CPT | Performed by: FAMILY MEDICINE

## 2022-09-28 PROCEDURE — 80305 DRUG TEST PRSMV DIR OPT OBS: CPT | Performed by: FAMILY MEDICINE

## 2022-09-28 NOTE — PROGRESS NOTES
9/28/2022    This is a 73 y.o. female   Chief Complaint   Patient presents with    Follow-up     Pt states she feels much better she thinks she passed a kidney stone      HPI    Here for follow up    Recently last week, had pain in her R flank and side. Noticed a change in urination, had a hard time going and noted more urinary dribbling. No blood in the urine. Now, symptoms have resolved. No unusual odor or cloudy urine. No more dysuria (which was present previously)    Recently had COVID with some significant rebound symptoms but symptoms have now fully resolved    Pain contract   - has known diffuse OA involving multiple joints. Chronic low back pain. Takes medications to help keep up with daily activities and to be with her grandson. Denies any significant side effects. Working hard to stay physically active, for example, on her treadmill  - pain from her hip fracture has significantly improved    DEXA scan with osteoporosis  - hip fracture earlier this year    HTN  BP Readings from Last 3 Encounters:   09/28/22 120/74   06/24/22 136/74   06/02/22 130/70     Review of Systems     Current Outpatient Medications   Medication Sig Dispense Refill    atorvastatin (LIPITOR) 20 MG tablet TAKE ONE TABLET BY MOUTH EVERY NIGHT AT BEDTIME 90 tablet 1    HYDROcodone-acetaminophen (NORCO) 7.5-325 MG per tablet Take 1 tablet by mouth every 8 hours as needed for Pain for up to 30 days.  Intended supply: 30 days 90 tablet 0    carvedilol (COREG) 3.125 MG tablet Take 1 tablet by mouth at bedtime 90 tablet 3    diclofenac (CATAFLAM) 50 MG tablet TAKE ONE TABLET BY MOUTH TWICE A DAY AS NEEDED FOR PAIN 60 tablet 1    Calcium Carb-Cholecalciferol (CALCIUM-VITAMIN D) 500-400 MG-UNIT TABS Take 2 tablets by mouth daily 60 tablet 5    aspirin 81 MG EC tablet Take 81 mg by mouth daily      furosemide (LASIX) 40 MG tablet Take 1 tablet by mouth daily as needed (SOB, weight gain, BLE edema) TAKE ONE TABLET BY MOUTH DAILY 30 tablet 11 diphenhydrAMINE (BENADRYL) 25 MG tablet Take 25 mg by mouth as needed for Itching      oxybutynin (DITROPAN-XL) 10 MG extended release tablet TAKE ONE TABLET BY MOUTH DAILY (Patient taking differently: Take 10 mg by mouth as needed) 90 tablet 0    acetaminophen (TYLENOL) 325 MG tablet Take 650 mg by mouth every 4 hours as needed       No current facility-administered medications for this visit. /74 (Site: Left Upper Arm, Position: Sitting, Cuff Size: Medium Adult)   Pulse 66   Resp 16   Ht 5' 1\" (1.549 m)   Wt 149 lb (67.6 kg)   SpO2 98%   BMI 28.15 kg/m²     Physical Exam    Wt Readings from Last 3 Encounters:   09/28/22 149 lb (67.6 kg)   06/24/22 148 lb (67.1 kg)   06/09/22 147 lb (66.7 kg)     BP Readings from Last 3 Encounters:   09/28/22 120/74   06/24/22 136/74   06/02/22 130/70     Assessment/Plan:  1. Primary osteoarthritis involving multiple joints  - POCT Rapid Drug Screen    2. Chronic pain syndrome  - POCT Rapid Drug Screen    3. Subjective change in urination    4. Screen for colon cancer    5. Age-related osteoporosis with current pathological fracture with routine healing - DEXA 2022, declines treatment  - Vitamin D 25 Hydroxy; Future    6. Controlled substance agreement signed  - POCT Rapid Drug Screen    7. Coronary artery disease involving native coronary artery of native heart without angina pectoris  - Comprehensive Metabolic Panel; Future  - Lipid Panel; Future  - CBC; Future    8. Disorder of bone, unspecified   - Vitamin D 25 Hydroxy; Future      Reviewed controlled substance policy. Current pain medication helps her function on a day-to-day basis, helps with her arthritis symptoms, enables her to keep up with her grandson. She is not experiencing any side effects from medication. We have reviewed potential for dependence and abuse. [place] report reviewed without concerning signs. Pain contract renewed today.   Urine drug screen performed today    Declines treatment for osteoporosis with prescription medication, continue calcium and vitamin D    Urinary symptoms have resolved    Routine labs as above

## 2022-12-11 ENCOUNTER — PATIENT MESSAGE (OUTPATIENT)
Dept: FAMILY MEDICINE CLINIC | Age: 72
End: 2022-12-11

## 2022-12-11 DIAGNOSIS — G89.4 CHRONIC PAIN SYNDROME: Chronic | ICD-10-CM

## 2022-12-11 DIAGNOSIS — M15.9 PRIMARY OSTEOARTHRITIS INVOLVING MULTIPLE JOINTS: Chronic | ICD-10-CM

## 2022-12-12 RX ORDER — HYDROCODONE BITARTRATE AND ACETAMINOPHEN 7.5; 325 MG/1; MG/1
1 TABLET ORAL EVERY 8 HOURS PRN
Qty: 90 TABLET | Refills: 0 | Status: SHIPPED | OUTPATIENT
Start: 2022-12-12 | End: 2023-01-11

## 2022-12-12 NOTE — TELEPHONE ENCOUNTER
From: Corby Westbrook  To: Dr. Bashir Villarreal Day  Sent: 12/11/2022 4:45 PM EST  Subject: Prescription refill    Please send in my refill of Hydrocodone-Acetaminophen 7.5. Qty. 90 to my [facility] [phone].    Thank you

## 2022-12-13 ENCOUNTER — COMMUNITY OUTREACH (OUTPATIENT)
Dept: FAMILY MEDICINE CLINIC | Age: 72
End: 2022-12-13

## 2022-12-15 DIAGNOSIS — M89.9 DISORDER OF BONE, UNSPECIFIED: ICD-10-CM

## 2022-12-15 DIAGNOSIS — I25.10 CORONARY ARTERY DISEASE INVOLVING NATIVE CORONARY ARTERY OF NATIVE HEART WITHOUT ANGINA PECTORIS: ICD-10-CM

## 2022-12-15 DIAGNOSIS — M80.00XD AGE-RELATED OSTEOPOROSIS WITH CURRENT PATHOLOGICAL FRACTURE WITH ROUTINE HEALING: ICD-10-CM

## 2022-12-15 DIAGNOSIS — R20.0 LEFT ARM NUMBNESS: ICD-10-CM

## 2022-12-15 LAB
A/G RATIO: 1.8 (ref 1.1–2.2)
ALBUMIN SERPL-MCNC: 4.2 G/DL (ref 3.4–5)
ALP BLD-CCNC: 169 U/L (ref 40–129)
ALT SERPL-CCNC: 13 U/L (ref 10–40)
ANION GAP SERPL CALCULATED.3IONS-SCNC: 11 MMOL/L (ref 3–16)
AST SERPL-CCNC: 18 U/L (ref 15–37)
BILIRUB SERPL-MCNC: 0.4 MG/DL (ref 0–1)
BUN BLDV-MCNC: 9 MG/DL (ref 7–20)
CALCIUM SERPL-MCNC: 9.1 MG/DL (ref 8.3–10.6)
CHLORIDE BLD-SCNC: 105 MMOL/L (ref 99–110)
CHOLESTEROL, TOTAL: 200 MG/DL (ref 0–199)
CO2: 25 MMOL/L (ref 21–32)
CREAT SERPL-MCNC: 0.8 MG/DL (ref 0.6–1.2)
GFR SERPL CREATININE-BSD FRML MDRD: >60 ML/MIN/{1.73_M2}
GLUCOSE BLD-MCNC: 107 MG/DL (ref 70–99)
HCT VFR BLD CALC: 40.4 % (ref 36–48)
HDLC SERPL-MCNC: 55 MG/DL (ref 40–60)
HEMOGLOBIN: 13.7 G/DL (ref 12–16)
LDL CHOLESTEROL CALCULATED: 118 MG/DL
MAGNESIUM: 2.1 MG/DL (ref 1.8–2.4)
MCH RBC QN AUTO: 30.3 PG (ref 26–34)
MCHC RBC AUTO-ENTMCNC: 33.9 G/DL (ref 31–36)
MCV RBC AUTO: 89.4 FL (ref 80–100)
PDW BLD-RTO: 14.1 % (ref 12.4–15.4)
PLATELET # BLD: 300 K/UL (ref 135–450)
PMV BLD AUTO: 7.3 FL (ref 5–10.5)
POTASSIUM SERPL-SCNC: 4.3 MMOL/L (ref 3.5–5.1)
RBC # BLD: 4.52 M/UL (ref 4–5.2)
SODIUM BLD-SCNC: 141 MMOL/L (ref 136–145)
TOTAL PROTEIN: 6.6 G/DL (ref 6.4–8.2)
TRIGL SERPL-MCNC: 135 MG/DL (ref 0–150)
VITAMIN D 25-HYDROXY: 12.4 NG/ML
VLDLC SERPL CALC-MCNC: 27 MG/DL
WBC # BLD: 9.4 K/UL (ref 4–11)

## 2022-12-16 ENCOUNTER — OFFICE VISIT (OUTPATIENT)
Dept: FAMILY MEDICINE CLINIC | Age: 72
End: 2022-12-16
Payer: MEDICARE

## 2022-12-16 ENCOUNTER — OFFICE VISIT (OUTPATIENT)
Dept: ORTHOPEDIC SURGERY | Age: 72
End: 2022-12-16

## 2022-12-16 VITALS
BODY MASS INDEX: 28.7 KG/M2 | DIASTOLIC BLOOD PRESSURE: 74 MMHG | SYSTOLIC BLOOD PRESSURE: 144 MMHG | HEIGHT: 61 IN | WEIGHT: 152 LBS

## 2022-12-16 VITALS
RESPIRATION RATE: 16 BRPM | DIASTOLIC BLOOD PRESSURE: 80 MMHG | BODY MASS INDEX: 28.89 KG/M2 | SYSTOLIC BLOOD PRESSURE: 142 MMHG | HEART RATE: 65 BPM | OXYGEN SATURATION: 97 % | WEIGHT: 153 LBS | HEIGHT: 61 IN

## 2022-12-16 DIAGNOSIS — I10 ESSENTIAL HYPERTENSION: Chronic | ICD-10-CM

## 2022-12-16 DIAGNOSIS — S42.214D CLOSED NONDISPLACED FRACTURE OF SURGICAL NECK OF RIGHT HUMERUS WITH ROUTINE HEALING, UNSPECIFIED FRACTURE MORPHOLOGY, SUBSEQUENT ENCOUNTER: Primary | ICD-10-CM

## 2022-12-16 DIAGNOSIS — S72.144A CLOSED NONDISPLACED INTERTROCHANTERIC FRACTURE OF RIGHT FEMUR, INITIAL ENCOUNTER (HCC): ICD-10-CM

## 2022-12-16 DIAGNOSIS — R79.89 LOW VITAMIN D LEVEL: ICD-10-CM

## 2022-12-16 DIAGNOSIS — M15.9 PRIMARY OSTEOARTHRITIS INVOLVING MULTIPLE JOINTS: Primary | Chronic | ICD-10-CM

## 2022-12-16 DIAGNOSIS — E78.2 MIXED HYPERLIPIDEMIA: ICD-10-CM

## 2022-12-16 PROCEDURE — 3074F SYST BP LT 130 MM HG: CPT | Performed by: FAMILY MEDICINE

## 2022-12-16 PROCEDURE — 99214 OFFICE O/P EST MOD 30 MIN: CPT | Performed by: FAMILY MEDICINE

## 2022-12-16 PROCEDURE — 1123F ACP DISCUSS/DSCN MKR DOCD: CPT | Performed by: FAMILY MEDICINE

## 2022-12-16 PROCEDURE — 3078F DIAST BP <80 MM HG: CPT | Performed by: FAMILY MEDICINE

## 2022-12-16 RX ORDER — TIZANIDINE 4 MG/1
4 TABLET ORAL EVERY 6 HOURS PRN
COMMUNITY
End: 2022-12-16 | Stop reason: SDUPTHER

## 2022-12-16 RX ORDER — TIZANIDINE 4 MG/1
4 TABLET ORAL EVERY 6 HOURS PRN
Qty: 30 TABLET | Refills: 2 | Status: SHIPPED | OUTPATIENT
Start: 2022-12-16

## 2022-12-16 RX ORDER — ATORVASTATIN CALCIUM 40 MG/1
TABLET, FILM COATED ORAL
Qty: 90 TABLET | Refills: 1 | Status: SHIPPED | OUTPATIENT
Start: 2022-12-16

## 2022-12-16 NOTE — PROGRESS NOTES
[facility]  Office Visit    Chief Complaint: Right proximal humerus fracture and right hip fracture    HPI:  Tyrone Carr is a 81 y.o. who is here in follow-up of right shoulder and leg pain following a fall on January 28, 2022. She slipped on the snow and ice and landed on her right side. She is being treated nonoperatively for a right proximal humerus fracture. She was found to have a nondisplaced intertrochanteric femur fracture on MRI and underwent cephalomedullary nail on 2/11/2022. She comes in today in follow-up. Her pain continues to improve. She has occasional muscle spasm pain in the right hip. Her range of motion in the shoulder continues to improve as well. She is now doing self-directed physical therapy exercises. She walks without assistive device. He does have occasional shoulder pain. Patient Active Problem List   Diagnosis    Mixed hyperlipidemia    Anxiety    Primary osteoarthritis involving multiple joints    Essential hypertension    Restless legs syndrome (RLS)    Cardiomyopathy (HCC)    Chronic pain syndrome    Coronary artery disease involving native coronary artery of native heart without angina pectoris    Mixed incontinence    Paroxysmal atrial fibrillation (HCC) - declines anticoagulation    Chronic low back pain without sciatica    Status post-operative repair of closed fracture of right hip    Fracture of femur, intertrochanteric, closed, right, initial encounter ([facility] [address]    Age-related osteoporosis with current pathological fracture with routine healing - DEXA 2022, declines treatment       Exam:  Appearance: sitting in exam room chair, appears to be in no acute distress, awake and alert  Resp: unlabored breathing on room air  Skin: warm, dry and intact with out erythema or significant increased temperature  Neuro: grossly intact both lower extremities. Intact sensation to light touch. Motor exam 4+ to 5/5 in all major motor groups.   RLE: Examination demonstrates negative logroll and negative Stinchfield. There is brisk capillary refill. Strength is 5/5 in hamstrings, quads, hip flexors  RUE: Minimal tenderness at the shoulder. She demonstrates active shoulder forward flexion to 145 degrees, external rotation at the neutral position at 30 degrees, internal rotation of the lumbar spine. Sensation intact light touch in axillary, radial, ulnar, median nerve distributions palpable radial pulse. Imaging:  3 views of the right shoulder were performed and interpreted today. There is a proximal humerus fracture that has healed. She has maintained glenohumeral joint space. There are degenerative changes at the greater tuberosity. 3 views of the right hip were performed and interpreted today. There is a cephalomedullary nail in place. No changes in alignment compared to previous images. Assessment:  Right proximal humerus fracture, healed  Right hip intertrochanteric femur fracture s/p CMN    Plan:  We discussed the diagnosis and treatment options. Both injuries are healing well. She will continue activity as tolerated and self-directed therapy exercises. A prescription of tizanidine was refilled today as this is helpful for her right hip muscle spasms. She will follow-up here as needed. Total time spent on today's encounter was at least 24 minutes. This time included reviewing prior notes, radiographs, and lab results when available, reviewing history obtained by medical assistant, performing history and physical exam, reviewing tests/radiographs with the patient, counseling the patient, ordering medications or tests, documentation in the electronic health record, and coordination of care. This dictation was done with Dragon dictation and may contain mechanical errors related to translation.

## 2022-12-16 NOTE — PROGRESS NOTES
12/16/2022    This is a 81 y.o. female   Chief Complaint   Patient presents with    Hypertension     Pt is doing wel      HPI    HTN  BP Readings from Last 3 Encounters:   12/16/22 (!) 144/74   12/16/22 (!) 142/80   09/28/22 120/74     HLD  Lab Results   Component Value Date    LDLCALC 118 (H) 12/15/2022   On Lipitor 20mg    Chronic pain  -She has osteoarthritis of multiple joints. More recently, history of femoral fracture. Overall she reports she is doing well. She takes hydrocodone/acetaminophen 7.5/325 up to 3 times daily as needed for pain. She reports no side effects from medication. The medication helps her function, improve her mobility, allows her to keep up with her grandson more easily    Review of Systems     Current Outpatient Medications   Medication Sig Dispense Refill    tiZANidine (ZANAFLEX) 4 MG tablet Take 4 mg by mouth every 6 hours as needed      atorvastatin (LIPITOR) 40 MG tablet TAKE ONE TABLET BY MOUTH EVERY NIGHT AT BEDTIME 90 tablet 1    HYDROcodone-acetaminophen (NORCO) 7.5-325 MG per tablet Take 1 tablet by mouth every 8 hours as needed for Pain for up to 30 days.  Intended supply: 30 days 90 tablet 0    carvedilol (COREG) 3.125 MG tablet Take 1 tablet by mouth at bedtime 90 tablet 3    diclofenac (CATAFLAM) 50 MG tablet TAKE ONE TABLET BY MOUTH TWICE A DAY AS NEEDED FOR PAIN 60 tablet 1    Calcium Carb-Cholecalciferol (CALCIUM-VITAMIN D) 500-400 MG-UNIT TABS Take 2 tablets by mouth daily 60 tablet 5    aspirin 81 MG EC tablet Take 81 mg by mouth daily      furosemide (LASIX) 40 MG tablet Take 1 tablet by mouth daily as needed (SOB, weight gain, BLE edema) TAKE ONE TABLET BY MOUTH DAILY 30 tablet 11    diphenhydrAMINE (BENADRYL) 25 MG tablet Take 25 mg by mouth as needed for Itching      oxybutynin (DITROPAN-XL) 10 MG extended release tablet TAKE ONE TABLET BY MOUTH DAILY (Patient taking differently: Take 10 mg by mouth as needed) 90 tablet 0    acetaminophen (TYLENOL) 325 MG tablet Take 650 mg by mouth every 4 hours as needed       No current facility-administered medications for this visit. BP (!) 142/80   Pulse 65   Resp 16   Ht 5' 1\" (1.549 m)   Wt 153 lb (69.4 kg)   SpO2 97%   BMI 28.91 kg/m²     Physical Exam    Wt Readings from Last 3 Encounters:   12/16/22 152 lb (68.9 kg)   12/16/22 153 lb (69.4 kg)   09/28/22 149 lb (67.6 kg)     BP Readings from Last 3 Encounters:   12/16/22 (!) 144/74   12/16/22 (!) 142/80   09/28/22 120/74     Assessment/Plan:  1. Primary osteoarthritis involving multiple joints  She takes Norco 7.5/325 3 times daily as needed for pain. She reports it helps keep her functional, keep up with her grandson. Her mobility is good. She denies side effects from medication. We reviewed controlled substance policy in detail, potential side effects, potential for abuse/sedation, potential for dependency. [facility]ARRs report does not have concerning findings. Urine drug screen and drug contract are up-to-date (9/28//22)    2. Low vitamin D level  Given context of her osteoporosis, advised treatment. She has declined osteoporosis prescription medications    3. Mixed hyperlipidemia  LDL still above 100. Increase Lipitor from 20 to 40 mg and reviewed potential side effects  - atorvastatin (LIPITOR) 40 MG tablet; TAKE ONE TABLET BY MOUTH EVERY NIGHT AT BEDTIME  Dispense: 90 tablet; Refill: 1    4. Essential hypertension  Slightly elevated today. We will continue to monitor, for now maintain current regimen    Return in about 3 months (around 3/16/2023).

## 2022-12-30 ENCOUNTER — PATIENT MESSAGE (OUTPATIENT)
Dept: FAMILY MEDICINE CLINIC | Age: 72
End: 2022-12-30

## 2023-01-20 RX ORDER — TIZANIDINE 4 MG/1
TABLET ORAL
Qty: 30 TABLET | Refills: 2 | Status: SHIPPED | OUTPATIENT
Start: 2023-01-20

## 2023-04-05 ENCOUNTER — TELEMEDICINE (OUTPATIENT)
Dept: FAMILY MEDICINE CLINIC | Age: 73
End: 2023-04-05
Payer: MEDICARE

## 2023-04-05 DIAGNOSIS — M15.9 PRIMARY OSTEOARTHRITIS INVOLVING MULTIPLE JOINTS: Primary | Chronic | ICD-10-CM

## 2023-04-05 DIAGNOSIS — I10 ESSENTIAL HYPERTENSION: Chronic | ICD-10-CM

## 2023-04-05 DIAGNOSIS — M62.838 MUSCLE SPASM: ICD-10-CM

## 2023-04-05 DIAGNOSIS — G89.4 CHRONIC PAIN SYNDROME: Chronic | ICD-10-CM

## 2023-04-05 PROCEDURE — 1123F ACP DISCUSS/DSCN MKR DOCD: CPT | Performed by: FAMILY MEDICINE

## 2023-04-05 PROCEDURE — 99213 OFFICE O/P EST LOW 20 MIN: CPT | Performed by: FAMILY MEDICINE

## 2023-04-05 RX ORDER — LISINOPRIL 5 MG/1
5 TABLET ORAL DAILY
Qty: 90 TABLET | Refills: 3
Start: 2023-04-05

## 2023-04-05 RX ORDER — HYDROCODONE BITARTRATE AND ACETAMINOPHEN 7.5; 325 MG/1; MG/1
1 TABLET ORAL EVERY 8 HOURS PRN
Qty: 90 TABLET | Refills: 0 | Status: SHIPPED | OUTPATIENT
Start: 2023-04-05 | End: 2023-05-05

## 2023-04-05 RX ORDER — TIZANIDINE 4 MG/1
TABLET ORAL
Qty: 30 TABLET | Refills: 5 | Status: SHIPPED | OUTPATIENT
Start: 2023-04-05

## 2023-04-05 SDOH — ECONOMIC STABILITY: FOOD INSECURITY: WITHIN THE PAST 12 MONTHS, YOU WORRIED THAT YOUR FOOD WOULD RUN OUT BEFORE YOU GOT MONEY TO BUY MORE.: NEVER TRUE

## 2023-04-05 SDOH — ECONOMIC STABILITY: HOUSING INSECURITY
IN THE LAST 12 MONTHS, WAS THERE A TIME WHEN YOU DID NOT HAVE A STEADY PLACE TO SLEEP OR SLEPT IN A SHELTER (INCLUDING NOW)?: NO

## 2023-04-05 SDOH — ECONOMIC STABILITY: INCOME INSECURITY: HOW HARD IS IT FOR YOU TO PAY FOR THE VERY BASICS LIKE FOOD, HOUSING, MEDICAL CARE, AND HEATING?: NOT HARD AT ALL

## 2023-04-05 SDOH — ECONOMIC STABILITY: FOOD INSECURITY: WITHIN THE PAST 12 MONTHS, THE FOOD YOU BOUGHT JUST DIDN'T LAST AND YOU DIDN'T HAVE MONEY TO GET MORE.: NEVER TRUE

## 2023-04-05 ASSESSMENT — PATIENT HEALTH QUESTIONNAIRE - PHQ9
2. FEELING DOWN, DEPRESSED OR HOPELESS: 1
SUM OF ALL RESPONSES TO PHQ QUESTIONS 1-9: 1
SUM OF ALL RESPONSES TO PHQ9 QUESTIONS 1 & 2: 1
1. LITTLE INTEREST OR PLEASURE IN DOING THINGS: 0
SUM OF ALL RESPONSES TO PHQ QUESTIONS 1-9: 1

## 2023-04-05 NOTE — PROGRESS NOTES
4/5/2023    This is a 81 y.o. female   Chief Complaint   Patient presents with    Knee Pain     Knee pain in both knees, right worse. Needs helping getting up. HPI      Chronic pain  -She has osteoarthritis of multiple joints. More recently, history of femoral fracture. Overall she reports she is doing well. She takes hydrocodone/acetaminophen 7.5/325 up to 3 times daily as needed for pain. She reports no side effects from medication. The medication helps her function, improve her mobility, allows her to keep up with her grandson more easily    Since her hip repair, has intermittent muscle spasms on that side. Responds well to Zanaflex    HTN  BP Readings from Last 3 Encounters:   12/16/22 (!) 144/74   12/16/22 (!) 142/80   09/28/22 120/74   On carvedilol and lisinopril  Notes readings at home are typically 724-581 systolic  Sees Cardiology    Review of Systems     Current Outpatient Medications   Medication Sig Dispense Refill    HYDROcodone-acetaminophen (NORCO) 7.5-325 MG per tablet Take 1 tablet by mouth every 8 hours as needed for Pain for up to 30 days.  Intended supply: 30 days 90 tablet 0    tiZANidine (ZANAFLEX) 4 MG tablet TAKE ONE TABLET BY MOUTH EVERY 6 HOURS AS NEEDED FOR MUSCLE SPASMS 30 tablet 2    atorvastatin (LIPITOR) 40 MG tablet TAKE ONE TABLET BY MOUTH EVERY NIGHT AT BEDTIME 90 tablet 1    carvedilol (COREG) 3.125 MG tablet Take 1 tablet by mouth at bedtime 90 tablet 3    diclofenac (CATAFLAM) 50 MG tablet TAKE ONE TABLET BY MOUTH TWICE A DAY AS NEEDED FOR PAIN 60 tablet 1    Calcium Carb-Cholecalciferol (CALCIUM-VITAMIN D) 500-400 MG-UNIT TABS Take 2 tablets by mouth daily 60 tablet 5    aspirin 81 MG EC tablet Take 1 tablet by mouth daily      furosemide (LASIX) 40 MG tablet Take 1 tablet by mouth daily as needed (SOB, weight gain, BLE edema) TAKE ONE TABLET BY MOUTH DAILY 30 tablet 11    diphenhydrAMINE (BENADRYL) 25 MG tablet Take 1 tablet by mouth as needed for Itching

## 2023-05-09 ENCOUNTER — PATIENT MESSAGE (OUTPATIENT)
Dept: FAMILY MEDICINE CLINIC | Age: 73
End: 2023-05-09

## 2023-05-09 DIAGNOSIS — G89.4 CHRONIC PAIN SYNDROME: Chronic | ICD-10-CM

## 2023-05-09 DIAGNOSIS — M15.9 PRIMARY OSTEOARTHRITIS INVOLVING MULTIPLE JOINTS: Chronic | ICD-10-CM

## 2023-05-09 RX ORDER — HYDROCODONE BITARTRATE AND ACETAMINOPHEN 7.5; 325 MG/1; MG/1
1 TABLET ORAL EVERY 8 HOURS PRN
Qty: 90 TABLET | Refills: 0 | Status: SHIPPED | OUTPATIENT
Start: 2023-05-09 | End: 2023-06-08

## 2023-05-09 NOTE — TELEPHONE ENCOUNTER
From: King Schmidt  To: Dr. Greg Kamara Day  Sent: 5/9/2023 11:06 AM EDT  Subject: Prescription refill    Please send in my refill of Hydrocodone-Acetaminophen 7.5. Qty. 90 to my [id number].    Thank you

## 2023-06-06 RX ORDER — FUROSEMIDE 40 MG/1
TABLET ORAL
Qty: 90 TABLET | Refills: 0 | Status: SHIPPED | OUTPATIENT
Start: 2023-06-06

## 2023-06-06 NOTE — TELEPHONE ENCOUNTER
Last OV: 6/24/22  Next OV: X  Last refill: 6/10/22  #30 11 R/F  Most recent Labs: 12/15/22  Last EKG (if needed): 11/17/21    Called patient left message to call and set up her 6-8 month follow up with Dr Steve Talley

## 2023-06-28 NOTE — TELEPHONE ENCOUNTER
From: Roshni Rosales  To: Ollie Wilson MD  Sent: 7/7/2021 4:35 PM EDT  Subject: Test Results Question    The results of my knee x rays are in along with the abdominal ultrasound. I'd like to see Dr John Song asap regarding my knees in hopes that maybe something can be done before I go on an active adventure vacation with my son and grandson in just 3 weeks. I saw Dr. John Song for my shoulders when the muscles  and had fallen down in my arms from caring for my mom a few years back. He gave me cortisone shots in each with exercise routines to follow. He said if that didn't help he would do surgery to reattach the muscles. It did work in time so no surgery was required.
OK for referral or does she need appt?
[Joint Pain] : joint pain

## 2023-06-30 ENCOUNTER — OFFICE VISIT (OUTPATIENT)
Dept: CARDIOLOGY CLINIC | Age: 73
End: 2023-06-30
Payer: MEDICARE

## 2023-06-30 VITALS
DIASTOLIC BLOOD PRESSURE: 80 MMHG | WEIGHT: 153 LBS | HEIGHT: 61 IN | HEART RATE: 72 BPM | BODY MASS INDEX: 28.89 KG/M2 | SYSTOLIC BLOOD PRESSURE: 130 MMHG | OXYGEN SATURATION: 98 %

## 2023-06-30 DIAGNOSIS — E78.5 HYPERLIPIDEMIA, UNSPECIFIED HYPERLIPIDEMIA TYPE: ICD-10-CM

## 2023-06-30 DIAGNOSIS — I48.0 PAROXYSMAL ATRIAL FIBRILLATION (HCC): Primary | ICD-10-CM

## 2023-06-30 DIAGNOSIS — I10 ESSENTIAL HYPERTENSION: ICD-10-CM

## 2023-06-30 DIAGNOSIS — I42.8 NICM (NONISCHEMIC CARDIOMYOPATHY) (HCC): ICD-10-CM

## 2023-06-30 DIAGNOSIS — I25.10 CORONARY ARTERY DISEASE INVOLVING NATIVE CORONARY ARTERY OF NATIVE HEART WITHOUT ANGINA PECTORIS: ICD-10-CM

## 2023-06-30 PROCEDURE — 3079F DIAST BP 80-89 MM HG: CPT | Performed by: INTERNAL MEDICINE

## 2023-06-30 PROCEDURE — 1123F ACP DISCUSS/DSCN MKR DOCD: CPT | Performed by: INTERNAL MEDICINE

## 2023-06-30 PROCEDURE — 3075F SYST BP GE 130 - 139MM HG: CPT | Performed by: INTERNAL MEDICINE

## 2023-06-30 PROCEDURE — 99214 OFFICE O/P EST MOD 30 MIN: CPT | Performed by: INTERNAL MEDICINE

## 2023-06-30 PROCEDURE — 93000 ELECTROCARDIOGRAM COMPLETE: CPT | Performed by: INTERNAL MEDICINE

## 2023-06-30 RX ORDER — CARVEDILOL 3.12 MG/1
3.12 TABLET ORAL 2 TIMES DAILY WITH MEALS
Qty: 90 TABLET | Refills: 3 | Status: SHIPPED | OUTPATIENT
Start: 2023-06-30

## 2023-07-01 ENCOUNTER — PATIENT MESSAGE (OUTPATIENT)
Dept: CARDIOLOGY CLINIC | Age: 73
End: 2023-07-01

## 2023-07-03 NOTE — TELEPHONE ENCOUNTER
Called patient and relayed EKG review by Dr. Syeda Smyth from patient visit. Patient verbalized and confirmed understanding.

## 2023-07-10 DIAGNOSIS — G89.4 CHRONIC PAIN SYNDROME: Chronic | ICD-10-CM

## 2023-07-10 DIAGNOSIS — E78.2 MIXED HYPERLIPIDEMIA: ICD-10-CM

## 2023-07-10 DIAGNOSIS — M15.9 PRIMARY OSTEOARTHRITIS INVOLVING MULTIPLE JOINTS: Chronic | ICD-10-CM

## 2023-07-10 RX ORDER — HYDROCODONE BITARTRATE AND ACETAMINOPHEN 7.5; 325 MG/1; MG/1
1 TABLET ORAL EVERY 8 HOURS PRN
Qty: 90 TABLET | Refills: 0 | Status: SHIPPED | OUTPATIENT
Start: 2023-07-10 | End: 2023-08-09

## 2023-07-10 RX ORDER — ATORVASTATIN CALCIUM 40 MG/1
40 TABLET, FILM COATED ORAL NIGHTLY
Qty: 90 TABLET | Refills: 1 | Status: SHIPPED | OUTPATIENT
Start: 2023-07-10

## 2023-07-10 NOTE — TELEPHONE ENCOUNTER
----- Message from Kervin Oseguera. Lauren sent at 7/9/2023  3:20 AM EDT -----  Regarding: Prescription refill  Contact: [phone]  Please send in my refill of Hydrocodone-Acetaminophen 7.5. Qty. 90   and Atorvastatin 40mg Qty. 90   to my [address] [facility].    Thank you

## 2023-07-13 ENCOUNTER — OFFICE VISIT (OUTPATIENT)
Dept: FAMILY MEDICINE CLINIC | Age: 73
End: 2023-07-13
Payer: MEDICARE

## 2023-07-13 VITALS
SYSTOLIC BLOOD PRESSURE: 124 MMHG | RESPIRATION RATE: 16 BRPM | TEMPERATURE: 98.4 F | HEIGHT: 61 IN | BODY MASS INDEX: 28.47 KG/M2 | HEART RATE: 88 BPM | DIASTOLIC BLOOD PRESSURE: 72 MMHG | WEIGHT: 150.8 LBS | OXYGEN SATURATION: 95 %

## 2023-07-13 DIAGNOSIS — F11.20 OPIOID DEPENDENCE WITH CURRENT USE (HCC): ICD-10-CM

## 2023-07-13 DIAGNOSIS — I48.0 PAROXYSMAL ATRIAL FIBRILLATION (HCC): ICD-10-CM

## 2023-07-13 DIAGNOSIS — M80.00XD AGE-RELATED OSTEOPOROSIS WITH CURRENT PATHOLOGICAL FRACTURE WITH ROUTINE HEALING: ICD-10-CM

## 2023-07-13 DIAGNOSIS — M89.9 DISORDER OF BONE, UNSPECIFIED: ICD-10-CM

## 2023-07-13 DIAGNOSIS — M15.9 PRIMARY OSTEOARTHRITIS INVOLVING MULTIPLE JOINTS: Primary | Chronic | ICD-10-CM

## 2023-07-13 DIAGNOSIS — I10 ESSENTIAL HYPERTENSION: Chronic | ICD-10-CM

## 2023-07-13 DIAGNOSIS — R79.89 LOW VITAMIN D LEVEL: ICD-10-CM

## 2023-07-13 LAB
ALCOHOL URINE: NORMAL
AMPHETAMINE SCREEN, URINE: NORMAL
BARBITURATE SCREEN, URINE: NORMAL
BENZODIAZEPINE SCREEN, URINE: NORMAL
BUPRENORPHINE URINE: NORMAL
COCAINE METABOLITE SCREEN URINE: NORMAL
FENTANYL SCREEN, URINE: NORMAL
GABAPENTIN SCREEN, URINE: NORMAL
MDMA URINE: NORMAL
METHADONE SCREEN, URINE: NORMAL
METHAMPHETAMINE, URINE: NORMAL
OPIATE SCREEN URINE: POSITIVE
OXYCODONE SCREEN URINE: NORMAL
PHENCYCLIDINE SCREEN URINE: NORMAL
PROPOXYPHENE SCREEN, URINE: NORMAL
SYNTHETIC CANNABINOIDS(K2) SCREEN, URINE: NORMAL
THC SCREEN, URINE: NORMAL
TRAMADOL SCREEN URINE: NORMAL
TRICYCLIC ANTIDEPRESSANTS, UR: NORMAL

## 2023-07-13 PROCEDURE — 3078F DIAST BP <80 MM HG: CPT | Performed by: FAMILY MEDICINE

## 2023-07-13 PROCEDURE — 99214 OFFICE O/P EST MOD 30 MIN: CPT | Performed by: FAMILY MEDICINE

## 2023-07-13 PROCEDURE — 3074F SYST BP LT 130 MM HG: CPT | Performed by: FAMILY MEDICINE

## 2023-07-13 PROCEDURE — 1123F ACP DISCUSS/DSCN MKR DOCD: CPT | Performed by: FAMILY MEDICINE

## 2023-07-13 PROCEDURE — 80305 DRUG TEST PRSMV DIR OPT OBS: CPT | Performed by: FAMILY MEDICINE

## 2023-07-19 DIAGNOSIS — Z12.31 BREAST CANCER SCREENING BY MAMMOGRAM: Primary | ICD-10-CM

## 2023-08-08 DIAGNOSIS — M15.9 PRIMARY OSTEOARTHRITIS INVOLVING MULTIPLE JOINTS: Chronic | ICD-10-CM

## 2023-08-08 DIAGNOSIS — G89.4 CHRONIC PAIN SYNDROME: Chronic | ICD-10-CM

## 2023-08-08 DIAGNOSIS — E78.2 MIXED HYPERLIPIDEMIA: ICD-10-CM

## 2023-08-08 RX ORDER — HYDROCODONE BITARTRATE AND ACETAMINOPHEN 7.5; 325 MG/1; MG/1
1 TABLET ORAL EVERY 8 HOURS PRN
Qty: 90 TABLET | Refills: 0 | Status: SHIPPED | OUTPATIENT
Start: 2023-08-08 | End: 2023-09-07

## 2023-08-08 RX ORDER — ATORVASTATIN CALCIUM 40 MG/1
40 TABLET, FILM COATED ORAL NIGHTLY
Qty: 90 TABLET | Refills: 1 | Status: SHIPPED | OUTPATIENT
Start: 2023-08-08

## 2023-08-08 NOTE — TELEPHONE ENCOUNTER
----- Message from Shantell Lauren sent at 8/8/2023 10:46 AM EDT -----  Regarding: Prescription refill  Contact: [phone]  Please send in my refill of Hydrocodone-Acetaminophen 7.5. Qty. 90   and Atorvastatin 40mg Qty. 90   to my [address] [facility].    Thank you

## 2023-08-09 RX ORDER — DICLOFENAC POTASSIUM 50 MG/1
TABLET, FILM COATED ORAL
Qty: 60 TABLET | Refills: 1 | OUTPATIENT
Start: 2023-08-09

## 2023-08-22 RX ORDER — DICLOFENAC POTASSIUM 50 MG/1
TABLET, FILM COATED ORAL
Qty: 60 TABLET | Refills: 1 | OUTPATIENT
Start: 2023-08-22

## 2023-08-22 NOTE — TELEPHONE ENCOUNTER
----- Message from Gurdeep tSill. Lauren sent at 8/22/2023  2:06 PM EDT -----  Regarding: Prescription refill needed  Contact: [phone]  Please refill my prescription for diclofenac pot 50 mg tablet qty 60 to [facility] in [facility], phone [phone].   Thank you

## 2023-08-24 ENCOUNTER — TELEPHONE (OUTPATIENT)
Dept: FAMILY MEDICINE CLINIC | Age: 73
End: 2023-08-24

## 2023-08-24 RX ORDER — DICLOFENAC POTASSIUM 50 MG/1
TABLET, FILM COATED ORAL
Qty: 60 TABLET | Refills: 1 | Status: SHIPPED | OUTPATIENT
Start: 2023-08-24

## 2023-08-24 NOTE — TELEPHONE ENCOUNTER
----- Message from Maty Mcintyre. Lauren sent at 8/24/2023  5:28 AM EDT -----  Regarding: Prescription refill needed  Contact: [phone]  I have decided not to start a blood thinner so would you please refill my diclofenac. It helps me get around so much better than without it. I need it. Thank you.

## 2023-08-31 RX ORDER — CARVEDILOL 3.12 MG/1
3.12 TABLET ORAL 2 TIMES DAILY WITH MEALS
Qty: 180 TABLET | Refills: 3 | Status: SHIPPED | OUTPATIENT
Start: 2023-08-31 | End: 2023-09-29 | Stop reason: SDUPTHER

## 2023-08-31 RX ORDER — CARVEDILOL 3.12 MG/1
3.12 TABLET ORAL 2 TIMES DAILY WITH MEALS
Qty: 90 TABLET | Refills: 3 | Status: SHIPPED | OUTPATIENT
Start: 2023-08-31 | End: 2023-08-31 | Stop reason: SDUPTHER

## 2023-09-06 RX ORDER — FUROSEMIDE 40 MG/1
TABLET ORAL
Qty: 90 TABLET | Refills: 0 | Status: SHIPPED | OUTPATIENT
Start: 2023-09-06

## 2023-09-06 RX ORDER — CARVEDILOL 3.12 MG/1
TABLET ORAL
Qty: 90 TABLET | OUTPATIENT
Start: 2023-09-06

## 2023-09-06 NOTE — TELEPHONE ENCOUNTER
Last OV: 6/30/23  Next OV: X  Last refill: 6/6/23  #90  Most recent Labs: 12/15/22  Last EKG (if needed): 6/30/23

## 2023-09-11 DIAGNOSIS — G89.4 CHRONIC PAIN SYNDROME: Chronic | ICD-10-CM

## 2023-09-11 DIAGNOSIS — M15.9 PRIMARY OSTEOARTHRITIS INVOLVING MULTIPLE JOINTS: Chronic | ICD-10-CM

## 2023-09-11 RX ORDER — HYDROCODONE BITARTRATE AND ACETAMINOPHEN 7.5; 325 MG/1; MG/1
1 TABLET ORAL EVERY 8 HOURS PRN
Qty: 90 TABLET | Refills: 0 | Status: SHIPPED | OUTPATIENT
Start: 2023-09-11 | End: 2023-10-11

## 2023-09-11 NOTE — TELEPHONE ENCOUNTER
----- Message from Robel Rodriguez. Lauren sent at 9/9/2023  5:39 PM EDT -----  Regarding: Prescription refill  Contact: [phone]  Please send in my refill of Hydrocodone-Acetaminophen 7.5. Qty. 90   to my [address] [facility].    Thank you

## 2023-09-28 ENCOUNTER — PATIENT MESSAGE (OUTPATIENT)
Dept: CARDIOLOGY CLINIC | Age: 73
End: 2023-09-28

## 2023-09-29 RX ORDER — CARVEDILOL 3.12 MG/1
3.12 TABLET ORAL 2 TIMES DAILY WITH MEALS
Qty: 180 TABLET | Refills: 3 | Status: SHIPPED | OUTPATIENT
Start: 2023-09-29

## 2023-10-16 DIAGNOSIS — G89.4 CHRONIC PAIN SYNDROME: Chronic | ICD-10-CM

## 2023-10-16 DIAGNOSIS — M15.9 PRIMARY OSTEOARTHRITIS INVOLVING MULTIPLE JOINTS: Chronic | ICD-10-CM

## 2023-10-16 RX ORDER — HYDROCODONE BITARTRATE AND ACETAMINOPHEN 7.5; 325 MG/1; MG/1
1 TABLET ORAL EVERY 8 HOURS PRN
Qty: 90 TABLET | Refills: 0 | Status: SHIPPED | OUTPATIENT
Start: 2023-10-16 | End: 2023-11-15

## 2023-10-16 NOTE — TELEPHONE ENCOUNTER
----- Message from Shantell Lauren sent at 10/15/2023  8:08 AM EDT -----  Regarding: Prescription refill   Contact: [phone]    Please send in my refill of Hydrocodone-Acetaminophen 7.5. Qty. 90   to my [address] [facility].    Thank you

## 2023-10-18 ENCOUNTER — OFFICE VISIT (OUTPATIENT)
Dept: FAMILY MEDICINE CLINIC | Age: 73
End: 2023-10-18
Payer: MEDICARE

## 2023-10-18 VITALS
HEART RATE: 72 BPM | HEIGHT: 61 IN | WEIGHT: 155 LBS | BODY MASS INDEX: 29.27 KG/M2 | DIASTOLIC BLOOD PRESSURE: 80 MMHG | OXYGEN SATURATION: 97 % | RESPIRATION RATE: 16 BRPM | SYSTOLIC BLOOD PRESSURE: 126 MMHG

## 2023-10-18 DIAGNOSIS — M15.9 PRIMARY OSTEOARTHRITIS INVOLVING MULTIPLE JOINTS: Primary | Chronic | ICD-10-CM

## 2023-10-18 DIAGNOSIS — G89.29 CHRONIC BILATERAL LOW BACK PAIN WITHOUT SCIATICA: ICD-10-CM

## 2023-10-18 DIAGNOSIS — I48.0 PAROXYSMAL ATRIAL FIBRILLATION (HCC): ICD-10-CM

## 2023-10-18 DIAGNOSIS — K59.09 CHRONIC CONSTIPATION: ICD-10-CM

## 2023-10-18 DIAGNOSIS — Z12.11 COLON CANCER SCREENING: ICD-10-CM

## 2023-10-18 DIAGNOSIS — M54.50 CHRONIC BILATERAL LOW BACK PAIN WITHOUT SCIATICA: ICD-10-CM

## 2023-10-18 DIAGNOSIS — I10 ESSENTIAL HYPERTENSION: Chronic | ICD-10-CM

## 2023-10-18 PROCEDURE — 1123F ACP DISCUSS/DSCN MKR DOCD: CPT | Performed by: FAMILY MEDICINE

## 2023-10-18 PROCEDURE — 99214 OFFICE O/P EST MOD 30 MIN: CPT | Performed by: FAMILY MEDICINE

## 2023-10-18 PROCEDURE — 3079F DIAST BP 80-89 MM HG: CPT | Performed by: FAMILY MEDICINE

## 2023-10-18 PROCEDURE — 3074F SYST BP LT 130 MM HG: CPT | Performed by: FAMILY MEDICINE

## 2023-10-18 NOTE — PATIENT INSTRUCTIONS
[address] [facility] Endocrinology, Cholesterol and Diabetes - [place] [facility], [facility]  Call to schedule:  [phone]

## 2023-10-30 ENCOUNTER — PATIENT MESSAGE (OUTPATIENT)
Dept: FAMILY MEDICINE CLINIC | Age: 73
End: 2023-10-30

## 2023-10-30 NOTE — TELEPHONE ENCOUNTER
She was referred to dr Idalmis Humphrey for colonoscopy  Is there anything else GI needs to be aware of?

## 2023-10-30 NOTE — TELEPHONE ENCOUNTER
From: Romana Go  To: Dr. Aditya Mullen Day  Sent: 10/30/2023 3:23 PM EDT  Subject: Re: my appt with [facility], Dr Hannah Parrish    I was told by Steve Cooper from the [facility] that he needs more info on what all you want them to check me out for other than getting a colonoscopy before they can set an appt for me.  He said they may want to do more than one test so they need all the info first.    He gave me a fax number for you to use:  [phone] to the   Attn: of Steve Cooper

## 2023-11-13 ENCOUNTER — PATIENT MESSAGE (OUTPATIENT)
Dept: FAMILY MEDICINE CLINIC | Age: 73
End: 2023-11-13

## 2023-11-13 DIAGNOSIS — G89.4 CHRONIC PAIN SYNDROME: Chronic | ICD-10-CM

## 2023-11-13 DIAGNOSIS — M15.9 PRIMARY OSTEOARTHRITIS INVOLVING MULTIPLE JOINTS: Chronic | ICD-10-CM

## 2023-11-13 RX ORDER — HYDROCODONE BITARTRATE AND ACETAMINOPHEN 7.5; 325 MG/1; MG/1
1 TABLET ORAL EVERY 8 HOURS PRN
Qty: 90 TABLET | Refills: 0 | Status: SHIPPED | OUTPATIENT
Start: 2023-11-13 | End: 2023-12-13

## 2023-11-13 NOTE — TELEPHONE ENCOUNTER
From: Denise Dejesus  To: Dr. Byrne Au Day  Sent: 11/13/2023 12:21 PM EST  Subject: Prescription refill    Please send in my refill of Hydrocodone-Acetaminophen 7.5. Qty. 90   to my [address] [facility].    Thank you

## 2023-12-07 RX ORDER — FUROSEMIDE 40 MG/1
TABLET ORAL
Qty: 30 TABLET | Refills: 0 | Status: SHIPPED | OUTPATIENT
Start: 2023-12-07

## 2023-12-07 NOTE — TELEPHONE ENCOUNTER
Last OV: 6/30/23  Next OV:  X was due in 2 months  Last refill: 9/6/23  #90  Most recent Labs: 10/18/23  Last EKG (if needed): 6/30/23    Called patient left message to return call to set up her 2 month follow up with Dr Tya Burns.

## 2023-12-13 ENCOUNTER — PATIENT MESSAGE (OUTPATIENT)
Dept: FAMILY MEDICINE CLINIC | Age: 73
End: 2023-12-13

## 2023-12-13 DIAGNOSIS — M15.9 PRIMARY OSTEOARTHRITIS INVOLVING MULTIPLE JOINTS: Chronic | ICD-10-CM

## 2023-12-13 DIAGNOSIS — G89.4 CHRONIC PAIN SYNDROME: Chronic | ICD-10-CM

## 2023-12-13 RX ORDER — HYDROCODONE BITARTRATE AND ACETAMINOPHEN 7.5; 325 MG/1; MG/1
1 TABLET ORAL EVERY 8 HOURS PRN
Qty: 90 TABLET | Refills: 0 | Status: SHIPPED | OUTPATIENT
Start: 2023-12-13 | End: 2024-01-12

## 2023-12-13 NOTE — TELEPHONE ENCOUNTER
From: Oneita Bernheim  To: Dr. Kimbrough Fruits Day  Sent: 12/13/2023 5:07 PM EST  Subject: Prescription refill    Please send in my refill of Hydrocodone-Acetaminophen 7.5. Qty. 90   to my [address] [facility].    Thank you

## 2024-01-02 RX ORDER — FUROSEMIDE 40 MG/1
TABLET ORAL
Qty: 90 TABLET | Refills: 1 | Status: SHIPPED | OUTPATIENT
Start: 2024-01-02

## 2024-01-12 ENCOUNTER — TELEPHONE (OUTPATIENT)
Dept: FAMILY MEDICINE CLINIC | Age: 74
End: 2024-01-12

## 2024-01-12 DIAGNOSIS — G89.4 CHRONIC PAIN SYNDROME: Chronic | ICD-10-CM

## 2024-01-12 DIAGNOSIS — M15.9 PRIMARY OSTEOARTHRITIS INVOLVING MULTIPLE JOINTS: Chronic | ICD-10-CM

## 2024-01-12 RX ORDER — HYDROCODONE BITARTRATE AND ACETAMINOPHEN 7.5; 325 MG/1; MG/1
1 TABLET ORAL EVERY 8 HOURS PRN
Qty: 90 TABLET | Refills: 0 | Status: SHIPPED | OUTPATIENT
Start: 2024-01-12 | End: 2024-02-11

## 2024-01-12 NOTE — TELEPHONE ENCOUNTER
----- Message from Mckenna Lauren sent at 1/11/2024  8:02 PM EST -----  Regarding: Prescription refill  Contact: [phone]  Please send in my refill of Hydrocodone-Acetaminophen 7.5. Qty. 90   to my [facility].   Thank you

## 2024-01-29 ASSESSMENT — PATIENT HEALTH QUESTIONNAIRE - PHQ9
SUM OF ALL RESPONSES TO PHQ9 QUESTIONS 1 & 2: 0
SUM OF ALL RESPONSES TO PHQ QUESTIONS 1-9: 0
1. LITTLE INTEREST OR PLEASURE IN DOING THINGS: NOT AT ALL
1. LITTLE INTEREST OR PLEASURE IN DOING THINGS: 0
SUM OF ALL RESPONSES TO PHQ QUESTIONS 1-9: 0
2. FEELING DOWN, DEPRESSED OR HOPELESS: 0
SUM OF ALL RESPONSES TO PHQ QUESTIONS 1-9: 0
SUM OF ALL RESPONSES TO PHQ QUESTIONS 1-9: 0
2. FEELING DOWN, DEPRESSED OR HOPELESS: NOT AT ALL
SUM OF ALL RESPONSES TO PHQ9 QUESTIONS 1 & 2: 0

## 2024-02-01 ENCOUNTER — OFFICE VISIT (OUTPATIENT)
Dept: FAMILY MEDICINE CLINIC | Age: 74
End: 2024-02-01
Payer: MEDICARE

## 2024-02-01 VITALS
HEIGHT: 61 IN | TEMPERATURE: 98.2 F | HEART RATE: 66 BPM | BODY MASS INDEX: 30.17 KG/M2 | WEIGHT: 159.8 LBS | RESPIRATION RATE: 18 BRPM | SYSTOLIC BLOOD PRESSURE: 136 MMHG | OXYGEN SATURATION: 98 % | DIASTOLIC BLOOD PRESSURE: 64 MMHG

## 2024-02-01 DIAGNOSIS — I42.8 NICM (NONISCHEMIC CARDIOMYOPATHY) (HCC): ICD-10-CM

## 2024-02-01 DIAGNOSIS — M15.9 PRIMARY OSTEOARTHRITIS INVOLVING MULTIPLE JOINTS: Primary | Chronic | ICD-10-CM

## 2024-02-01 DIAGNOSIS — I48.0 PAROXYSMAL ATRIAL FIBRILLATION (HCC): ICD-10-CM

## 2024-02-01 DIAGNOSIS — I10 ESSENTIAL HYPERTENSION: Chronic | ICD-10-CM

## 2024-02-01 DIAGNOSIS — M79.672 BILATERAL FOOT PAIN: ICD-10-CM

## 2024-02-01 DIAGNOSIS — K59.09 CHRONIC CONSTIPATION: ICD-10-CM

## 2024-02-01 DIAGNOSIS — F11.20 OPIOID DEPENDENCE WITH CURRENT USE (HCC): ICD-10-CM

## 2024-02-01 DIAGNOSIS — M79.671 BILATERAL FOOT PAIN: ICD-10-CM

## 2024-02-01 PROCEDURE — 99214 OFFICE O/P EST MOD 30 MIN: CPT | Performed by: FAMILY MEDICINE

## 2024-02-01 PROCEDURE — 1123F ACP DISCUSS/DSCN MKR DOCD: CPT | Performed by: FAMILY MEDICINE

## 2024-02-01 PROCEDURE — 3078F DIAST BP <80 MM HG: CPT | Performed by: FAMILY MEDICINE

## 2024-02-01 PROCEDURE — 3075F SYST BP GE 130 - 139MM HG: CPT | Performed by: FAMILY MEDICINE

## 2024-02-01 RX ORDER — KETOCONAZOLE 20 MG/G
CREAM TOPICAL
Qty: 30 G | Refills: 1 | Status: SHIPPED | OUTPATIENT
Start: 2024-02-01

## 2024-02-01 RX ORDER — PANTOPRAZOLE SODIUM 40 MG/1
40 TABLET, DELAYED RELEASE ORAL
Qty: 90 TABLET | Refills: 1
Start: 2024-02-01

## 2024-02-01 NOTE — PROGRESS NOTES
2/1/2024    This is a 73 y.o. female     HPI  Chronic pain  -She has osteoarthritis of multiple joints.  More recently, history of femoral fracture.  Overall she reports she is doing well.  She takes hydrocodone/acetaminophen 7.5/325 up to 3 times daily as needed for pain.  She reports no side effects from medication other than constipation. The medication helps her function, improve her mobility, allows her to keep up with her grandson more easily  - her knees are impacting her mobility the most.   - history of femur and humeral fracture after fall    Has concerns for bilateral foot pain. Mostly in heels. Pain tends to come and go. Worse if she is up for longer or wearing certain shoes. Going on for a couple of months    Left thumb pain  - chronic, intermittent. Worse with holding certain things    HTN  BP Readings from Last 3 Encounters:   02/01/24 136/64   10/18/23 126/80   07/13/23 124/72       Review of Systems     Current Outpatient Medications   Medication Sig Dispense Refill    linaclotide (LINZESS) 145 MCG capsule Take 1 capsule by mouth every morning (before breakfast) 90 capsule 1    pantoprazole (PROTONIX) 40 MG tablet Take 1 tablet by mouth every morning (before breakfast) 90 tablet 1    HYDROcodone-acetaminophen (NORCO) 7.5-325 MG per tablet Take 1 tablet by mouth every 8 hours as needed for Pain for up to 30 days. Intended supply: 30 days 90 tablet 0    furosemide (LASIX) 40 MG tablet TAKE 1 TABLET BY MOUTH DAILY AS NEEDED (FOR SHORTNESS OF BREATH, WEIGHT GAIN, BLE EDEMA) 90 tablet 1    carvedilol (COREG) 3.125 MG tablet Take 1 tablet by mouth 2 times daily (with meals) 180 tablet 3    atorvastatin (LIPITOR) 40 MG tablet Take 1 tablet by mouth nightly 90 tablet 1    tiZANidine (ZANAFLEX) 4 MG tablet TAKE ONE TABLET BY MOUTH NIGHTLY PRN for muscle spasms 30 tablet 5    aspirin 81 MG EC tablet Take 1 tablet by mouth daily      diphenhydrAMINE (BENADRYL) 25 MG tablet Take 1 tablet by mouth nightly as

## 2024-02-09 ENCOUNTER — PATIENT MESSAGE (OUTPATIENT)
Dept: FAMILY MEDICINE CLINIC | Age: 74
End: 2024-02-09

## 2024-02-09 DIAGNOSIS — M15.9 PRIMARY OSTEOARTHRITIS INVOLVING MULTIPLE JOINTS: Chronic | ICD-10-CM

## 2024-02-09 DIAGNOSIS — G89.4 CHRONIC PAIN SYNDROME: Chronic | ICD-10-CM

## 2024-02-09 RX ORDER — HYDROCODONE BITARTRATE AND ACETAMINOPHEN 7.5; 325 MG/1; MG/1
1 TABLET ORAL EVERY 8 HOURS PRN
Qty: 90 TABLET | Refills: 0 | Status: SHIPPED | OUTPATIENT
Start: 2024-02-09 | End: 2024-03-10

## 2024-02-09 NOTE — TELEPHONE ENCOUNTER
From: Mckenna Lauren  To: Dr. Cholo Gurrola  Sent: 2/9/2024 2:22 PM EST  Subject: Prescription refill    Please send in my refill of Hydrocodone-Acetaminophen 7.5. Qty. 90   to my [facility].   Thank you

## 2024-03-10 ENCOUNTER — PATIENT MESSAGE (OUTPATIENT)
Dept: FAMILY MEDICINE CLINIC | Age: 74
End: 2024-03-10

## 2024-03-10 DIAGNOSIS — M15.9 PRIMARY OSTEOARTHRITIS INVOLVING MULTIPLE JOINTS: Chronic | ICD-10-CM

## 2024-03-10 DIAGNOSIS — G89.4 CHRONIC PAIN SYNDROME: Chronic | ICD-10-CM

## 2024-03-11 RX ORDER — HYDROCODONE BITARTRATE AND ACETAMINOPHEN 7.5; 325 MG/1; MG/1
1 TABLET ORAL EVERY 8 HOURS PRN
Qty: 90 TABLET | Refills: 0 | Status: SHIPPED | OUTPATIENT
Start: 2024-03-11 | End: 2024-04-10

## 2024-03-11 NOTE — TELEPHONE ENCOUNTER
From: Mckenna Lauren  To: Dr. Cholo Gurrola  Sent: 3/10/2024 4:29 AM EDT  Subject: Prescription refill    Please send in my refill of Hydrocodone-Acetaminophen 7.5. Qty. 90   to my [facility].   Thank you

## 2024-04-10 DIAGNOSIS — G89.4 CHRONIC PAIN SYNDROME: Chronic | ICD-10-CM

## 2024-04-10 DIAGNOSIS — M15.9 PRIMARY OSTEOARTHRITIS INVOLVING MULTIPLE JOINTS: Chronic | ICD-10-CM

## 2024-04-10 RX ORDER — HYDROCODONE BITARTRATE AND ACETAMINOPHEN 7.5; 325 MG/1; MG/1
1 TABLET ORAL EVERY 8 HOURS PRN
Qty: 90 TABLET | Refills: 0 | Status: SHIPPED | OUTPATIENT
Start: 2024-04-10 | End: 2024-05-10

## 2024-04-10 NOTE — TELEPHONE ENCOUNTER
----- Message from Mckenna Lauren sent at 4/10/2024  4:06 AM EDT -----  Regarding: Prescription refill  Contact: [phone]  Please send in my refill of Hydrocodone-Acetaminophen 7.5. Qty. 90   to my [facility].   Thank you

## 2024-05-02 RX ORDER — KETOCONAZOLE 20 MG/G
CREAM TOPICAL
Qty: 30 G | Refills: 1 | Status: SHIPPED | OUTPATIENT
Start: 2024-05-02

## 2024-05-03 NOTE — PROGRESS NOTES
Left pt vm to assist in scheduling overdue annual    the HPI:    PHYSICAL EXAM    GENERAL: No acute distress, alert and oriented, no hoarseness, strong voice  There is evidence of a straight nasal dorsum with no step-offs or deformities. There is no septal hematoma. The nasal septum is deviated. There is mild tenderness to palpation over the nasal dorsum. The lips appear normal, the mucous membranes are moist.  The external ears are normal.  There are no other lesions of the face or neck. PROCEDURE      This note was generated completely or in part utilizing Dragon dictation speech recognition software. Occasionally, words are mistranscribed and despite editing, the text may contain inaccuracies due to incorrect word recognition. If further clarification is needed please contact the office at [phone]. An electronic signature was used to authenticate this note.     --Kang Tate MD

## 2024-05-13 DIAGNOSIS — M15.9 PRIMARY OSTEOARTHRITIS INVOLVING MULTIPLE JOINTS: Chronic | ICD-10-CM

## 2024-05-13 DIAGNOSIS — G89.4 CHRONIC PAIN SYNDROME: Chronic | ICD-10-CM

## 2024-05-13 RX ORDER — HYDROCODONE BITARTRATE AND ACETAMINOPHEN 7.5; 325 MG/1; MG/1
1 TABLET ORAL EVERY 8 HOURS PRN
Qty: 90 TABLET | Refills: 0 | Status: SHIPPED | OUTPATIENT
Start: 2024-05-13 | End: 2024-06-12

## 2024-05-13 NOTE — TELEPHONE ENCOUNTER
----- Message from Mckenna Lauren sent at 5/12/2024 12:21 AM EDT -----  Regarding: Prescription refill  Contact: [phone]  Please send in my refill of Hydrocodone-Acetaminophen 7.5. Qty. 90   to my [facility].   Thank you

## 2024-05-14 DIAGNOSIS — I48.0 PAROXYSMAL ATRIAL FIBRILLATION (HCC): Primary | ICD-10-CM

## 2024-05-14 DIAGNOSIS — I42.8 NICM (NONISCHEMIC CARDIOMYOPATHY) (HCC): ICD-10-CM

## 2024-05-14 DIAGNOSIS — R53.83 OTHER FATIGUE: ICD-10-CM

## 2024-05-14 DIAGNOSIS — R06.00 DYSPNEA, UNSPECIFIED TYPE: ICD-10-CM

## 2024-05-14 DIAGNOSIS — R07.9 CHEST PAIN, UNSPECIFIED TYPE: ICD-10-CM

## 2024-05-22 ENCOUNTER — TELEMEDICINE (OUTPATIENT)
Dept: FAMILY MEDICINE CLINIC | Age: 74
End: 2024-05-22
Payer: MEDICARE

## 2024-05-22 DIAGNOSIS — T40.2X5A THERAPEUTIC OPIOID-INDUCED CONSTIPATION (OIC): ICD-10-CM

## 2024-05-22 DIAGNOSIS — G89.29 CHRONIC BILATERAL LOW BACK PAIN WITHOUT SCIATICA: ICD-10-CM

## 2024-05-22 DIAGNOSIS — M54.50 CHRONIC BILATERAL LOW BACK PAIN WITHOUT SCIATICA: ICD-10-CM

## 2024-05-22 DIAGNOSIS — R73.01 ELEVATED FASTING GLUCOSE: ICD-10-CM

## 2024-05-22 DIAGNOSIS — M15.9 PRIMARY OSTEOARTHRITIS INVOLVING MULTIPLE JOINTS: Primary | Chronic | ICD-10-CM

## 2024-05-22 DIAGNOSIS — K59.03 THERAPEUTIC OPIOID-INDUCED CONSTIPATION (OIC): ICD-10-CM

## 2024-05-22 PROCEDURE — 1123F ACP DISCUSS/DSCN MKR DOCD: CPT | Performed by: FAMILY MEDICINE

## 2024-05-22 PROCEDURE — 99213 OFFICE O/P EST LOW 20 MIN: CPT | Performed by: FAMILY MEDICINE

## 2024-05-22 SDOH — ECONOMIC STABILITY: TRANSPORTATION INSECURITY
IN THE PAST 12 MONTHS, HAS LACK OF TRANSPORTATION KEPT YOU FROM MEETINGS, WORK, OR FROM GETTING THINGS NEEDED FOR DAILY LIVING?: NO

## 2024-05-22 SDOH — ECONOMIC STABILITY: FOOD INSECURITY: WITHIN THE PAST 12 MONTHS, THE FOOD YOU BOUGHT JUST DIDN'T LAST AND YOU DIDN'T HAVE MONEY TO GET MORE.: NEVER TRUE

## 2024-05-22 SDOH — ECONOMIC STABILITY: FOOD INSECURITY: WITHIN THE PAST 12 MONTHS, YOU WORRIED THAT YOUR FOOD WOULD RUN OUT BEFORE YOU GOT MONEY TO BUY MORE.: NEVER TRUE

## 2024-05-22 SDOH — ECONOMIC STABILITY: INCOME INSECURITY: HOW HARD IS IT FOR YOU TO PAY FOR THE VERY BASICS LIKE FOOD, HOUSING, MEDICAL CARE, AND HEATING?: NOT HARD AT ALL

## 2024-05-22 ASSESSMENT — PATIENT HEALTH QUESTIONNAIRE - PHQ9
SUM OF ALL RESPONSES TO PHQ QUESTIONS 1-9: 0
SUM OF ALL RESPONSES TO PHQ9 QUESTIONS 1 & 2: 0
1. LITTLE INTEREST OR PLEASURE IN DOING THINGS: NOT AT ALL
2. FEELING DOWN, DEPRESSED OR HOPELESS: NOT AT ALL

## 2024-05-22 NOTE — PROGRESS NOTES
indicated above. If you are not or unsure, please re-schedule the visit: Yes, I confirm.      Total time spent for this encounter: Not billed by time    --Cholo Gurrola MD on 5/22/2024 at 5:20 PM    An electronic signature was used to authenticate this note.

## 2024-06-11 DIAGNOSIS — G89.4 CHRONIC PAIN SYNDROME: Chronic | ICD-10-CM

## 2024-06-11 DIAGNOSIS — M15.9 PRIMARY OSTEOARTHRITIS INVOLVING MULTIPLE JOINTS: Chronic | ICD-10-CM

## 2024-06-11 RX ORDER — HYDROCODONE BITARTRATE AND ACETAMINOPHEN 7.5; 325 MG/1; MG/1
1 TABLET ORAL EVERY 8 HOURS PRN
Qty: 90 TABLET | Refills: 0 | Status: SHIPPED | OUTPATIENT
Start: 2024-06-11 | End: 2024-07-11

## 2024-06-11 NOTE — TELEPHONE ENCOUNTER
----- Message from Mckenna Lauren sent at 6/11/2024  8:27 AM EDT -----  Regarding: Refill prescription  Contact: [phone]  Please send in my refill of Hydrocodone-Acetaminophen 7.5. Qty. 90   to my [facility].   Thank you

## 2024-07-01 RX ORDER — FUROSEMIDE 40 MG/1
TABLET ORAL
Qty: 90 TABLET | Refills: 1 | Status: SHIPPED | OUTPATIENT
Start: 2024-07-01

## 2024-07-06 DIAGNOSIS — E78.2 MIXED HYPERLIPIDEMIA: ICD-10-CM

## 2024-07-08 RX ORDER — ATORVASTATIN CALCIUM 40 MG/1
40 TABLET, FILM COATED ORAL NIGHTLY
Qty: 90 TABLET | Refills: 1 | Status: SHIPPED | OUTPATIENT
Start: 2024-07-08

## 2024-07-10 ENCOUNTER — TELEPHONE (OUTPATIENT)
Dept: FAMILY MEDICINE CLINIC | Age: 74
End: 2024-07-10

## 2024-07-10 DIAGNOSIS — M15.9 PRIMARY OSTEOARTHRITIS INVOLVING MULTIPLE JOINTS: Chronic | ICD-10-CM

## 2024-07-10 DIAGNOSIS — G89.4 CHRONIC PAIN SYNDROME: Chronic | ICD-10-CM

## 2024-07-10 RX ORDER — HYDROCODONE BITARTRATE AND ACETAMINOPHEN 7.5; 325 MG/1; MG/1
1 TABLET ORAL EVERY 8 HOURS PRN
Qty: 90 TABLET | Refills: 0 | Status: SHIPPED | OUTPATIENT
Start: 2024-07-10 | End: 2024-08-09

## 2024-07-10 NOTE — TELEPHONE ENCOUNTER
----- Message from Mckenna Lauren sent at 7/10/2024  2:10 PM EDT -----  Regarding: Prescription refill  Contact: [phone]  Please send in my refill of Hydrocodone-Acetaminophen 7.5. Qty. 90   to my [facility].   Thank you

## 2024-07-10 NOTE — TELEPHONE ENCOUNTER
----- Message from Mckenna Lauren sent at 7/10/2024  2:10 PM EDT -----  Regarding: Prescription refill  Contact: [phone]  Please send in my refill of Hydrocodone-Acetaminophen 7.5. Qty. 90   to my [facility].   Thank you

## 2024-07-18 RX ORDER — TIZANIDINE 4 MG/1
TABLET ORAL
Qty: 30 TABLET | Refills: 5 | Status: SHIPPED | OUTPATIENT
Start: 2024-07-18

## 2024-08-11 ENCOUNTER — PATIENT MESSAGE (OUTPATIENT)
Dept: FAMILY MEDICINE CLINIC | Age: 74
End: 2024-08-11

## 2024-08-11 DIAGNOSIS — M15.9 PRIMARY OSTEOARTHRITIS INVOLVING MULTIPLE JOINTS: Chronic | ICD-10-CM

## 2024-08-11 DIAGNOSIS — G89.4 CHRONIC PAIN SYNDROME: Chronic | ICD-10-CM

## 2024-08-12 RX ORDER — HYDROCODONE BITARTRATE AND ACETAMINOPHEN 7.5; 325 MG/1; MG/1
1 TABLET ORAL EVERY 8 HOURS PRN
Qty: 90 TABLET | Refills: 0 | Status: SHIPPED | OUTPATIENT
Start: 2024-08-12 | End: 2024-09-11

## 2024-08-29 ENCOUNTER — HOSPITAL ENCOUNTER (OUTPATIENT)
Age: 74
Discharge: HOME | End: 2024-08-31
Attending: INTERNAL MEDICINE
Payer: MEDICARE

## 2024-08-29 VITALS
BODY MASS INDEX: 30.02 KG/M2 | SYSTOLIC BLOOD PRESSURE: 136 MMHG | HEIGHT: 61 IN | DIASTOLIC BLOOD PRESSURE: 64 MMHG | WEIGHT: 159 LBS

## 2024-08-29 DIAGNOSIS — I48.0 PAROXYSMAL ATRIAL FIBRILLATION (HCC): ICD-10-CM

## 2024-08-29 LAB
ECHO AO ASC DIAM: 3.3 CM
ECHO AO ASCENDING AORTA INDEX: 1.93 CM/M2
ECHO AO ROOT DIAM: 2.8 CM
ECHO AO ROOT INDEX: 1.64 CM/M2
ECHO AV AREA PEAK VELOCITY: 2.3 CM2
ECHO AV AREA VTI: 1.7 CM2
ECHO AV AREA/BSA PEAK VELOCITY: 1.3 CM2/M2
ECHO AV AREA/BSA VTI: 1 CM2/M2
ECHO AV MEAN GRADIENT: 2 MMHG
ECHO AV MEAN VELOCITY: 0.7 M/S
ECHO AV PEAK GRADIENT: 3 MMHG
ECHO AV PEAK VELOCITY: 0.9 M/S
ECHO AV VELOCITY RATIO: 0.78
ECHO AV VTI: 19.1 CM
ECHO BSA: 1.76 M2
ECHO LA AREA 2C: 10.4 CM2
ECHO LA AREA 4C: 14.8 CM2
ECHO LA MAJOR AXIS: 4.5 CM
ECHO LA MINOR AXIS: 4.1 CM
ECHO LA VOL BP: 30 ML (ref 22–52)
ECHO LA VOL MOD A2C: 22 ML (ref 22–52)
ECHO LA VOL MOD A4C: 39 ML (ref 22–52)
ECHO LA VOL/BSA BIPLANE: 18 ML/M2 (ref 16–34)
ECHO LA VOLUME INDEX MOD A2C: 13 ML/M2 (ref 16–34)
ECHO LA VOLUME INDEX MOD A4C: 23 ML/M2 (ref 16–34)
ECHO LV E' LATERAL VELOCITY: 6 CM/S
ECHO LV E' SEPTAL VELOCITY: 5 CM/S
ECHO LV EF PHYSICIAN: 55 %
ECHO LV FRACTIONAL SHORTENING: 22 % (ref 28–44)
ECHO LV INTERNAL DIMENSION DIASTOLE INDEX: 2.16 CM/M2
ECHO LV INTERNAL DIMENSION DIASTOLIC: 3.7 CM (ref 3.9–5.3)
ECHO LV INTERNAL DIMENSION SYSTOLIC INDEX: 1.7 CM/M2
ECHO LV INTERNAL DIMENSION SYSTOLIC: 2.9 CM
ECHO LV IVSD: 1.2 CM (ref 0.6–0.9)
ECHO LV MASS 2D: 138.2 G (ref 67–162)
ECHO LV MASS INDEX 2D: 80.8 G/M2 (ref 43–95)
ECHO LV POSTERIOR WALL DIASTOLIC: 1.1 CM (ref 0.6–0.9)
ECHO LV RELATIVE WALL THICKNESS RATIO: 0.59
ECHO LVOT AREA: 3.1 CM2
ECHO LVOT AV VTI INDEX: 0.53
ECHO LVOT DIAM: 2 CM
ECHO LVOT MEAN GRADIENT: 1 MMHG
ECHO LVOT PEAK GRADIENT: 2 MMHG
ECHO LVOT PEAK VELOCITY: 0.7 M/S
ECHO LVOT STROKE VOLUME INDEX: 18.7 ML/M2
ECHO LVOT SV: 32 ML
ECHO LVOT VTI: 10.2 CM
ECHO MV A VELOCITY: 0.84 M/S
ECHO MV AREA VTI: 2.2 CM2
ECHO MV E DECELERATION TIME (DT): 227 MS
ECHO MV E VELOCITY: 0.42 M/S
ECHO MV E/A RATIO: 0.5
ECHO MV E/E' LATERAL: 7
ECHO MV E/E' RATIO (AVERAGED): 7.7
ECHO MV E/E' SEPTAL: 8.4
ECHO MV LVOT VTI INDEX: 1.41
ECHO MV MAX VELOCITY: 1.2 M/S
ECHO MV MEAN GRADIENT: 2 MMHG
ECHO MV MEAN VELOCITY: 0.6 M/S
ECHO MV PEAK GRADIENT: 5 MMHG
ECHO MV VTI: 14.4 CM
ECHO PV MAX VELOCITY: 0.7 M/S
ECHO PV PEAK GRADIENT: 2 MMHG
ECHO RA AREA 4C: 9.1 CM2
ECHO RA END SYSTOLIC VOLUME APICAL 4 CHAMBER INDEX BSA: 11 ML/M2
ECHO RA VOLUME: 19 ML
ECHO RV BASAL DIMENSION: 3 CM
ECHO RV FREE WALL PEAK S': 11 CM/S
ECHO RV MID DIMENSION: 2.4 CM
ECHO RV TAPSE: 1.8 CM (ref 1.7–?)
ECHO TV REGURGITANT MAX VELOCITY: 2.09 M/S
ECHO TV REGURGITANT PEAK GRADIENT: 17 MMHG

## 2024-08-29 PROCEDURE — 93306 TTE W/DOPPLER COMPLETE: CPT

## 2024-09-03 DIAGNOSIS — R53.83 OTHER FATIGUE: ICD-10-CM

## 2024-09-03 DIAGNOSIS — I48.0 PAROXYSMAL ATRIAL FIBRILLATION (HCC): ICD-10-CM

## 2024-09-03 DIAGNOSIS — R07.9 CHEST PAIN, UNSPECIFIED TYPE: ICD-10-CM

## 2024-09-03 DIAGNOSIS — I42.8 NICM (NONISCHEMIC CARDIOMYOPATHY) (HCC): ICD-10-CM

## 2024-09-03 DIAGNOSIS — R73.01 ELEVATED FASTING GLUCOSE: ICD-10-CM

## 2024-09-03 DIAGNOSIS — R06.00 DYSPNEA, UNSPECIFIED TYPE: ICD-10-CM

## 2024-09-04 LAB
ALBUMIN SERPL-MCNC: 4.3 G/DL (ref 3.4–5)
ALBUMIN/GLOB SERPL: 1.6 {RATIO} (ref 1.1–2.2)
ALP SERPL-CCNC: 166 U/L (ref 40–129)
ALT SERPL-CCNC: 12 U/L (ref 10–40)
ANION GAP SERPL CALCULATED.3IONS-SCNC: 14 MMOL/L (ref 3–16)
AST SERPL-CCNC: 25 U/L (ref 15–37)
BILIRUB SERPL-MCNC: 0.4 MG/DL (ref 0–1)
BUN SERPL-MCNC: 11 MG/DL (ref 7–20)
CALCIUM SERPL-MCNC: 9.7 MG/DL (ref 8.3–10.6)
CHLORIDE SERPL-SCNC: 105 MMOL/L (ref 99–110)
CHOLEST SERPL-MCNC: 165 MG/DL (ref 0–199)
CO2 SERPL-SCNC: 24 MMOL/L (ref 21–32)
CREAT SERPL-MCNC: 0.8 MG/DL (ref 0.6–1.2)
DEPRECATED RDW RBC AUTO: 13.5 % (ref 12.4–15.4)
EST. AVERAGE GLUCOSE BLD GHB EST-MCNC: 116.9 MG/DL
GFR SERPLBLD CREATININE-BSD FMLA CKD-EPI: 78 ML/MIN/{1.73_M2}
GLUCOSE SERPL-MCNC: 97 MG/DL (ref 70–99)
HBA1C MFR BLD: 5.7 %
HCT VFR BLD AUTO: 43.2 % (ref 36–48)
HDLC SERPL-MCNC: 52 MG/DL (ref 40–60)
HGB BLD-MCNC: 14.2 G/DL (ref 12–16)
LDL CHOLESTEROL: 95 MG/DL
MCH RBC QN AUTO: 29 PG (ref 26–34)
MCHC RBC AUTO-ENTMCNC: 33 G/DL (ref 31–36)
MCV RBC AUTO: 87.9 FL (ref 80–100)
PLATELET # BLD AUTO: 322 K/UL (ref 135–450)
PMV BLD AUTO: 7.6 FL (ref 5–10.5)
POTASSIUM SERPL-SCNC: 4.2 MMOL/L (ref 3.5–5.1)
PROT SERPL-MCNC: 7 G/DL (ref 6.4–8.2)
RBC # BLD AUTO: 4.91 M/UL (ref 4–5.2)
SODIUM SERPL-SCNC: 143 MMOL/L (ref 136–145)
TRIGL SERPL-MCNC: 88 MG/DL (ref 0–150)
TSH SERPL DL<=0.005 MIU/L-ACNC: 1.77 UIU/ML (ref 0.27–4.2)
VLDLC SERPL CALC-MCNC: 18 MG/DL
WBC # BLD AUTO: 13.4 K/UL (ref 4–11)

## 2024-09-05 ENCOUNTER — TELEPHONE (OUTPATIENT)
Dept: CARDIOLOGY CLINIC | Age: 74
End: 2024-09-05

## 2024-09-10 DIAGNOSIS — M15.9 PRIMARY OSTEOARTHRITIS INVOLVING MULTIPLE JOINTS: Chronic | ICD-10-CM

## 2024-09-10 DIAGNOSIS — G89.4 CHRONIC PAIN SYNDROME: Chronic | ICD-10-CM

## 2024-09-11 RX ORDER — HYDROCODONE BITARTRATE AND ACETAMINOPHEN 7.5; 325 MG/1; MG/1
1 TABLET ORAL EVERY 8 HOURS PRN
Qty: 90 TABLET | Refills: 0 | Status: SHIPPED | OUTPATIENT
Start: 2024-09-11 | End: 2024-10-11

## 2024-09-19 ENCOUNTER — OFFICE VISIT (OUTPATIENT)
Dept: FAMILY MEDICINE CLINIC | Age: 74
End: 2024-09-19
Payer: MEDICARE

## 2024-09-19 VITALS
OXYGEN SATURATION: 99 % | DIASTOLIC BLOOD PRESSURE: 88 MMHG | SYSTOLIC BLOOD PRESSURE: 132 MMHG | HEIGHT: 61 IN | RESPIRATION RATE: 16 BRPM | HEART RATE: 76 BPM | BODY MASS INDEX: 29.83 KG/M2 | WEIGHT: 158 LBS

## 2024-09-19 DIAGNOSIS — I25.10 CORONARY ARTERY DISEASE INVOLVING NATIVE CORONARY ARTERY OF NATIVE HEART WITHOUT ANGINA PECTORIS: ICD-10-CM

## 2024-09-19 DIAGNOSIS — R73.03 PREDIABETES: ICD-10-CM

## 2024-09-19 DIAGNOSIS — G89.29 CHRONIC BILATERAL LOW BACK PAIN WITHOUT SCIATICA: ICD-10-CM

## 2024-09-19 DIAGNOSIS — I10 ESSENTIAL HYPERTENSION: Chronic | ICD-10-CM

## 2024-09-19 DIAGNOSIS — G89.4 CHRONIC PAIN SYNDROME: Primary | Chronic | ICD-10-CM

## 2024-09-19 DIAGNOSIS — M54.50 CHRONIC BILATERAL LOW BACK PAIN WITHOUT SCIATICA: ICD-10-CM

## 2024-09-19 DIAGNOSIS — T40.2X5A THERAPEUTIC OPIOID-INDUCED CONSTIPATION (OIC): ICD-10-CM

## 2024-09-19 DIAGNOSIS — K59.03 THERAPEUTIC OPIOID-INDUCED CONSTIPATION (OIC): ICD-10-CM

## 2024-09-19 DIAGNOSIS — D72.829 LEUKOCYTOSIS, UNSPECIFIED TYPE: ICD-10-CM

## 2024-09-19 PROBLEM — K59.09 CHRONIC CONSTIPATION: Status: RESOLVED | Noted: 2024-02-01 | Resolved: 2024-09-19

## 2024-09-19 LAB
ALCOHOL URINE: NORMAL
AMPHETAMINE SCREEN URINE: NORMAL
BARBITURATE SCREEN URINE: NORMAL
BENZODIAZEPINE SCREEN, URINE: NORMAL
BUPRENORPHINE URINE: NORMAL
COCAINE METABOLITE SCREEN URINE: NORMAL
FENTANYL SCREEN, URINE: NORMAL
GABAPENTIN SCREEN, URINE: NORMAL
MDMA, URINE: NORMAL
METHADONE SCREEN, URINE: NORMAL
METHAMPHETAMINE, URINE: NORMAL
OPIATE SCREEN URINE: NORMAL
OXYCODONE SCREEN URINE: NORMAL
PHENCYCLIDINE SCREEN URINE: NORMAL
PROPOXYPHENE SCREEN, URINE: NORMAL
SYNTHETIC CANNABINOIDS(K2) SCREEN, URINE: NORMAL
THC SCREEN, URINE: NORMAL
TRAMADOL SCREEN URINE: NORMAL
TRICYCLIC ANTIDEPRESSANTS, UR: NORMAL

## 2024-09-19 PROCEDURE — 1123F ACP DISCUSS/DSCN MKR DOCD: CPT | Performed by: FAMILY MEDICINE

## 2024-09-19 PROCEDURE — 80305 DRUG TEST PRSMV DIR OPT OBS: CPT | Performed by: FAMILY MEDICINE

## 2024-09-19 PROCEDURE — 3079F DIAST BP 80-89 MM HG: CPT | Performed by: FAMILY MEDICINE

## 2024-09-19 PROCEDURE — 3075F SYST BP GE 130 - 139MM HG: CPT | Performed by: FAMILY MEDICINE

## 2024-09-19 PROCEDURE — 99214 OFFICE O/P EST MOD 30 MIN: CPT | Performed by: FAMILY MEDICINE

## 2024-09-23 ENCOUNTER — OFFICE VISIT (OUTPATIENT)
Dept: CARDIOLOGY CLINIC | Age: 74
End: 2024-09-23
Payer: MEDICARE

## 2024-09-23 VITALS
WEIGHT: 156 LBS | SYSTOLIC BLOOD PRESSURE: 124 MMHG | HEART RATE: 70 BPM | BODY MASS INDEX: 29.45 KG/M2 | OXYGEN SATURATION: 98 % | HEIGHT: 61 IN | DIASTOLIC BLOOD PRESSURE: 68 MMHG

## 2024-09-23 DIAGNOSIS — I42.8 NICM (NONISCHEMIC CARDIOMYOPATHY) (HCC): ICD-10-CM

## 2024-09-23 DIAGNOSIS — I10 ESSENTIAL HYPERTENSION: ICD-10-CM

## 2024-09-23 DIAGNOSIS — I48.0 PAROXYSMAL ATRIAL FIBRILLATION (HCC): Primary | ICD-10-CM

## 2024-09-23 DIAGNOSIS — I25.10 CORONARY ARTERY DISEASE INVOLVING NATIVE CORONARY ARTERY OF NATIVE HEART WITHOUT ANGINA PECTORIS: ICD-10-CM

## 2024-09-23 DIAGNOSIS — E78.5 HYPERLIPIDEMIA, UNSPECIFIED HYPERLIPIDEMIA TYPE: ICD-10-CM

## 2024-09-23 PROCEDURE — 93000 ELECTROCARDIOGRAM COMPLETE: CPT | Performed by: INTERNAL MEDICINE

## 2024-09-23 PROCEDURE — 99214 OFFICE O/P EST MOD 30 MIN: CPT | Performed by: INTERNAL MEDICINE

## 2024-09-23 PROCEDURE — 3074F SYST BP LT 130 MM HG: CPT | Performed by: INTERNAL MEDICINE

## 2024-09-23 PROCEDURE — 1123F ACP DISCUSS/DSCN MKR DOCD: CPT | Performed by: INTERNAL MEDICINE

## 2024-09-23 PROCEDURE — 3078F DIAST BP <80 MM HG: CPT | Performed by: INTERNAL MEDICINE

## 2024-09-26 RX ORDER — CARVEDILOL 3.12 MG/1
TABLET ORAL
Qty: 180 TABLET | Refills: 3 | Status: SHIPPED | OUTPATIENT
Start: 2024-09-26

## 2024-10-10 RX ORDER — KETOCONAZOLE 20 MG/G
CREAM TOPICAL
Qty: 30 G | Refills: 1 | Status: SHIPPED | OUTPATIENT
Start: 2024-10-10

## 2024-10-11 DIAGNOSIS — M15.0 PRIMARY OSTEOARTHRITIS INVOLVING MULTIPLE JOINTS: Chronic | ICD-10-CM

## 2024-10-11 DIAGNOSIS — G89.4 CHRONIC PAIN SYNDROME: Chronic | ICD-10-CM

## 2024-10-11 RX ORDER — HYDROCODONE BITARTRATE AND ACETAMINOPHEN 7.5; 325 MG/1; MG/1
1 TABLET ORAL EVERY 8 HOURS PRN
Qty: 90 TABLET | Refills: 0 | Status: SHIPPED | OUTPATIENT
Start: 2024-10-11 | End: 2024-11-10

## 2024-11-10 DIAGNOSIS — M15.0 PRIMARY OSTEOARTHRITIS INVOLVING MULTIPLE JOINTS: Chronic | ICD-10-CM

## 2024-11-10 DIAGNOSIS — G89.4 CHRONIC PAIN SYNDROME: Chronic | ICD-10-CM

## 2024-11-11 RX ORDER — HYDROCODONE BITARTRATE AND ACETAMINOPHEN 7.5; 325 MG/1; MG/1
1 TABLET ORAL EVERY 8 HOURS PRN
Qty: 90 TABLET | Refills: 0 | Status: SHIPPED | OUTPATIENT
Start: 2024-11-11 | End: 2024-12-11

## 2024-12-10 DIAGNOSIS — M15.0 PRIMARY OSTEOARTHRITIS INVOLVING MULTIPLE JOINTS: Chronic | ICD-10-CM

## 2024-12-10 DIAGNOSIS — G89.4 CHRONIC PAIN SYNDROME: Chronic | ICD-10-CM

## 2024-12-11 RX ORDER — HYDROCODONE BITARTRATE AND ACETAMINOPHEN 7.5; 325 MG/1; MG/1
1 TABLET ORAL EVERY 8 HOURS PRN
Qty: 90 TABLET | Refills: 0 | Status: SHIPPED | OUTPATIENT
Start: 2024-12-11 | End: 2025-01-10

## 2024-12-11 NOTE — TELEPHONE ENCOUNTER
Rx approved, please have pt schedule an appointment for pain management. (Requires appt every 3 months) Thanks

## 2024-12-31 DIAGNOSIS — E78.2 MIXED HYPERLIPIDEMIA: ICD-10-CM

## 2024-12-31 RX ORDER — ATORVASTATIN CALCIUM 40 MG/1
40 TABLET, FILM COATED ORAL NIGHTLY
Qty: 90 TABLET | Refills: 1 | Status: SHIPPED | OUTPATIENT
Start: 2024-12-31

## 2025-01-02 RX ORDER — FUROSEMIDE 40 MG/1
TABLET ORAL
Qty: 90 TABLET | Refills: 1 | Status: SHIPPED | OUTPATIENT
Start: 2025-01-02

## 2025-01-08 DIAGNOSIS — G89.4 CHRONIC PAIN SYNDROME: Chronic | ICD-10-CM

## 2025-01-08 DIAGNOSIS — M15.0 PRIMARY OSTEOARTHRITIS INVOLVING MULTIPLE JOINTS: Chronic | ICD-10-CM

## 2025-01-08 RX ORDER — HYDROCODONE BITARTRATE AND ACETAMINOPHEN 7.5; 325 MG/1; MG/1
1 TABLET ORAL EVERY 8 HOURS PRN
Qty: 90 TABLET | Refills: 0 | Status: SHIPPED | OUTPATIENT
Start: 2025-01-08 | End: 2025-02-07

## 2025-01-17 ENCOUNTER — TELEMEDICINE (OUTPATIENT)
Dept: FAMILY MEDICINE CLINIC | Age: 75
End: 2025-01-17

## 2025-01-17 DIAGNOSIS — G89.29 CHRONIC BILATERAL LOW BACK PAIN WITHOUT SCIATICA: ICD-10-CM

## 2025-01-17 DIAGNOSIS — D72.829 LEUKOCYTOSIS, UNSPECIFIED TYPE: ICD-10-CM

## 2025-01-17 DIAGNOSIS — F11.20 OPIOID DEPENDENCE WITH CURRENT USE (HCC): ICD-10-CM

## 2025-01-17 DIAGNOSIS — M54.50 CHRONIC BILATERAL LOW BACK PAIN WITHOUT SCIATICA: ICD-10-CM

## 2025-01-17 DIAGNOSIS — G89.4 CHRONIC PAIN SYNDROME: Primary | Chronic | ICD-10-CM

## 2025-01-17 SDOH — ECONOMIC STABILITY: FOOD INSECURITY: WITHIN THE PAST 12 MONTHS, THE FOOD YOU BOUGHT JUST DIDN'T LAST AND YOU DIDN'T HAVE MONEY TO GET MORE.: NEVER TRUE

## 2025-01-17 SDOH — ECONOMIC STABILITY: INCOME INSECURITY: IN THE LAST 12 MONTHS, WAS THERE A TIME WHEN YOU WERE NOT ABLE TO PAY THE MORTGAGE OR RENT ON TIME?: NO

## 2025-01-17 SDOH — ECONOMIC STABILITY: TRANSPORTATION INSECURITY
IN THE PAST 12 MONTHS, HAS THE LACK OF TRANSPORTATION KEPT YOU FROM MEDICAL APPOINTMENTS OR FROM GETTING MEDICATIONS?: NO

## 2025-01-17 SDOH — ECONOMIC STABILITY: FOOD INSECURITY: WITHIN THE PAST 12 MONTHS, YOU WORRIED THAT YOUR FOOD WOULD RUN OUT BEFORE YOU GOT MONEY TO BUY MORE.: NEVER TRUE

## 2025-01-17 NOTE — PROGRESS NOTES
1/17/2025    This is a 74 y.o. female     HPI  Chronic pain  -She has osteoarthritis of multiple joints. Prior hx of femoral fracture. Overall she reports she is doing well.  She takes hydrocodone/acetaminophen 7.5/325 up to 3 times daily as needed for pain.  She reports no side effects from medication other than constipation which is manageable. The medication helps her function, improve her mobility, allows her to keep up with her grandson more easily  - her knees are impacting her mobility the most.   - prior history of femur and humeral fracture after fall  - would like to restart diclofenac to take PRN which helps her pain    Opiod induced constipation  - stopped Linzess due to diarrhea  - she is going to f/u with GI and is using stool softeners in the meantime    HTN  BP Readings from Last 3 Encounters:   09/23/24 124/68   09/19/24 132/88   08/29/24 136/64   No significant dizziness or light-headedness    CAD  - no CP or SOB  - sees Dr. Pino  - on statin and baby aspirin    Elevated WBC noted on prior labs incidentally by Cardiology    Review of Systems     Current Outpatient Medications   Medication Sig Dispense Refill    diclofenac (VOLTAREN) 50 MG EC tablet Take 1 tablet by mouth 2 times daily as needed for Pain 60 tablet 3    HYDROcodone-acetaminophen (NORCO) 7.5-325 MG per tablet Take 1 tablet by mouth every 8 hours as needed for Pain for up to 30 days. Intended supply: 30 days 90 tablet 0    furosemide (LASIX) 40 MG tablet TAKE 1 TABLET BY MOUTH DAILY AS NEEDED FOR SHORTNESS OF BREATH, WEIGHT GAIN, BLE EDEMA 90 tablet 1    atorvastatin (LIPITOR) 40 MG tablet TAKE 1 TABLET BY MOUTH EVERY NIGHT AT BEDTIME 90 tablet 1    linaclotide (LINZESS) 145 MCG capsule Take 1 capsule by mouth every morning (before breakfast) 90 capsule 1    ketoconazole (NIZORAL) 2 % cream APPLY TO THE AFFECTED AREA(S) DAILY 30 g 1    carvedilol (COREG) 3.125 MG tablet TAKE 1 TABLET BY MOUTH TWICE A DAY WITH A MEAL 180 tablet 3    
Constitutional: No fever or chills  Eyes: No visual changes  HEENT: No throat pain  CV: +chest pain  Resp: No SOB +cough  GI: No abd pain, nausea or vomiting  : No dysuria  MSK: No musculoskeletal pain  Skin: No rash  Neuro: No headache

## 2025-02-10 ENCOUNTER — PATIENT MESSAGE (OUTPATIENT)
Dept: FAMILY MEDICINE CLINIC | Age: 75
End: 2025-02-10

## 2025-02-10 DIAGNOSIS — G89.4 CHRONIC PAIN SYNDROME: Chronic | ICD-10-CM

## 2025-02-10 DIAGNOSIS — M15.0 PRIMARY OSTEOARTHRITIS INVOLVING MULTIPLE JOINTS: Chronic | ICD-10-CM

## 2025-02-10 RX ORDER — HYDROCODONE BITARTRATE AND ACETAMINOPHEN 7.5; 325 MG/1; MG/1
1 TABLET ORAL EVERY 8 HOURS PRN
Qty: 90 TABLET | Refills: 0 | Status: SHIPPED | OUTPATIENT
Start: 2025-02-10 | End: 2025-03-12

## 2025-03-10 DIAGNOSIS — M15.0 PRIMARY OSTEOARTHRITIS INVOLVING MULTIPLE JOINTS: Chronic | ICD-10-CM

## 2025-03-10 DIAGNOSIS — G89.4 CHRONIC PAIN SYNDROME: Chronic | ICD-10-CM

## 2025-03-11 RX ORDER — HYDROCODONE BITARTRATE AND ACETAMINOPHEN 7.5; 325 MG/1; MG/1
1 TABLET ORAL EVERY 8 HOURS PRN
Qty: 90 TABLET | Refills: 0 | Status: SHIPPED | OUTPATIENT
Start: 2025-03-11 | End: 2025-04-10

## 2025-04-08 DIAGNOSIS — G89.4 CHRONIC PAIN SYNDROME: Chronic | ICD-10-CM

## 2025-04-08 DIAGNOSIS — M15.0 PRIMARY OSTEOARTHRITIS INVOLVING MULTIPLE JOINTS: Chronic | ICD-10-CM

## 2025-04-08 RX ORDER — HYDROCODONE BITARTRATE AND ACETAMINOPHEN 7.5; 325 MG/1; MG/1
1 TABLET ORAL EVERY 8 HOURS PRN
Qty: 90 TABLET | Refills: 0 | Status: SHIPPED | OUTPATIENT
Start: 2025-04-08 | End: 2025-05-08

## 2025-05-08 DIAGNOSIS — G89.4 CHRONIC PAIN SYNDROME: Chronic | ICD-10-CM

## 2025-05-08 DIAGNOSIS — M15.0 PRIMARY OSTEOARTHRITIS INVOLVING MULTIPLE JOINTS: Chronic | ICD-10-CM

## 2025-05-08 RX ORDER — HYDROCODONE BITARTRATE AND ACETAMINOPHEN 7.5; 325 MG/1; MG/1
1 TABLET ORAL EVERY 8 HOURS PRN
Qty: 90 TABLET | Refills: 0 | Status: SHIPPED | OUTPATIENT
Start: 2025-05-08 | End: 2025-06-07

## 2025-06-05 ENCOUNTER — OFFICE VISIT (OUTPATIENT)
Dept: FAMILY MEDICINE CLINIC | Age: 75
End: 2025-06-05
Payer: MEDICARE

## 2025-06-05 VITALS
OXYGEN SATURATION: 97 % | RESPIRATION RATE: 16 BRPM | BODY MASS INDEX: 27.05 KG/M2 | WEIGHT: 147 LBS | SYSTOLIC BLOOD PRESSURE: 118 MMHG | HEIGHT: 62 IN | HEART RATE: 102 BPM | DIASTOLIC BLOOD PRESSURE: 62 MMHG

## 2025-06-05 DIAGNOSIS — T40.2X5A THERAPEUTIC OPIOID-INDUCED CONSTIPATION (OIC): ICD-10-CM

## 2025-06-05 DIAGNOSIS — I48.0 PAROXYSMAL ATRIAL FIBRILLATION (HCC): ICD-10-CM

## 2025-06-05 DIAGNOSIS — M54.50 CHRONIC BILATERAL LOW BACK PAIN WITHOUT SCIATICA: Primary | ICD-10-CM

## 2025-06-05 DIAGNOSIS — R73.03 PREDIABETES: ICD-10-CM

## 2025-06-05 DIAGNOSIS — D72.829 LEUKOCYTOSIS, UNSPECIFIED TYPE: ICD-10-CM

## 2025-06-05 DIAGNOSIS — K59.03 THERAPEUTIC OPIOID-INDUCED CONSTIPATION (OIC): ICD-10-CM

## 2025-06-05 DIAGNOSIS — G89.4 CHRONIC PAIN SYNDROME: Chronic | ICD-10-CM

## 2025-06-05 DIAGNOSIS — G89.29 CHRONIC BILATERAL LOW BACK PAIN WITHOUT SCIATICA: Primary | ICD-10-CM

## 2025-06-05 PROCEDURE — 80305 DRUG TEST PRSMV DIR OPT OBS: CPT | Performed by: FAMILY MEDICINE

## 2025-06-05 PROCEDURE — 99214 OFFICE O/P EST MOD 30 MIN: CPT | Performed by: FAMILY MEDICINE

## 2025-06-05 PROCEDURE — 3074F SYST BP LT 130 MM HG: CPT | Performed by: FAMILY MEDICINE

## 2025-06-05 PROCEDURE — 1159F MED LIST DOCD IN RCRD: CPT | Performed by: FAMILY MEDICINE

## 2025-06-05 PROCEDURE — 3078F DIAST BP <80 MM HG: CPT | Performed by: FAMILY MEDICINE

## 2025-06-05 PROCEDURE — 1123F ACP DISCUSS/DSCN MKR DOCD: CPT | Performed by: FAMILY MEDICINE

## 2025-06-05 PROCEDURE — G2211 COMPLEX E/M VISIT ADD ON: HCPCS | Performed by: FAMILY MEDICINE

## 2025-06-05 NOTE — PROGRESS NOTES
fracture after fall  - on diclofenac PRN    Opioid induced constipation  - previously on Linzess, stopped due to diarrhea  - uses bowel softeners daily, laxatives PRN    CAD  - no CP  - sees Dr. Pino  - on statin and baby aspirin    Review of Systems            Objective     /62   Pulse (!) 102   Resp 16   Ht 1.575 m (5' 2\")   Wt 66.7 kg (147 lb)   SpO2 97%   BMI 26.89 kg/m²    Wt Readings from Last 3 Encounters:   06/05/25 66.7 kg (147 lb)   09/23/24 70.8 kg (156 lb)   09/19/24 71.7 kg (158 lb)     BP Readings from Last 3 Encounters:   06/05/25 118/62   09/23/24 124/68   09/19/24 132/88     Physical Exam           An electronic signature was used to authenticate this note.    --Cholo Gurrola MD

## 2025-06-07 DIAGNOSIS — M15.0 PRIMARY OSTEOARTHRITIS INVOLVING MULTIPLE JOINTS: Chronic | ICD-10-CM

## 2025-06-07 DIAGNOSIS — G89.4 CHRONIC PAIN SYNDROME: Chronic | ICD-10-CM

## 2025-06-09 RX ORDER — HYDROCODONE BITARTRATE AND ACETAMINOPHEN 7.5; 325 MG/1; MG/1
1 TABLET ORAL EVERY 8 HOURS PRN
Qty: 90 TABLET | Refills: 0 | Status: SHIPPED | OUTPATIENT
Start: 2025-06-09 | End: 2025-07-09

## 2025-06-24 DIAGNOSIS — G89.4 CHRONIC PAIN SYNDROME: Chronic | ICD-10-CM

## 2025-06-24 RX ORDER — KETOCONAZOLE 20 MG/G
CREAM TOPICAL
Qty: 30 G | Refills: 1 | Status: SHIPPED | OUTPATIENT
Start: 2025-06-24

## 2025-07-20 ENCOUNTER — PATIENT MESSAGE (OUTPATIENT)
Dept: FAMILY MEDICINE CLINIC | Age: 75
End: 2025-07-20

## 2025-07-20 DIAGNOSIS — M15.0 PRIMARY OSTEOARTHRITIS INVOLVING MULTIPLE JOINTS: Chronic | ICD-10-CM

## 2025-07-20 DIAGNOSIS — G89.4 CHRONIC PAIN SYNDROME: Chronic | ICD-10-CM

## 2025-07-21 RX ORDER — HYDROCODONE BITARTRATE AND ACETAMINOPHEN 7.5; 325 MG/1; MG/1
1 TABLET ORAL EVERY 8 HOURS PRN
Qty: 90 TABLET | Refills: 0 | Status: SHIPPED | OUTPATIENT
Start: 2025-07-21 | End: 2025-08-20

## 2025-07-27 DIAGNOSIS — E78.2 MIXED HYPERLIPIDEMIA: ICD-10-CM

## 2025-07-28 RX ORDER — ATORVASTATIN CALCIUM 40 MG/1
40 TABLET, FILM COATED ORAL NIGHTLY
Qty: 90 TABLET | Refills: 1 | Status: SHIPPED | OUTPATIENT
Start: 2025-07-28

## 2025-08-17 DIAGNOSIS — M15.0 PRIMARY OSTEOARTHRITIS INVOLVING MULTIPLE JOINTS: Chronic | ICD-10-CM

## 2025-08-17 DIAGNOSIS — G89.4 CHRONIC PAIN SYNDROME: Chronic | ICD-10-CM

## 2025-08-18 RX ORDER — HYDROCODONE BITARTRATE AND ACETAMINOPHEN 7.5; 325 MG/1; MG/1
1 TABLET ORAL EVERY 8 HOURS PRN
Qty: 90 TABLET | Refills: 0 | Status: SHIPPED | OUTPATIENT
Start: 2025-08-18 | End: 2025-09-17

## (undated) DEVICE — DRESSING ALG W4XL8IN AG FOAM SUPERABSORBENT SIL ANTIMIC

## (undated) DEVICE — GLOVE ORTHO 8   MSG9480

## (undated) DEVICE — BASIC SINGLE BASIN 1-LF: Brand: MEDLINE INDUSTRIES, INC.

## (undated) DEVICE — GLOVE SURG SZ 8 L12IN FNGR THK79MIL GRN LTX FREE

## (undated) DEVICE — HYPODERMIC SAFETY NEEDLE: Brand: MAGELLAN

## (undated) DEVICE — SUTURE VCRL + SZ 0 L18IN ABSRB UD L36MM CT-1 1/2 CIR VCP840D

## (undated) DEVICE — SOLUTION IV IRRIG POUR BRL 0.9% SODIUM CHL 2F7124

## (undated) DEVICE — GLOVE,SURG,SENSICARE SLT,LF,PF,8.5: Brand: MEDLINE

## (undated) DEVICE — C-ARM PACK: Brand: C-ARM COVER

## (undated) DEVICE — GLOVE,SURG,SENSICARE SLT,LF,PF,8: Brand: MEDLINE

## (undated) DEVICE — 3M™ COBAN™ NL STERILE NON-LATEX SELF-ADHERENT WRAP, 2084S, 4 IN X 5 YD (10 CM X 4,5 M), 18 ROLLS/CASE: Brand: 3M™ COBAN™

## (undated) DEVICE — GUIDEWIRE ORTH L400MM DIA3.2MM FOR TFN

## (undated) DEVICE — BIT DRL L330MM DIA4.2MM CALIB 100MM 3 FLUT QUIK CPL

## (undated) DEVICE — SUTURE VCRL + SZ 2-0 L18IN ABSRB UD CT1 L36MM 1/2 CIR VCP839D

## (undated) DEVICE — HIP PINNING: Brand: MEDLINE INDUSTRIES, INC.

## (undated) DEVICE — ROD RMR L950MM DIA3MM W/ STR BALL TIP

## (undated) DEVICE — BIT DRL L500MM DIA6X9MM CANN STP L QUIK CPL FOR DH DC TFN